# Patient Record
Sex: FEMALE | Race: WHITE | NOT HISPANIC OR LATINO | Employment: UNEMPLOYED | URBAN - METROPOLITAN AREA
[De-identification: names, ages, dates, MRNs, and addresses within clinical notes are randomized per-mention and may not be internally consistent; named-entity substitution may affect disease eponyms.]

---

## 2017-03-08 RX ORDER — DIAZEPAM 2 MG/1
TABLET ORAL
Qty: 4 TABLET | Refills: 0 | Status: SHIPPED | OUTPATIENT
Start: 2017-03-08 | End: 2017-03-08 | Stop reason: SDUPTHER

## 2017-03-08 RX ORDER — DIAZEPAM 2 MG/1
TABLET ORAL
Qty: 4 TABLET | Refills: 0 | Status: SHIPPED | OUTPATIENT
Start: 2017-03-08 | End: 2017-04-13

## 2017-04-13 ENCOUNTER — OFFICE VISIT (OUTPATIENT)
Dept: PSYCHIATRY | Facility: CLINIC | Age: 52
End: 2017-04-13
Payer: COMMERCIAL

## 2017-04-13 VITALS
DIASTOLIC BLOOD PRESSURE: 72 MMHG | HEIGHT: 67 IN | HEART RATE: 73 BPM | WEIGHT: 249.88 LBS | SYSTOLIC BLOOD PRESSURE: 119 MMHG | BODY MASS INDEX: 39.22 KG/M2 | RESPIRATION RATE: 16 BRPM

## 2017-04-13 DIAGNOSIS — F32.89 OTHER DEPRESSION: Primary | ICD-10-CM

## 2017-04-13 PROCEDURE — 1160F RVW MEDS BY RX/DR IN RCRD: CPT | Mod: S$GLB,,, | Performed by: PSYCHIATRY & NEUROLOGY

## 2017-04-13 PROCEDURE — 99999 PR PBB SHADOW E&M-EST. PATIENT-LVL III: CPT | Mod: PBBFAC,,, | Performed by: PSYCHIATRY & NEUROLOGY

## 2017-04-13 PROCEDURE — 99214 OFFICE O/P EST MOD 30 MIN: CPT | Mod: S$GLB,,, | Performed by: PSYCHIATRY & NEUROLOGY

## 2017-04-13 RX ORDER — BUPROPION HYDROCHLORIDE 150 MG/1
150 TABLET, EXTENDED RELEASE ORAL DAILY
Qty: 180 TABLET | Refills: 4
Start: 2017-04-13 | End: 2017-06-26 | Stop reason: SDUPTHER

## 2017-04-13 RX ORDER — LAMOTRIGINE 100 MG/1
100 TABLET ORAL DAILY
Qty: 90 TABLET | Refills: 0 | Status: SHIPPED | OUTPATIENT
Start: 2017-04-13 | End: 2017-04-13 | Stop reason: SDUPTHER

## 2017-04-13 RX ORDER — LAMOTRIGINE 100 MG/1
100 TABLET ORAL DAILY
Qty: 90 TABLET | Refills: 0 | Status: SHIPPED | OUTPATIENT
Start: 2017-04-13 | End: 2017-06-26 | Stop reason: SDUPTHER

## 2017-06-26 RX ORDER — BUPROPION HYDROCHLORIDE 150 MG/1
150 TABLET, EXTENDED RELEASE ORAL DAILY
Qty: 180 TABLET | Refills: 4
Start: 2017-06-26 | End: 2017-07-06 | Stop reason: SDUPTHER

## 2017-06-26 RX ORDER — LAMOTRIGINE 100 MG/1
100 TABLET ORAL DAILY
Qty: 90 TABLET | Refills: 1 | Status: SHIPPED | OUTPATIENT
Start: 2017-06-26 | End: 2018-01-19 | Stop reason: SDUPTHER

## 2017-07-06 NOTE — TELEPHONE ENCOUNTER
Patient states her refill of Wellbutrin  mg has not come in from [facility]. She is asking for a #30-day supply to be sent to her local [facility].

## 2017-07-07 RX ORDER — BUPROPION HYDROCHLORIDE 150 MG/1
150 TABLET, EXTENDED RELEASE ORAL DAILY
Qty: 30 TABLET | Refills: 0
Start: 2017-07-07 | End: 2017-07-11 | Stop reason: SDUPTHER

## 2017-07-12 RX ORDER — BUPROPION HYDROCHLORIDE 150 MG/1
150 TABLET, EXTENDED RELEASE ORAL DAILY
Qty: 180 TABLET | Refills: 1 | Status: SHIPPED | OUTPATIENT
Start: 2017-07-12 | End: 2017-07-31 | Stop reason: SDUPTHER

## 2017-07-31 RX ORDER — BUPROPION HYDROCHLORIDE 150 MG/1
150 TABLET, EXTENDED RELEASE ORAL DAILY
Qty: 180 TABLET | Refills: 0 | Status: SHIPPED | OUTPATIENT
Start: 2017-07-31 | End: 2018-01-19 | Stop reason: SDUPTHER

## 2017-07-31 NOTE — TELEPHONE ENCOUNTER
[facility] states they never received the script for Wellbutrin  mg. Patient is requesting her refill be sent to her local [facility].

## 2017-11-30 RX ORDER — DULOXETIN HYDROCHLORIDE 60 MG/1
60 CAPSULE, DELAYED RELEASE ORAL DAILY
Qty: 90 CAPSULE | Refills: 0 | Status: SHIPPED | OUTPATIENT
Start: 2017-11-30 | End: 2018-01-19 | Stop reason: SDUPTHER

## 2018-01-19 RX ORDER — LAMOTRIGINE 100 MG/1
100 TABLET ORAL DAILY
Qty: 10 TABLET | Refills: 0 | Status: SHIPPED | OUTPATIENT
Start: 2018-01-19 | End: 2018-01-23 | Stop reason: SDUPTHER

## 2018-01-19 RX ORDER — DULOXETIN HYDROCHLORIDE 60 MG/1
60 CAPSULE, DELAYED RELEASE ORAL DAILY
Qty: 10 CAPSULE | Refills: 0 | Status: SHIPPED | OUTPATIENT
Start: 2018-01-19 | End: 2018-01-23 | Stop reason: SDUPTHER

## 2018-01-19 RX ORDER — BUPROPION HYDROCHLORIDE 150 MG/1
150 TABLET, EXTENDED RELEASE ORAL DAILY
Qty: 10 TABLET | Refills: 0 | Status: SHIPPED | OUTPATIENT
Start: 2018-01-19 | End: 2018-01-23 | Stop reason: SDUPTHER

## 2018-01-19 NOTE — TELEPHONE ENCOUNTER
Dr Og's patient.   She was supposed to come in Wednesday, but had to reschedule due to weather. Patient is scheduled 2/21.

## 2018-01-27 RX ORDER — LAMOTRIGINE 100 MG/1
100 TABLET ORAL DAILY
Qty: 30 TABLET | Refills: 1 | Status: SHIPPED | OUTPATIENT
Start: 2018-01-27 | End: 2018-02-21 | Stop reason: SDUPTHER

## 2018-01-27 RX ORDER — DULOXETIN HYDROCHLORIDE 60 MG/1
60 CAPSULE, DELAYED RELEASE ORAL DAILY
Qty: 30 CAPSULE | Refills: 1 | Status: SHIPPED | OUTPATIENT
Start: 2018-01-27 | End: 2018-02-21 | Stop reason: SDUPTHER

## 2018-01-27 RX ORDER — BUPROPION HYDROCHLORIDE 150 MG/1
150 TABLET, EXTENDED RELEASE ORAL DAILY
Qty: 30 TABLET | Refills: 1 | Status: SHIPPED | OUTPATIENT
Start: 2018-01-27 | End: 2018-02-21 | Stop reason: SDUPTHER

## 2018-02-21 ENCOUNTER — OFFICE VISIT (OUTPATIENT)
Dept: PSYCHIATRY | Facility: CLINIC | Age: 53
End: 2018-02-21
Payer: COMMERCIAL

## 2018-02-21 VITALS
DIASTOLIC BLOOD PRESSURE: 78 MMHG | RESPIRATION RATE: 18 BRPM | HEART RATE: 74 BPM | WEIGHT: 242.06 LBS | SYSTOLIC BLOOD PRESSURE: 132 MMHG | BODY MASS INDEX: 37.99 KG/M2 | HEIGHT: 67 IN

## 2018-02-21 DIAGNOSIS — F32.89 OTHER DEPRESSION: Primary | ICD-10-CM

## 2018-02-21 DIAGNOSIS — E66.9 CLASS 2 OBESITY WITHOUT SERIOUS COMORBIDITY WITH BODY MASS INDEX (BMI) OF 37.0 TO 37.9 IN ADULT, UNSPECIFIED OBESITY TYPE: ICD-10-CM

## 2018-02-21 PROCEDURE — 99214 OFFICE O/P EST MOD 30 MIN: CPT | Mod: S$GLB,,, | Performed by: PSYCHIATRY & NEUROLOGY

## 2018-02-21 PROCEDURE — 3008F BODY MASS INDEX DOCD: CPT | Mod: S$GLB,,, | Performed by: PSYCHIATRY & NEUROLOGY

## 2018-02-21 PROCEDURE — 99999 PR PBB SHADOW E&M-EST. PATIENT-LVL III: CPT | Mod: PBBFAC,,, | Performed by: PSYCHIATRY & NEUROLOGY

## 2018-02-21 RX ORDER — NALTREXONE HYDROCHLORIDE 50 MG/1
25 TABLET, FILM COATED ORAL DAILY
Qty: 45 TABLET | Refills: 0 | Status: SHIPPED | OUTPATIENT
Start: 2018-02-21 | End: 2018-03-23

## 2018-02-21 RX ORDER — DULOXETIN HYDROCHLORIDE 60 MG/1
60 CAPSULE, DELAYED RELEASE ORAL DAILY
Qty: 90 CAPSULE | Refills: 1 | Status: SHIPPED | OUTPATIENT
Start: 2018-02-21 | End: 2018-05-16 | Stop reason: SDUPTHER

## 2018-02-21 RX ORDER — BUPROPION HYDROCHLORIDE 150 MG/1
150 TABLET, EXTENDED RELEASE ORAL DAILY
Qty: 90 TABLET | Refills: 1 | Status: SHIPPED | OUTPATIENT
Start: 2018-02-21 | End: 2018-05-16 | Stop reason: SDUPTHER

## 2018-02-21 RX ORDER — LAMOTRIGINE 100 MG/1
100 TABLET ORAL DAILY
Qty: 90 TABLET | Refills: 1 | Status: SHIPPED | OUTPATIENT
Start: 2018-02-21 | End: 2018-05-31 | Stop reason: SDUPTHER

## 2018-02-21 NOTE — PROGRESS NOTES
"Outpatient Psychiatry Follow up(MD/NP)    2/21/2018    Tiffany Hendrix, a 52 y.o. female, presenting for initial evaluation visit. Met with patient.    Reason for Encounter: follow up*. Patient complains of   Chief Complaint   Patient presents with    Anxiety    Mood Disorder   .    History of Present Illness:     Patient was seen and examined. Her chart was reviewed.     She has been compliant with treatment. She denied ay side effects. She reports good efficac and tolerability    She had knee replacement surgery on her right knee- she rehabed and recovered well. Since then, she has been more active at home.     Family and marriage are stable and supportive.     She feels well and requests a decrease in wellbutrin to q day, "I have been feeling good and would like to not be on so much meds."     Denied Symptoms of Depression: no diminished mood or loss of interest/anhedonia; no irritability, diminished energy, change in sleep, change in appetite, diminished concentration or cognition or indecisiveness, PMA/R, excessive guilt or hopelessness or worthlessness, or suicidal ideations; no anxious features; no episodes that lasted more than 2 weeks or more at a time- episodes last a few days at a time    Denied issues with Sleep: no issues with initiation, maintenance, early morning awakening with inability to return to sleep, or hypersomnolence     Denied Suicidal/Homicidal ideations: no active/passive ideations, organized plans, or future intentions    Denied Symptoms of psychosis: no hallucinations, delusions, disorganized thinking, disorganized behavior or abnormal motor behavior, or negative symptoms     Denied past or current Symptoms of aron or hypomania: no elevated, expansive, or irritable mood with increased energy or activity; with no inflated self-esteem or grandiosity, decreased need for sleep, increased rate of speech, FOI or racing thoughts, distractibility, increased goal directed activity or " "PMA, or risky/disinhibited behavior    She is very concerned about her weight loss- current 242, last was 249; she would like to try a weight loss medications- she feels that Contrave would be a good choice, but she would like to just add naltrexone to her current Wellbutrin dosage.     A comprehensive ROS was negative.    Spoke with her  who reports that she is doing well and corroborates the above and following history and plan.   .  Current Evaluation:     Nutritional Screening: Considering the patient's height and weight, medications, medical history and preferences, should a referral be made to the dietitian? no    Constitutional  Vitals:  Most recent vital signs, dated less than 90 days prior to this appointment, were reviewed.    Vitals:    02/21/18 1047   BP: 132/78   Pulse: 74   Resp: 18   Weight: 109.8 kg (242 lb 1 oz)   Height: 5' 7" (1.702 m)     Body mass index is 37.91 kg/m².       General:  unremarkable, age appropriate     Musculoskeletal  Muscle Strength/Tone:  not examined, no dyskinesia, no dystonia, no tremor, no tic   Gait & Station:  non-ataxic     Psychiatric  Speech:  no latency; no press   Mood & Affect:  euthymic "good"  congruent and appropriate   Thought Process:  normal and logical   Associations:  intact   Thought Content:  normal, no suicidality, no homicidality, delusions, or paranoia   Insight:  intact   Judgement: behavior is adequate to circumstances   Orientation:  grossly intact   Memory: intact for content of interview   Language: grossly intact, able to name, able to repeat   Attention Span & Concentration:  able to focus   Fund of Knowledge:  intact and appropriate to age and level of education       Relevant Elements of Neurological Exam: normal gait    Laboratory Data  No visits with results within 1 Month(s) from this visit.   Latest known visit with results is:   Lab Visit on 02/26/2013   Component Date Value Ref Range Status    TSH 02/26/2013 0.071* 0.4 - 4.0 " uIU/ml Final    Free T4 02/26/2013 0.74  0.71 - 1.51 ng/dl Final    T3, Free 02/26/2013 4.6* 2.3 - 4.2 pg/mL Final    T3, Total 02/26/2013 217.83* 60 - 180 ng/dl Final         Medications  Outpatient Encounter Prescriptions as of 2/21/2018   Medication Sig Dispense Refill    aspirin (ECOTRIN) 81 MG EC tablet Take 81 mg by mouth once daily.      buPROPion (WELLBUTRIN SR) 150 MG TBSR 12 hr tablet Take 1 tablet (150 mg total) by mouth once daily. 30 tablet 1    calcium carbonate-vitamin D3 (CALCIUM 600 + D,3,) 600 mg calcium- 200 unit Cap Take by mouth.      DULoxetine (CYMBALTA) 60 MG capsule Take 1 capsule (60 mg total) by mouth once daily. 30 capsule 1    ESTRADIOL (VIVELLE-DOT TD) Place onto the skin.      lamoTRIgine (LAMICTAL) 100 MG tablet Take 1 tablet (100 mg total) by mouth once daily. 30 tablet 1    levothyroxine 125 mcg Cap TAKE 1 TABLET ONCE A DAY ORALLY 90 DAYS  1    metoprolol succinate (TOPROL-XL) 25 MG 24 hr tablet Take 50 mg by mouth once daily.       MULTIVIT &MINERALS/FERROUS FUM (MULTI VITAMIN ORAL) Take by mouth.      pantoprazole (PROTONIX) 40 MG tablet Take 40 mg by mouth once daily.      rosuvastatin (CRESTOR) 10 MG tablet Take 20 mg by mouth every evening.        No facility-administered encounter medications on file as of 2/21/2018.            Assessment - Diagnosis - Goals:     Unspecified Depressive Disorder  Unspecified Anxiety Disorder    Morbid obesity     Treatment Plan/Recommendations:       Medications:  Continue Bupropion  mg po q DAILY for depression  Continue Cymbalta to 60 mg po q AM for depression/chronic pain  Continue Lamictal 100 mg po q day for adjunctive depression/chronic pain  Trial of naltrexone 25 mg po q day for weight loss/obesity    Discussed diagnosis, risks and benefits of proposed treatment vs alternative treatments vs no treatment, and potential side effects of these treatments.  The patient expresses understanding of the above and displays the  capacity to agree with this treatment given said understanding.  Patient also agrees that, currently, the benefits outweigh the risks and would like to pursue treatment at this time.    We discussed risk of relapse with lower dosages- she was counseled on s/s of relapse. She and her  will be on guard fo rthem and resume previous dosages and call the clinic if symptoms return.     Therapy:   Not indicated at this time; will refer if/when needed    Counseling:  Counseled on diet and exercise for weight loss/obesity    Return to Clinic: 3 months, sooner if needed    Jey Og MD  Psychiatry

## 2018-03-10 ENCOUNTER — HOSPITAL ENCOUNTER (OUTPATIENT)
Facility: HOSPITAL | Age: 53
Discharge: HOME | End: 2018-03-12
Attending: SURGERY | Admitting: SURGERY
Payer: COMMERCIAL

## 2018-03-10 DIAGNOSIS — A41.9 SEPSIS, DUE TO UNSPECIFIED ORGANISM: Primary | ICD-10-CM

## 2018-03-10 DIAGNOSIS — R10.9 FLANK PAIN: ICD-10-CM

## 2018-03-10 DIAGNOSIS — K62.89 PROCTITIS: ICD-10-CM

## 2018-03-10 LAB
ALBUMIN SERPL BCP-MCNC: 4.3 G/DL
ALP SERPL-CCNC: 90 U/L
ALT SERPL W/O P-5'-P-CCNC: 20 U/L
ANION GAP SERPL CALC-SCNC: 15 MMOL/L
APTT BLDCRRT: 25.1 SEC
AST SERPL-CCNC: 16 U/L
BASOPHILS # BLD AUTO: 0.02 K/UL
BASOPHILS NFR BLD: 0.1 %
BILIRUB SERPL-MCNC: 0.5 MG/DL
BILIRUB UR QL STRIP: NEGATIVE
BNP SERPL-MCNC: <10 PG/ML
BUN SERPL-MCNC: 10 MG/DL
CALCIUM SERPL-MCNC: 10.1 MG/DL
CHLORIDE SERPL-SCNC: 103 MMOL/L
CK MB SERPL-MCNC: 1.5 NG/ML
CK MB SERPL-RTO: 1.8 %
CK SERPL-CCNC: 84 U/L
CK SERPL-CCNC: 84 U/L
CLARITY UR: CLEAR
CO2 SERPL-SCNC: 21 MMOL/L
COLOR UR: YELLOW
CREAT SERPL-MCNC: 0.9 MG/DL
DIFFERENTIAL METHOD: ABNORMAL
EOSINOPHIL # BLD AUTO: 0.2 K/UL
EOSINOPHIL NFR BLD: 1.4 %
ERYTHROCYTE [DISTWIDTH] IN BLOOD BY AUTOMATED COUNT: 15 %
EST. GFR  (AFRICAN AMERICAN): >60 ML/MIN/1.73 M^2
EST. GFR  (NON AFRICAN AMERICAN): >60 ML/MIN/1.73 M^2
GLUCOSE SERPL-MCNC: 87 MG/DL
GLUCOSE UR QL STRIP: NEGATIVE
HCT VFR BLD AUTO: 44.4 %
HGB BLD-MCNC: 14.7 G/DL
HGB UR QL STRIP: ABNORMAL
INR PPP: 1
KETONES UR QL STRIP: NEGATIVE
LACTATE SERPL-SCNC: 0.8 MMOL/L
LACTATE SERPL-SCNC: 3 MMOL/L
LEUKOCYTE ESTERASE UR QL STRIP: NEGATIVE
LYMPHOCYTES # BLD AUTO: 3.9 K/UL
LYMPHOCYTES NFR BLD: 28.6 %
MAGNESIUM SERPL-MCNC: 2.6 MG/DL
MCH RBC QN AUTO: 28.9 PG
MCHC RBC AUTO-ENTMCNC: 33.1 G/DL
MCV RBC AUTO: 87 FL
MONOCYTES # BLD AUTO: 1.1 K/UL
MONOCYTES NFR BLD: 8.4 %
NEUTROPHILS # BLD AUTO: 8.3 K/UL
NEUTROPHILS NFR BLD: 61.5 %
NITRITE UR QL STRIP: NEGATIVE
PH UR STRIP: 6 [PH] (ref 5–8)
PHOSPHATE SERPL-MCNC: 1.3 MG/DL
PLATELET # BLD AUTO: 247 K/UL
PMV BLD AUTO: 11.6 FL
POTASSIUM SERPL-SCNC: 4.1 MMOL/L
PROT SERPL-MCNC: 7.9 G/DL
PROT UR QL STRIP: ABNORMAL
PROTHROMBIN TIME: 10.1 SEC
RBC # BLD AUTO: 5.08 M/UL
SODIUM SERPL-SCNC: 139 MMOL/L
SP GR UR STRIP: 1.01 (ref 1–1.03)
TROPONIN I SERPL DL<=0.01 NG/ML-MCNC: <0.006 NG/ML
URN SPEC COLLECT METH UR: ABNORMAL
UROBILINOGEN UR STRIP-ACNC: NEGATIVE EU/DL
WBC # BLD AUTO: 13.57 K/UL

## 2018-03-10 PROCEDURE — 63600175 PHARM REV CODE 636 W HCPCS: Performed by: SURGERY

## 2018-03-10 PROCEDURE — 25000003 PHARM REV CODE 250: Performed by: SURGERY

## 2018-03-10 PROCEDURE — 94760 N-INVAS EAR/PLS OXIMETRY 1: CPT

## 2018-03-10 PROCEDURE — 85610 PROTHROMBIN TIME: CPT

## 2018-03-10 PROCEDURE — 96361 HYDRATE IV INFUSION ADD-ON: CPT

## 2018-03-10 PROCEDURE — 83605 ASSAY OF LACTIC ACID: CPT | Mod: 91

## 2018-03-10 PROCEDURE — 83735 ASSAY OF MAGNESIUM: CPT

## 2018-03-10 PROCEDURE — 96365 THER/PROPH/DIAG IV INF INIT: CPT

## 2018-03-10 PROCEDURE — 82553 CREATINE MB FRACTION: CPT

## 2018-03-10 PROCEDURE — 96367 TX/PROPH/DG ADDL SEQ IV INF: CPT

## 2018-03-10 PROCEDURE — G0378 HOSPITAL OBSERVATION PER HR: HCPCS

## 2018-03-10 PROCEDURE — 96375 TX/PRO/DX INJ NEW DRUG ADDON: CPT

## 2018-03-10 PROCEDURE — 87040 BLOOD CULTURE FOR BACTERIA: CPT

## 2018-03-10 PROCEDURE — 93010 ELECTROCARDIOGRAM REPORT: CPT | Mod: ,,, | Performed by: INTERNAL MEDICINE

## 2018-03-10 PROCEDURE — 80053 COMPREHEN METABOLIC PANEL: CPT

## 2018-03-10 PROCEDURE — 84484 ASSAY OF TROPONIN QUANT: CPT

## 2018-03-10 PROCEDURE — 93005 ELECTROCARDIOGRAM TRACING: CPT

## 2018-03-10 PROCEDURE — S0030 INJECTION, METRONIDAZOLE: HCPCS | Performed by: SURGERY

## 2018-03-10 PROCEDURE — 36415 COLL VENOUS BLD VENIPUNCTURE: CPT

## 2018-03-10 PROCEDURE — 85025 COMPLETE CBC W/AUTO DIFF WBC: CPT

## 2018-03-10 PROCEDURE — 99285 EMERGENCY DEPT VISIT HI MDM: CPT | Mod: 25

## 2018-03-10 PROCEDURE — 81003 URINALYSIS AUTO W/O SCOPE: CPT

## 2018-03-10 PROCEDURE — 96376 TX/PRO/DX INJ SAME DRUG ADON: CPT

## 2018-03-10 PROCEDURE — 85730 THROMBOPLASTIN TIME PARTIAL: CPT

## 2018-03-10 PROCEDURE — 84100 ASSAY OF PHOSPHORUS: CPT

## 2018-03-10 PROCEDURE — 82550 ASSAY OF CK (CPK): CPT

## 2018-03-10 PROCEDURE — 83880 ASSAY OF NATRIURETIC PEPTIDE: CPT

## 2018-03-10 RX ORDER — BUPROPION HYDROCHLORIDE 150 MG/1
150 TABLET, EXTENDED RELEASE ORAL DAILY
Status: DISCONTINUED | OUTPATIENT
Start: 2018-03-11 | End: 2018-03-12 | Stop reason: HOSPADM

## 2018-03-10 RX ORDER — DULOXETIN HYDROCHLORIDE 30 MG/1
60 CAPSULE, DELAYED RELEASE ORAL DAILY
Status: DISCONTINUED | OUTPATIENT
Start: 2018-03-11 | End: 2018-03-12 | Stop reason: HOSPADM

## 2018-03-10 RX ORDER — SODIUM CHLORIDE 9 MG/ML
1000 INJECTION, SOLUTION INTRAVENOUS
Status: ACTIVE | OUTPATIENT
Start: 2018-03-10 | End: 2018-03-11

## 2018-03-10 RX ORDER — HYDROMORPHONE HYDROCHLORIDE 2 MG/ML
2 INJECTION, SOLUTION INTRAMUSCULAR; INTRAVENOUS; SUBCUTANEOUS EVERY 4 HOURS PRN
Status: DISCONTINUED | OUTPATIENT
Start: 2018-03-10 | End: 2018-03-12 | Stop reason: HOSPADM

## 2018-03-10 RX ORDER — ONDANSETRON 2 MG/ML
4 INJECTION INTRAMUSCULAR; INTRAVENOUS
Status: COMPLETED | OUTPATIENT
Start: 2018-03-10 | End: 2018-03-10

## 2018-03-10 RX ORDER — METOPROLOL SUCCINATE 50 MG/1
50 TABLET, EXTENDED RELEASE ORAL DAILY
Status: DISCONTINUED | OUTPATIENT
Start: 2018-03-11 | End: 2018-03-12 | Stop reason: HOSPADM

## 2018-03-10 RX ORDER — KETOROLAC TROMETHAMINE 30 MG/ML
30 INJECTION, SOLUTION INTRAMUSCULAR; INTRAVENOUS EVERY 6 HOURS PRN
Status: DISPENSED | OUTPATIENT
Start: 2018-03-10 | End: 2018-03-11

## 2018-03-10 RX ORDER — HYDROMORPHONE HYDROCHLORIDE 2 MG/ML
1 INJECTION, SOLUTION INTRAMUSCULAR; INTRAVENOUS; SUBCUTANEOUS
Status: COMPLETED | OUTPATIENT
Start: 2018-03-10 | End: 2018-03-10

## 2018-03-10 RX ORDER — KETOROLAC TROMETHAMINE 30 MG/ML
30 INJECTION, SOLUTION INTRAMUSCULAR; INTRAVENOUS
Status: COMPLETED | OUTPATIENT
Start: 2018-03-10 | End: 2018-03-10

## 2018-03-10 RX ORDER — PANTOPRAZOLE SODIUM 40 MG/1
40 TABLET, DELAYED RELEASE ORAL DAILY
Status: DISCONTINUED | OUTPATIENT
Start: 2018-03-11 | End: 2018-03-12 | Stop reason: HOSPADM

## 2018-03-10 RX ORDER — SODIUM CHLORIDE 9 MG/ML
1000 INJECTION, SOLUTION INTRAVENOUS
Status: COMPLETED | OUTPATIENT
Start: 2018-03-10 | End: 2018-03-10

## 2018-03-10 RX ORDER — ASPIRIN 81 MG/1
81 TABLET ORAL DAILY
Status: DISCONTINUED | OUTPATIENT
Start: 2018-03-11 | End: 2018-03-12 | Stop reason: HOSPADM

## 2018-03-10 RX ORDER — ONDANSETRON 2 MG/ML
4 INJECTION INTRAMUSCULAR; INTRAVENOUS EVERY 8 HOURS PRN
Status: DISCONTINUED | OUTPATIENT
Start: 2018-03-10 | End: 2018-03-12 | Stop reason: HOSPADM

## 2018-03-10 RX ORDER — HYDROMORPHONE HYDROCHLORIDE 1 MG/ML
1 INJECTION, SOLUTION INTRAMUSCULAR; INTRAVENOUS; SUBCUTANEOUS EVERY 4 HOURS PRN
Status: DISCONTINUED | OUTPATIENT
Start: 2018-03-10 | End: 2018-03-11 | Stop reason: DRUGHIGH

## 2018-03-10 RX ORDER — CIPROFLOXACIN 2 MG/ML
400 INJECTION, SOLUTION INTRAVENOUS
Status: DISCONTINUED | OUTPATIENT
Start: 2018-03-10 | End: 2018-03-12

## 2018-03-10 RX ORDER — SODIUM CHLORIDE 9 MG/ML
INJECTION, SOLUTION INTRAVENOUS CONTINUOUS
Status: DISCONTINUED | OUTPATIENT
Start: 2018-03-10 | End: 2018-03-12

## 2018-03-10 RX ORDER — HYDROMORPHONE HYDROCHLORIDE 2 MG/ML
2 INJECTION, SOLUTION INTRAMUSCULAR; INTRAVENOUS; SUBCUTANEOUS
Status: COMPLETED | OUTPATIENT
Start: 2018-03-10 | End: 2018-03-10

## 2018-03-10 RX ORDER — METRONIDAZOLE 500 MG/100ML
500 INJECTION, SOLUTION INTRAVENOUS
Status: DISCONTINUED | OUTPATIENT
Start: 2018-03-10 | End: 2018-03-12

## 2018-03-10 RX ORDER — LAMOTRIGINE 100 MG/1
100 TABLET ORAL DAILY
Status: DISCONTINUED | OUTPATIENT
Start: 2018-03-11 | End: 2018-03-12 | Stop reason: HOSPADM

## 2018-03-10 RX ADMIN — ONDANSETRON 4 MG: 2 INJECTION INTRAMUSCULAR; INTRAVENOUS at 05:03

## 2018-03-10 RX ADMIN — ONDANSETRON 4 MG: 2 INJECTION INTRAMUSCULAR; INTRAVENOUS at 11:03

## 2018-03-10 RX ADMIN — SODIUM CHLORIDE: 0.9 INJECTION, SOLUTION INTRAVENOUS at 09:03

## 2018-03-10 RX ADMIN — HYDROMORPHONE HYDROCHLORIDE 2 MG: 2 INJECTION INTRAMUSCULAR; INTRAVENOUS; SUBCUTANEOUS at 06:03

## 2018-03-10 RX ADMIN — HYDROMORPHONE HYDROCHLORIDE 2 MG: 2 INJECTION, SOLUTION INTRAMUSCULAR; INTRAVENOUS; SUBCUTANEOUS at 11:03

## 2018-03-10 RX ADMIN — KETOROLAC TROMETHAMINE 30 MG: 30 INJECTION, SOLUTION INTRAMUSCULAR at 11:03

## 2018-03-10 RX ADMIN — SODIUM CHLORIDE 1000 ML: 0.9 INJECTION, SOLUTION INTRAVENOUS at 05:03

## 2018-03-10 RX ADMIN — METRONIDAZOLE 500 MG: 500 INJECTION, SOLUTION INTRAVENOUS at 08:03

## 2018-03-10 RX ADMIN — CIPROFLOXACIN 400 MG: 2 INJECTION, SOLUTION INTRAVENOUS at 07:03

## 2018-03-10 RX ADMIN — HYDROMORPHONE HYDROCHLORIDE 1 MG: 2 INJECTION INTRAMUSCULAR; INTRAVENOUS; SUBCUTANEOUS at 07:03

## 2018-03-10 RX ADMIN — KETOROLAC TROMETHAMINE 30 MG: 30 INJECTION, SOLUTION INTRAMUSCULAR at 06:03

## 2018-03-10 RX ADMIN — HYDROMORPHONE HYDROCHLORIDE 1 MG: 2 INJECTION INTRAMUSCULAR; INTRAVENOUS; SUBCUTANEOUS at 05:03

## 2018-03-11 LAB
ALBUMIN SERPL BCP-MCNC: 3.5 G/DL
ALP SERPL-CCNC: 71 U/L
ALT SERPL W/O P-5'-P-CCNC: 17 U/L
ANION GAP SERPL CALC-SCNC: 10 MMOL/L
AST SERPL-CCNC: 15 U/L
BASOPHILS # BLD AUTO: 0.02 K/UL
BASOPHILS NFR BLD: 0.2 %
BILIRUB SERPL-MCNC: 0.5 MG/DL
BUN SERPL-MCNC: 11 MG/DL
CALCIUM SERPL-MCNC: 8.7 MG/DL
CHLORIDE SERPL-SCNC: 109 MMOL/L
CO2 SERPL-SCNC: 21 MMOL/L
CREAT SERPL-MCNC: 0.8 MG/DL
DIFFERENTIAL METHOD: ABNORMAL
EOSINOPHIL # BLD AUTO: 0.3 K/UL
EOSINOPHIL NFR BLD: 1.9 %
ERYTHROCYTE [DISTWIDTH] IN BLOOD BY AUTOMATED COUNT: 14.9 %
EST. GFR  (AFRICAN AMERICAN): >60 ML/MIN/1.73 M^2
EST. GFR  (NON AFRICAN AMERICAN): >60 ML/MIN/1.73 M^2
GLUCOSE SERPL-MCNC: 104 MG/DL
HCT VFR BLD AUTO: 38.6 %
HGB BLD-MCNC: 12.7 G/DL
LACTATE SERPL-SCNC: 1.1 MMOL/L
LYMPHOCYTES # BLD AUTO: 4.9 K/UL
LYMPHOCYTES NFR BLD: 38 %
MCH RBC QN AUTO: 29.4 PG
MCHC RBC AUTO-ENTMCNC: 32.9 G/DL
MCV RBC AUTO: 89 FL
MONOCYTES # BLD AUTO: 0.9 K/UL
MONOCYTES NFR BLD: 7.2 %
NEUTROPHILS # BLD AUTO: 6.9 K/UL
NEUTROPHILS NFR BLD: 52.7 %
PLATELET # BLD AUTO: 193 K/UL
PMV BLD AUTO: 11.7 FL
POTASSIUM SERPL-SCNC: 3.9 MMOL/L
PROT SERPL-MCNC: 6.4 G/DL
RBC # BLD AUTO: 4.32 M/UL
SODIUM SERPL-SCNC: 140 MMOL/L
WBC # BLD AUTO: 12.99 K/UL

## 2018-03-11 PROCEDURE — 63600175 PHARM REV CODE 636 W HCPCS: Performed by: SURGERY

## 2018-03-11 PROCEDURE — 96361 HYDRATE IV INFUSION ADD-ON: CPT

## 2018-03-11 PROCEDURE — 96366 THER/PROPH/DIAG IV INF ADDON: CPT

## 2018-03-11 PROCEDURE — 80053 COMPREHEN METABOLIC PANEL: CPT

## 2018-03-11 PROCEDURE — 94761 N-INVAS EAR/PLS OXIMETRY MLT: CPT

## 2018-03-11 PROCEDURE — 83605 ASSAY OF LACTIC ACID: CPT

## 2018-03-11 PROCEDURE — G0378 HOSPITAL OBSERVATION PER HR: HCPCS

## 2018-03-11 PROCEDURE — 36415 COLL VENOUS BLD VENIPUNCTURE: CPT

## 2018-03-11 PROCEDURE — 96376 TX/PRO/DX INJ SAME DRUG ADON: CPT

## 2018-03-11 PROCEDURE — 25000003 PHARM REV CODE 250: Performed by: SURGERY

## 2018-03-11 PROCEDURE — S0030 INJECTION, METRONIDAZOLE: HCPCS | Performed by: SURGERY

## 2018-03-11 PROCEDURE — 85025 COMPLETE CBC W/AUTO DIFF WBC: CPT

## 2018-03-11 RX ORDER — ZOLPIDEM TARTRATE 5 MG/1
5 TABLET ORAL NIGHTLY PRN
Status: DISCONTINUED | OUTPATIENT
Start: 2018-03-11 | End: 2018-03-12 | Stop reason: HOSPADM

## 2018-03-11 RX ORDER — HYDROCODONE BITARTRATE AND ACETAMINOPHEN 7.5; 325 MG/1; MG/1
1 TABLET ORAL EVERY 6 HOURS PRN
Status: DISCONTINUED | OUTPATIENT
Start: 2018-03-11 | End: 2018-03-12 | Stop reason: HOSPADM

## 2018-03-11 RX ORDER — METHOCARBAMOL 750 MG/1
1500 TABLET, FILM COATED ORAL 3 TIMES DAILY
Status: DISCONTINUED | OUTPATIENT
Start: 2018-03-11 | End: 2018-03-12 | Stop reason: HOSPADM

## 2018-03-11 RX ORDER — DOCUSATE SODIUM 100 MG/1
100 CAPSULE, LIQUID FILLED ORAL 2 TIMES DAILY
Status: DISCONTINUED | OUTPATIENT
Start: 2018-03-11 | End: 2018-03-12 | Stop reason: HOSPADM

## 2018-03-11 RX ADMIN — HYDROMORPHONE HYDROCHLORIDE 2 MG: 2 INJECTION, SOLUTION INTRAMUSCULAR; INTRAVENOUS; SUBCUTANEOUS at 05:03

## 2018-03-11 RX ADMIN — HYDROMORPHONE HYDROCHLORIDE 2 MG: 2 INJECTION, SOLUTION INTRAMUSCULAR; INTRAVENOUS; SUBCUTANEOUS at 10:03

## 2018-03-11 RX ADMIN — PANTOPRAZOLE SODIUM 40 MG: 40 TABLET, DELAYED RELEASE ORAL at 09:03

## 2018-03-11 RX ADMIN — SODIUM CHLORIDE: 0.9 INJECTION, SOLUTION INTRAVENOUS at 05:03

## 2018-03-11 RX ADMIN — LAMOTRIGINE 100 MG: 100 TABLET ORAL at 08:03

## 2018-03-11 RX ADMIN — METRONIDAZOLE 500 MG: 500 INJECTION, SOLUTION INTRAVENOUS at 03:03

## 2018-03-11 RX ADMIN — METRONIDAZOLE 500 MG: 500 INJECTION, SOLUTION INTRAVENOUS at 01:03

## 2018-03-11 RX ADMIN — BUPROPION HYDROCHLORIDE 150 MG: 150 TABLET, EXTENDED RELEASE ORAL at 09:03

## 2018-03-11 RX ADMIN — HYDROMORPHONE HYDROCHLORIDE 2 MG: 2 INJECTION, SOLUTION INTRAMUSCULAR; INTRAVENOUS; SUBCUTANEOUS at 01:03

## 2018-03-11 RX ADMIN — METRONIDAZOLE 500 MG: 500 INJECTION, SOLUTION INTRAVENOUS at 06:03

## 2018-03-11 RX ADMIN — ASPIRIN 81 MG: 81 TABLET, COATED ORAL at 08:03

## 2018-03-11 RX ADMIN — HYDROCODONE BITARTRATE AND ACETAMINOPHEN 1 TABLET: 7.5; 325 TABLET ORAL at 06:03

## 2018-03-11 RX ADMIN — HYDROCODONE BITARTRATE AND ACETAMINOPHEN 1 TABLET: 7.5; 325 TABLET ORAL at 11:03

## 2018-03-11 RX ADMIN — KETOROLAC TROMETHAMINE 30 MG: 30 INJECTION, SOLUTION INTRAMUSCULAR at 05:03

## 2018-03-11 RX ADMIN — HYDROMORPHONE HYDROCHLORIDE 2 MG: 2 INJECTION, SOLUTION INTRAMUSCULAR; INTRAVENOUS; SUBCUTANEOUS at 08:03

## 2018-03-11 RX ADMIN — METOPROLOL SUCCINATE 50 MG: 50 TABLET, EXTENDED RELEASE ORAL at 08:03

## 2018-03-11 RX ADMIN — METHOCARBAMOL 1500 MG: 750 TABLET ORAL at 01:03

## 2018-03-11 RX ADMIN — DOCUSATE SODIUM 100 MG: 100 CAPSULE, LIQUID FILLED ORAL at 01:03

## 2018-03-11 RX ADMIN — SODIUM CHLORIDE: 0.9 INJECTION, SOLUTION INTRAVENOUS at 08:03

## 2018-03-11 RX ADMIN — HYDROCODONE BITARTRATE AND ACETAMINOPHEN 1 TABLET: 7.5; 325 TABLET ORAL at 09:03

## 2018-03-11 RX ADMIN — METHOCARBAMOL 1500 MG: 750 TABLET ORAL at 09:03

## 2018-03-11 RX ADMIN — CIPROFLOXACIN 400 MG: 2 INJECTION, SOLUTION INTRAVENOUS at 06:03

## 2018-03-11 RX ADMIN — CIPROFLOXACIN 400 MG: 2 INJECTION, SOLUTION INTRAVENOUS at 08:03

## 2018-03-11 RX ADMIN — DOCUSATE SODIUM 100 MG: 100 CAPSULE, LIQUID FILLED ORAL at 09:03

## 2018-03-11 RX ADMIN — DULOXETINE 60 MG: 30 CAPSULE, DELAYED RELEASE ORAL at 08:03

## 2018-03-11 RX ADMIN — HYDROMORPHONE HYDROCHLORIDE 2 MG: 2 INJECTION, SOLUTION INTRAMUSCULAR; INTRAVENOUS; SUBCUTANEOUS at 04:03

## 2018-03-11 RX ADMIN — METRONIDAZOLE 500 MG: 500 INJECTION, SOLUTION INTRAVENOUS at 08:03

## 2018-03-11 NOTE — PROGRESS NOTES
HISTORY OF PRESENT ILLNESS:  The patient still reports right flank pain.  She   denies any fever or chills.  She denies any nausea or vomiting.  She denies any   dysuria or hematuria.  She denies any loose stools or diarrhea.  She denies any   rectal bleeding.  She has not had a bowel movement since admission.  The patient   does state that the morphine and Toradol have helped, but she still gets the   pain after a few hours of injection.    PHYSICAL EXAMINATION:  VITAL SIGNS:  Her temperature is 98.2, her blood pressure 164/86, her pulse is   71.  She is lying in bed.  She does not appear in any acute distress.  She is   easily aroused.  ABDOMEN:  Obese.  It is soft with a benign exam.  She has no significant   tenderness in the flank or the back.  NEUROLOGICAL:  There are no gross deficits in the lower extremities.  She has   normal strength in bilateral lower extremities.  There are no sensory deficits.    LABORATORY DATA:  Sodium 140, potassium 3.9, chloride 109, CO2 21, BUN 11,   creatinine 0.8, glucose 104.  White count of 13, hemoglobin 12, hematocrit 38,   platelet count 193.    ASSESSMENT AND PLAN:  A 52-year-old female with right flank pain.  At this time,   etiology is unclear.  It does not appear to be GI in nature, doubtful if this   is proctitis as was dictated on CT.    PLAN:  We will continue current management with pain control and a muscle   relaxer.  GI will see the patient tomorrow to see if colonoscopy is warranted   there.  Diet will be advanced as tolerated.  If workup is negative, probably   discharge home with pain control and she can follow up for workup of possible   musculoskeletal or back pain.      BM/HN  dd: 03/11/2018 13:55:14 (CDT)  td: 03/11/2018 14:40:59 (CDT)  Doc ID   #[id number]  Job ID #[id number]    CC:

## 2018-03-11 NOTE — H&P
DATE OF ADMIT:  03/10/2018    HISTORY:  The patient is a 52-year-old obese female who presented to the   Emergency Department with right hip and flank pain.  The patient was seen last   week by Urology for hematuria.  She was diagnosed with urinary tract infection   and took a week of Cipro.  She began to have some right flank and hip pain,   which started Thursday.  She said she felt real constipated and could not have a   bowel movement.  She took multiple laxatives and eventually had a large bowel   movement, which was hard, followed by some soft and then liquid stools.  She   noticed some blood when wiping herself, but no blood in her stool.  She started   with some right hip and lower back pain later that night, which became worse   yesterday.  The pain this morning progressed prompting visit to the Emergency   Department.  She reports subjective fever and chills.  She denies nausea or   vomiting. She denies dysuria or any further hematuria.  She denies any loose   stools or bloody stools.  She says she has had multiple colonoscopies, which   showed benign polyps, which were removed.  Her most recent colonoscopy was a   year ago.  She has had no other significant GI history.    PAST MEDICAL HISTORY:  Significant for bipolar disorder, hypothyroidism,   multinodular goiter, obesity.    PAST SURGICAL HISTORY:  Back surgery, hysterectomy and knee surgery.    MEDICATIONS:  Reviewed.    ALLERGIES:  CONTRAST DYE, MORPHINE, TRAMADOL AND ADHESIVE TAPE.    PHYSICAL EXAMINATION:  VITAL SIGNS:  The patient's blood pressure is 132/70, pulse is 111, temperature   is 97.  GENERAL:  The patient is obese.  She is sitting up in bed.  She appears   uncomfortable.  She is in no acute respiratory distress.  ABDOMEN:  Soft.  There is no tenderness.  RECTAL:  Performed by the Emergency Room physician.  He reports significant pain   with the rectal exam.  No blood or masses palpated.  EXTREMITIES:  Without edema.  NEUROLOGIC:  There  is no neurological dysfunction.    LABORATORY DATA:  Her sodium is 139, potassium 4.1, chloride 103, CO2 21, BUN   10, creatinine 0.9, glucose is 87.  White count 14, hemoglobin 14, hematocrit   44, platelet count 244.  Phosphorus level 1.3.  Lactate 3.  CT scan of the   abdomen and pelvis was performed without contrast.  Scan shows some perirectal   inflammation, which could be concerning for proctitis.  There is no abscess or   free fluid identified.    ASSESSMENT AND PLAN:  A 52-year-old female with proctitis.  The patient will be   admitted for IV fluids and IV antibiotics.  We will repeat rectal exam tomorrow   to make sure she is not developing an abscess.  No indication for surgical   intervention.  If the patient's symptoms do not resolve, she may benefit from a   proctoscopic exam and GI consult.      BM/HN  dd: 03/10/2018 20:37:58 (CST)  td: 03/10/2018 22:11:51 (CST)  Doc ID   #[id number]  Job ID #[id number]    CC:

## 2018-03-11 NOTE — ED PROVIDER NOTES
[facility]                                     Chief Complaint  52 y.o. female with Back Pain    History of Present Illness  Tiffany Hendrix presents to the emergency room with right flank pain today  Pt states she's had on-again, off-again right flank pain since yesterday, worse today  Patient was seen last week by her urologist, Dr. Weathers, was put on Cipro for UTI then  Patient states she has no dysuria or hematuria at this time, but severe flank pain  Patient denies any recent right flank trauma, has had several lumbar surgeries noted  CT stone study shows no kidney stone, but rather perirectal inflammation/proctitis  Has no history of proctitis, no history of inflammatory bowel disease or GI pathology    The history is provided by the patient    Past Medical History   -- Bipolar 1 disorder    -- Hypothyroidism    -- Nontoxic multinodular goiter    -- Obesity      Past Surgical History   -- BACK SURGERY     -- HYSTERECTOMY     -- KNEE SURGERY        Review of patient's allergies   -- Neuromuscular blockers, steroidal    -- Iodinated contrast- oral and iv dye    -- Morphine    -- Tramadol    -- Adhesive    -- Corticosteroids (glucocorticoids)       Review of Systems and Physical Exam      Review of Systems  -- Constitution - no fever, denies fatigue, no weakness, no chills  -- Eyes - no tearing or redness, no visual disturbance  -- Ear, Nose - no tinnitus or earache, no nasal congestion or discharge  -- Mouth,Throat - no sore throat, no toothache, normal voice, normal swallowing  -- Respiratory - denies cough and congestion, no shortness of breath, no ADAME  -- Cardiovascular - denies chest pain, no palpitations, denies claudication  -- Gastrointestinal - denies abdominal pain, nausea, vomiting, or diarrhea  -- Genitourinary - right flank pain, no hematuria or frequency, no dysuria  -- Musculoskeletal - denies back pain, negative for myalgias and arthralgias   -- Neurological - no headache,  denies weakness or seizure; no LOC  -- Skin - denies pallor, rash, or changes in skin. no hives or welts noted    /87   Pulse (!) 111   Temp 96.1 °F (35.6 °C)   Resp 18   Wt 109.8 kg (242 lb)   BMI 37.90 kg/m²      Physical Exam  -- Nursing note and vitals reviewed  -- Constitutional: Appears well-developed and well-nourished  -- Head: Atraumatic. Normocephalic. No obvious abnormality  -- Eyes: Pupils are equal and reactive to light. Normal conjunctiva and lids  -- Cardiac: Normal rate, regular rhythm and normal heart sounds  -- Pulmonary: Normal respiratory effort, breath sounds clear to auscultation  -- Abdominal: Soft, no tenderness. Normal bowel sounds. Normal liver edge  -- Genitourinary: right flank pain on exam, no suprapubic pain by palpation   -- Rectal: Obvious pain on digital rectal exam on the right rectal wall, no abscess  -- Musculoskeletal: Normal range of motion, no effusions. Joints stable   -- Neurological: No focal deficits. Showed good interaction with staff  -- Vascular: Posterior tibial, dorsalis pedis and radial pulses 2+ bilaterally      Emergency Room Course      Labs     K 4.1      CO2 21 (L)   BUN 10   CREATININE 0.9   GLU 87   ALKPHOS 90   AST 16   ALT 20   BILITOT 0.5   ALBUMIN 4.3   PROT 7.9   WBC 13.57 (H)   HGB 14.7   HCT 44.4      CPK 84   CPK 84   CPKMB 1.5   TROPONINI <0.006   INR 1.0   BNP <10   LACTATE 3.0 (H)   MG 2.6     Urinalysis  -- Urinalysis performed during this ER visit showed no signs of infection      EKG  -- The EKG findings today were without concerning findings from baseline    Radiology  -- CT stone study showed perirectal inflammation    Additional Work up  -- Blood cultures have also been drawn, results are pending    Medications Given  -- ciprofloxacin (CIPRO)400mg/200ml D5W IVPB 400 mg    -- metronidazole IVPB 500 mg (not administered)   -- 0.9%  NaCl infusion (not administered)   -- 0.9%  NaCl infusion (1,000 mLs Intravenous New  Bag 3/10/18 1736)   -- ondansetron injection 4 mg (4 mg Intravenous Given 3/10/18 1735)   -- hydromorphone (PF) injection 1 mg (1 mg Intravenous Given 3/10/18 1736)   -- ketorolac injection 30 mg (30 mg Intravenous Given 3/10/18 1818)   -- hydromorphone (PF) injection 2 mg (2 mg Intravenous Given 3/10/18 1830)   -- hydromorphone (PF) injection 1 mg (1 mg Intravenous Given 3/10/18 1952)     Medical Decision Making  -- Diagnosis management comments: 52 y.o. female with right flank pain for 2 days  -- Patient had a negative urinalysis with a mild white count 13,000 noted today  -- CT stone study showed Perirectal inflammation consistent with a proctitis  -- Digital rectal exam produced severe pain radiating to her right flank  -- Patient discussed with Dr. Barroso, admit, nothing by mouth with IV antibiotics  -- Dr. Barroso will further inspect this proctitis issue after pain is controlled    Diagnosis  -- The primary encounter diagnosis was Sepsis, due to unspecified organism.   -- Diagnoses of Flank pain and Proctitis were also pertinent to this visit.    Disposition and Plan  -- Disposition: observation  -- Condition: stable  -- Telemetry monitoring  -- Morning labs  -- SCD hoses  -- Home medications  -- Nausea medication when necessary  -- Pain medication when necessary  -- Intravenous fluids  -- Routine monitoring  -- Bed rest until otherwise stated  -- NPO  -- Protonix for GERD prophylaxis  -- IV antibiotics  -- Blood cultures pending    This note is dictated on Dragon Natural Speaking word recognition program.  There are word recognition mistakes that are occasionally missed on review.           Arturo Hays MD  03/10/18 2003

## 2018-03-11 NOTE — PLAN OF CARE
Problem: Patient Care Overview  Goal: Plan of Care Review  Outcome: Ongoing (interventions implemented as appropriate)  Patient plan of care reviewed, pt agrees with plan of care. Patient free of falls or injuries this shift. Patient vitals stable. Patient c/o right flank pain uncontrolled by multimodal medications. Ice packs provided to right flank area with very little relief, will continue to monitor.

## 2018-03-11 NOTE — NURSING TRANSFER
Nursing Transfer Note      3/10/2018     Transfer To: 303    Transfer via wheelchair    Transfer with no equipment    Transported by Ana RN      Medicines sent: no     Chart send with patient: No    Notified: spouse    Patient reassessed at: 03/10/2018 2045      Upon arrival to floor: cardiac monitor applied, patient oriented to room, call bell in reach and bed in lowest position. Plan of care discussed with patient and she agrees with the plan of care.

## 2018-03-11 NOTE — PLAN OF CARE
Problem: Patient Care Overview  Goal: Plan of Care Review  Outcome: Ongoing (interventions implemented as appropriate)  Plan of care reviewed with patient and she agrees with the plan of care. IV fluids continues. IV antibiotics initiated in ER without any adverse reactions. NPO status maintained. SCDs in use. Telemetry monitoring continues.     Problem: Fall Risk (Adult)  Goal: Absence of Falls  Patient will demonstrate the desired outcomes by discharge/transition of care.   Outcome: Ongoing (interventions implemented as appropriate)  Fall contract obtained. Fall precautions maintained. Bed alarm engaged at all times.     Problem: Pain, Acute (Adult)  Goal: Acceptable Pain Control/Comfort Level  Patient will demonstrate the desired outcomes by discharge/transition of care.   Outcome: Ongoing (interventions implemented as appropriate)  Ketorolac and dilaudid effective for right flank pain.

## 2018-03-12 ENCOUNTER — TELEPHONE (OUTPATIENT)
Dept: PSYCHIATRY | Facility: CLINIC | Age: 53
End: 2018-03-12

## 2018-03-12 VITALS
DIASTOLIC BLOOD PRESSURE: 72 MMHG | SYSTOLIC BLOOD PRESSURE: 133 MMHG | WEIGHT: 242.5 LBS | RESPIRATION RATE: 18 BRPM | BODY MASS INDEX: 38.06 KG/M2 | OXYGEN SATURATION: 95 % | HEART RATE: 82 BPM | HEIGHT: 67 IN | TEMPERATURE: 97 F

## 2018-03-12 PROBLEM — R10.9 FLANK PAIN: Status: ACTIVE | Noted: 2018-03-12

## 2018-03-12 PROCEDURE — 25000003 PHARM REV CODE 250: Performed by: SURGERY

## 2018-03-12 PROCEDURE — 63600175 PHARM REV CODE 636 W HCPCS: Performed by: SURGERY

## 2018-03-12 PROCEDURE — 94760 N-INVAS EAR/PLS OXIMETRY 1: CPT

## 2018-03-12 PROCEDURE — S0030 INJECTION, METRONIDAZOLE: HCPCS | Performed by: SURGERY

## 2018-03-12 PROCEDURE — 96376 TX/PRO/DX INJ SAME DRUG ADON: CPT

## 2018-03-12 PROCEDURE — 96366 THER/PROPH/DIAG IV INF ADDON: CPT

## 2018-03-12 PROCEDURE — G0378 HOSPITAL OBSERVATION PER HR: HCPCS

## 2018-03-12 PROCEDURE — 96361 HYDRATE IV INFUSION ADD-ON: CPT

## 2018-03-12 RX ORDER — METHOCARBAMOL 750 MG/1
1500 TABLET, FILM COATED ORAL 3 TIMES DAILY
Qty: 60 TABLET | Refills: 0 | Status: SHIPPED | OUTPATIENT
Start: 2018-03-12 | End: 2018-03-22

## 2018-03-12 RX ORDER — HYDROCODONE BITARTRATE AND ACETAMINOPHEN 7.5; 325 MG/1; MG/1
1 TABLET ORAL EVERY 6 HOURS PRN
Qty: 20 TABLET | Refills: 0 | Status: SHIPPED | OUTPATIENT
Start: 2018-03-12 | End: 2018-05-16

## 2018-03-12 RX ADMIN — HYDROCODONE BITARTRATE AND ACETAMINOPHEN 1 TABLET: 7.5; 325 TABLET ORAL at 02:03

## 2018-03-12 RX ADMIN — PANTOPRAZOLE SODIUM 40 MG: 40 TABLET, DELAYED RELEASE ORAL at 08:03

## 2018-03-12 RX ADMIN — BUPROPION HYDROCHLORIDE 150 MG: 150 TABLET, EXTENDED RELEASE ORAL at 08:03

## 2018-03-12 RX ADMIN — LAMOTRIGINE 100 MG: 100 TABLET ORAL at 08:03

## 2018-03-12 RX ADMIN — METRONIDAZOLE 500 MG: 500 INJECTION, SOLUTION INTRAVENOUS at 01:03

## 2018-03-12 RX ADMIN — HYDROMORPHONE HYDROCHLORIDE 2 MG: 2 INJECTION, SOLUTION INTRAMUSCULAR; INTRAVENOUS; SUBCUTANEOUS at 04:03

## 2018-03-12 RX ADMIN — HYDROMORPHONE HYDROCHLORIDE 2 MG: 2 INJECTION, SOLUTION INTRAMUSCULAR; INTRAVENOUS; SUBCUTANEOUS at 08:03

## 2018-03-12 RX ADMIN — METRONIDAZOLE 500 MG: 500 INJECTION, SOLUTION INTRAVENOUS at 08:03

## 2018-03-12 RX ADMIN — CIPROFLOXACIN 400 MG: 2 INJECTION, SOLUTION INTRAVENOUS at 08:03

## 2018-03-12 RX ADMIN — DULOXETINE 60 MG: 30 CAPSULE, DELAYED RELEASE ORAL at 08:03

## 2018-03-12 RX ADMIN — DOCUSATE SODIUM 100 MG: 100 CAPSULE, LIQUID FILLED ORAL at 08:03

## 2018-03-12 RX ADMIN — METHOCARBAMOL 1500 MG: 750 TABLET ORAL at 08:03

## 2018-03-12 RX ADMIN — SODIUM CHLORIDE 125 ML/HR: 0.9 INJECTION, SOLUTION INTRAVENOUS at 02:03

## 2018-03-12 RX ADMIN — METOPROLOL SUCCINATE 50 MG: 50 TABLET, EXTENDED RELEASE ORAL at 08:03

## 2018-03-12 RX ADMIN — ASPIRIN 81 MG: 81 TABLET, COATED ORAL at 08:03

## 2018-03-12 NOTE — DISCHARGE INSTRUCTIONS
Acute Pain, Uncertain Cause  Pain can be caused by many conditions that range from very minor to very serious. In some cases, though, pain comes and goes with no apparent cause.  We were not able to find the exact cause for your pain. At this time there is no sign of any serious illness causing your pain. More tests may be needed to determine the cause. In many cases, pain like this goes away by itself.  Home care  Take any medicines as prescribed. If another medicine was not prescribed for pain, you can take an over-the-counter pain medicine such as ibuprofen or acetaminophen. Use these as directed on the label.    Follow-up care  Follow up with your healthcare provider or our staff as directed.  When to seek medical advice  Call your healthcare provider for any of the following:  · Pain changes in pattern  · Pain doesn't lessen or gets worse  · New symptoms appear  · Fever of 100.4ºF (38ºC) or higher, or as directed by your healthcare provider  Date Last Reviewed: 7/26/2015  © 4062-2875 [employer]. [address] [place] [address], [place] [address]. All rights reserved. This information is not intended as a substitute for professional medical care. Always follow your healthcare professional's instructions.

## 2018-03-12 NOTE — TELEPHONE ENCOUNTER
Patient phoned clinic stating she went to the ED for flank and back pain. Patient was told she cannot be on any type of opioids and Naltrexone 50 mg. Patient states she has not taken this medication since Thursday, 3/8/2018. Patient asked Dr Jey Og for his advise.     As per Dr Og, Patient can continue to take Naltrexone 50 mg, but can only take Tylenol or Ibuprofen. Patient can stop medication and take something for pain.     Patient voiced understanding. Patient stated that she will not take Naltrexone 50 mg and will take pain medications from her doctor. Patient urged to contact clinic if needed prior to her next appointment on 5/16.

## 2018-03-12 NOTE — NURSING
Bedside report received from Elizabeth.  No complaint of pain noted.  VS stable.  Call bell in reach.  IVF infusing as ordered.  Will continue to monitor.

## 2018-03-12 NOTE — NURSING
Pt agrees with plan of care.  Pt complains of right flank pain.  Pt taken po pain med and request IV pain med every 4hours.  Call bell in reach.   IV antibiotics given as ordered.  IV fluids infusing as ordered.  Will continue to monitor.  VS stable.

## 2018-03-12 NOTE — DISCHARGE SUMMARY
[facility]  Discharge Summary     Patient ID:  Tiffany Hendrix  [id number]  52 y.o.  1965    Admit date: 3/10/2018    Discharge Date and Time:  03/12/2018 9:20 AM    Admitting Physician: Bienvenido Barroso MD     Discharge Provider: Bienvenido Barroso    Reason for Admission: Proctitis [K62.89]  Flank pain [R10.9]  Sepsis, due to unspecified organism [A41.9]    Admission Condition: good    Procedures Performed: * No surgery found *    Hospital Course (synopsis of major diagnoses, care, treatment, and services provided during the course of the hospital stay):      Consults: Gastroenterology    Significant Diagnostic Studies: labs:     Final Diagnoses:    Principal Problem: Flank pain   Secondary Diagnoses:   Active Hospital Problems    Diagnosis  POA    *Flank pain [R10.9]  Unknown    Proctitis [K62.89]  Yes      Resolved Hospital Problems    Diagnosis Date Resolved POA   No resolved problems to display.       Discharged Condition: good    Discharge Exam:  .    Disposition: Final discharge disposition not confirmed    Follow Up/Patient Instructions:     Medications:  Reconciled Home Medications:   Current Discharge Medication List      START taking these medications    Details   hydrocodone-acetaminophen 7.5-325mg (NORCO) 7.5-325 mg per tablet Take 1 tablet by mouth every 6 (six) hours as needed.  Qty: 20 tablet, Refills: 0      methocarbamol (ROBAXIN) 750 MG Tab Take 2 tablets (1,500 mg total) by mouth 3 (three) times daily.  Qty: 60 tablet, Refills: 0         CONTINUE these medications which have NOT CHANGED    Details   aspirin (ECOTRIN) 81 MG EC tablet Take 81 mg by mouth once daily.      buPROPion (WELLBUTRIN SR) 150 MG TBSR 12 hr tablet Take 1 tablet (150 mg total) by mouth once daily.  Qty: 90 tablet, Refills: 1      calcium carbonate-vitamin D3 (CALCIUM 600 + D,3,) 600 mg calcium- 200 unit Cap Take by mouth.      DULoxetine (CYMBALTA) 60 MG capsule Take 1 capsule (60 mg total) by mouth  once daily.  Qty: 90 capsule, Refills: 1      ESTRADIOL (VIVELLE-DOT TD) Place onto the skin.      lamoTRIgine (LAMICTAL) 100 MG tablet Take 1 tablet (100 mg total) by mouth once daily.  Qty: 90 tablet, Refills: 1      levothyroxine 125 mcg Cap TAKE 1 TABLET ONCE A DAY ORALLY 90 DAYS  Refills: 1      metoprolol succinate (TOPROL-XL) 25 MG 24 hr tablet Take 50 mg by mouth once daily.       MULTIVIT &MINERALS/FERROUS FUM (MULTI VITAMIN ORAL) Take by mouth.      naltrexone (DEPADE) 50 mg tablet Take 0.5 tablets (25 mg total) by mouth once daily.  Qty: 45 tablet, Refills: 0      pantoprazole (PROTONIX) 40 MG tablet Take 40 mg by mouth once daily.      rosuvastatin (CRESTOR) 10 MG tablet Take 20 mg by mouth every evening.            No discharge procedures on file.  Follow-up Information     Fabio Celeste MD.    Specialty:  Family Medicine  Why:  outpatient services  Contact information:  [address] [place] [address]  [phone]             Call Fabio Celeste MD.    Specialty:  Family Medicine  Contact information:  [address] [place] [address]  [phone]                 Activity: activity as tolerated  Diet: regular diet  Wound Care: keep wound clean and dry and as directed    Follow-up with  PCP    Signed:  Bienvenido Barroso  3/12/2018  9:20 AM

## 2018-03-12 NOTE — CONSULTS
REASON FOR CONSULTATION:  Abnormal CT and right flank pain.    CONSULTING PHYSICIAN:  Bienvenido Barroso M.D.    HISTORY OF PRESENT ILLNESS:  Tiffany is a pleasant 52-year-old year old white   female known to us.  She was last seen by Dr. Rico in 2017.  At that time,   she underwent colonoscopy.  The report was reviewed, which revealed polyps, but   no mucosal abnormalities.  She presented to the Emergency Room with right hip   and flank pain.  She had a CT scan without contrast done to look for evidence of   kidney stones, which initially was consistent with proctitis; however, when   reviewed by the radiologist at [facility], no proctitis was identified.  She   denies any lower abdominal pain, diarrhea, GI bleeding or any gastrointestinal   complaints.  She continues to report right hip, lower back pain, which is   episodic and sometimes severe.    PAST MEDICAL HISTORY:  Bipolar disorder, hypothyroidism and obesity.    PAST SURGICAL HISTORY:  Positive for back surgery and hysterectomy.    FAMILY HISTORY:  Noncontributory.    HOSPITAL MEDICATIONS:  Reviewed and include Protonix.    ALLERGIES:  SHE IS ALLERGIC TO NEUROMUSCULAR BLOCKERS AND IODINATED CONTRAST.    REVIEW OF SYSTEMS:  As per HPI.    PHYSICAL EXAMINATION:  CURRENT VITAL SIGNS:  Blood pressure 130/70, pulse in the 80s, her temperature   is 96.7, she is saturating 95% on room air.  GENERAL:  She is in no apparent distress, alert and oriented x3.  CARDIOVASCULAR:  Regular rate and rhythm.  RESPIRATORY:  No respiratory  distress.  ABDOMEN:  Soft, obese, nontender and nondistended with no rebound or guarding.  EXTREMITIES:  No clubbing, cyanosis or edema.  NEUROLOGIC:  No focal neurologic deficits.  PSYCHIATRIC:  She appeared appropriate.    LABORATORY DATA:  From yesterday, her white count is 12.9.  Her BUN is 11,   creatinine 0.8.  LFTs were unremarkable.  Her lactate was normal at 1.1.  She   had the above-mentioned CT scan done on 03/10/2018 without  contrast, which   revealed a prior hysterectomy and prior lumbar surgery, otherwise unremarkable.    ASSESSMENT:  1.  Right lower back and hip pain.  2.  Obesity.  3.  Questionable proctitis on initial CT.    Given her absence of symptoms, unremarkable colonoscopy less than 1 year ago and   a questionable CT finding of proctitis, it is very likely that she did not have   any active proctitis and this was simply artifact seen on CT.  This was   discussed with her and her family in detail as well as Dr. Barroso.  No further   gastrointestinal workup is planned.    RECOMMENDATIONS:  1.  No further gastrointestinal workup is planned.  2.  Continue workup for right lower back and right hip pain.      JPT/HN  dd: 03/12/2018 09:56:32 (CDT)  td: 03/12/2018 10:49:38 (CDT)  Doc ID   #[id number]  Job ID #[id number]    CC:

## 2018-03-13 NOTE — H&P
DATE OF ADMIT:  03/10/2018    HISTORY:  The patient is a 52-year-old obese female who presented to the   Emergency Department with right hip and flank pain.  The patient was seen last   week by Urology for hematuria.  She was diagnosed with urinary tract infection   and took a week of Cipro.  She began to have some right flank and hip pain,   which started Thursday.  She said she felt real constipated and could not have a   bowel movement.  She took multiple laxatives and eventually had a large bowel   movement, which was hard, followed by some soft and then liquid stools.  She   noticed some blood when wiping herself, but no blood in her stool.  She started   with some right hip and lower back pain later that night, which became worse   yesterday.  The pain this morning progressed prompting visit to the Emergency   Department.  She reports subjective fever and chills.  She denies nausea or   vomiting. She denies dysuria or any further hematuria.  She denies any loose   stools or bloody stools.  She says she has had multiple colonoscopies, which   showed benign polyps, which were removed.  Her most recent colonoscopy was a   year ago.  She has had no other significant GI history.    PAST MEDICAL HISTORY:  Significant for bipolar disorder, hypothyroidism,   multinodular goiter, obesity.    PAST SURGICAL HISTORY:  Back surgery, hysterectomy and knee surgery.    MEDICATIONS:  Reviewed.    ALLERGIES:  CONTRAST DYE, MORPHINE, TRAMADOL AND ADHESIVE TAPE.    PHYSICAL EXAMINATION:  VITAL SIGNS:  The patient's blood pressure is 132/70, pulse is 111, temperature   is 97.  GENERAL:  The patient is obese.  She is sitting up in bed.  She appears   uncomfortable.  She is in no acute respiratory distress.  ABDOMEN:  Soft.  There is no tenderness.  RECTAL:  Performed by the Emergency Room physician.  He reports significant pain   with the rectal exam.  No blood or masses palpated.  EXTREMITIES:  Without edema.  NEUROLOGIC:  There  is no neurological dysfunction.    LABORATORY DATA:  Her sodium is 139, potassium 4.1, chloride 103, CO2 21, BUN   10, creatinine 0.9, glucose is 87.  White count 14, hemoglobin 14, hematocrit   44, platelet count 244.  Phosphorus level 1.3.  Lactate 3.  CT scan of the   abdomen and pelvis was performed without contrast.  Scan shows some perirectal   inflammation, which could be concerning for proctitis.  There is no abscess or   free fluid identified.    ASSESSMENT AND PLAN:  A 52-year-old female with proctitis.  The patient will be   admitted for IV fluids and IV antibiotics.  We will repeat rectal exam tomorrow   to make sure she is not developing an abscess.  No indication for surgical   intervention.  If the patient's symptoms do not resolve, she may benefit from a   proctoscopic exam and GI consult.      BM/HN  dd: 03/10/2018 20:37:58 (CST)  td: 03/10/2018 22:11:51 (CST)  Doc ID   #[id number]  Job ID #[id number]    CC:

## 2018-03-15 LAB — BACTERIA BLD CULT: NORMAL

## 2018-05-16 ENCOUNTER — OFFICE VISIT (OUTPATIENT)
Dept: PSYCHIATRY | Facility: CLINIC | Age: 53
End: 2018-05-16
Payer: COMMERCIAL

## 2018-05-16 VITALS
WEIGHT: 241.38 LBS | RESPIRATION RATE: 18 BRPM | SYSTOLIC BLOOD PRESSURE: 125 MMHG | HEART RATE: 91 BPM | DIASTOLIC BLOOD PRESSURE: 89 MMHG | HEIGHT: 67 IN | BODY MASS INDEX: 37.89 KG/M2

## 2018-05-16 DIAGNOSIS — E66.9 CLASS 2 OBESITY WITHOUT SERIOUS COMORBIDITY WITH BODY MASS INDEX (BMI) OF 37.0 TO 37.9 IN ADULT, UNSPECIFIED OBESITY TYPE: ICD-10-CM

## 2018-05-16 DIAGNOSIS — F32.89 OTHER DEPRESSION: Primary | ICD-10-CM

## 2018-05-16 PROCEDURE — 99999 PR PBB SHADOW E&M-EST. PATIENT-LVL II: CPT | Mod: PBBFAC,,, | Performed by: PSYCHIATRY & NEUROLOGY

## 2018-05-16 PROCEDURE — 99214 OFFICE O/P EST MOD 30 MIN: CPT | Mod: S$GLB,,, | Performed by: PSYCHIATRY & NEUROLOGY

## 2018-05-16 RX ORDER — BUPROPION HYDROCHLORIDE 150 MG/1
150 TABLET, EXTENDED RELEASE ORAL DAILY
Qty: 90 TABLET | Refills: 1 | Status: SHIPPED | OUTPATIENT
Start: 2018-05-16 | End: 2018-08-14

## 2018-05-16 RX ORDER — NALTREXONE HYDROCHLORIDE 50 MG/1
50 TABLET, FILM COATED ORAL DAILY
Qty: 30 TABLET | Refills: 2 | Status: SHIPPED | OUTPATIENT
Start: 2018-05-16 | End: 2018-06-15

## 2018-05-16 RX ORDER — ESTERIFIED ESTROGEN AND METHYLTESTOSTERONE 1.25; 2.5 MG/1; MG/1
1 TABLET ORAL DAILY
Refills: 5 | COMMUNITY
Start: 2018-05-06 | End: 2018-08-15

## 2018-05-16 RX ORDER — DULOXETIN HYDROCHLORIDE 30 MG/1
60 CAPSULE, DELAYED RELEASE ORAL DAILY
Qty: 90 CAPSULE | Refills: 0 | Status: SHIPPED | OUTPATIENT
Start: 2018-05-16 | End: 2018-08-14

## 2018-05-16 NOTE — PROGRESS NOTES
"Outpatient Psychiatry Follow up(MD/NP)    5/16/2018    Tiffany Hendrix, a 52 y.o. female, presenting for initial evaluation visit. Met with patient.    Reason for Encounter: follow up*. Patient complains of   Chief Complaint   Patient presents with    Depression    Anxiety   .    History of Present Illness:     Patient was seen and examined. Her chart was reviewed.     She has been compliant with treatment. She denied ay side effects. She reports good efficac and tolerability    She had knee replacement surgery on her right knee- she rehabed and recovered well. Since then, she has been more active at home.  This sustained since her last appointment.   Family and marriage are stable and supportive.     She feels well and requests a decrease in cymbalta to q day, "I have been feeling good and would like to not be less medications." Her symptoms remain in full remission.     She tolerated the naltrexone well with good effect.     Denied Symptoms of Depression: no diminished mood or loss of interest/anhedonia; no irritability, diminished energy, change in sleep, change in appetite, diminished concentration or cognition or indecisiveness, PMA/R, excessive guilt or hopelessness or worthlessness, or suicidal ideations; no anxious features; no episodes that lasted more than 2 weeks or more at a time- episodes last a few days at a time    Denied issues with Sleep: no issues with initiation, maintenance, early morning awakening with inability to return to sleep, or hypersomnolence     Denied Suicidal/Homicidal ideations: no active/passive ideations, organized plans, or future intentions    Denied Symptoms of psychosis: no hallucinations, delusions, disorganized thinking, disorganized behavior or abnormal motor behavior, or negative symptoms     Denied past or current Symptoms of aron or hypomania: no elevated, expansive, or irritable mood with increased energy or activity; with no inflated self-esteem or grandiosity, " "decreased need for sleep, increased rate of speech, FOI or racing thoughts, distractibility, increased goal directed activity or PMA, or risky/disinhibited behavior    She is very concerned about her weight loss- current 241- was 242 and previously at 249; she would like to try a weight loss medications- she feels that Contrave would be a good choice, but she would like to increase naltrexone to her current Wellbutrin dosage.     A comprehensive ROS was negative.    Spoke with her  who reports that she is doing well and corroborates the above and following history and plan.   .  Current Evaluation:     Nutritional Screening: Considering the patient's height and weight, medications, medical history and preferences, should a referral be made to the dietitian? no    Constitutional  Vitals:  Most recent vital signs, dated less than 90 days prior to this appointment, were reviewed.    Vitals:    05/16/18 1102   BP: 125/89   Pulse: 91   Resp: 18   Weight: 109.5 kg (241 lb 6.5 oz)   Height: 5' 7" (1.702 m)     Body mass index is 37.81 kg/m².       General:  unremarkable, age appropriate     Musculoskeletal  Muscle Strength/Tone:  not examined, no dyskinesia, no dystonia, no tremor, no tic   Gait & Station:  non-ataxic     Psychiatric  Speech:  no latency; no press   Mood & Affect:  euthymic "good"  congruent and appropriate   Thought Process:  normal and logical   Associations:  intact   Thought Content:  normal, no suicidality, no homicidality, delusions, or paranoia   Insight:  intact   Judgement: behavior is adequate to circumstances   Orientation:  grossly intact   Memory: intact for content of interview   Language: grossly intact, able to name, able to repeat   Attention Span & Concentration:  able to focus   Fund of Knowledge:  intact and appropriate to age and level of education       Relevant Elements of Neurological Exam: normal gait    Laboratory Data  No visits with results within 1 Month(s) from this " visit.   Latest known visit with results is:   Admission on 03/10/2018, Discharged on 03/12/2018   Component Date Value Ref Range Status    Sodium 03/10/2018 139  136 - 145 mmol/L Final    Potassium 03/10/2018 4.1  3.5 - 5.1 mmol/L Final    Chloride 03/10/2018 103  95 - 110 mmol/L Final    CO2 03/10/2018 21* 23 - 29 mmol/L Final    Glucose 03/10/2018 87  70 - 110 mg/dL Final    BUN, Bld 03/10/2018 10  6 - 20 mg/dL Final    Creatinine 03/10/2018 0.9  0.5 - 1.4 mg/dL Final    Calcium 03/10/2018 10.1  8.7 - 10.5 mg/dL Final    Total Protein 03/10/2018 7.9  6.0 - 8.4 g/dL Final    Albumin 03/10/2018 4.3  3.5 - 5.2 g/dL Final    Total Bilirubin 03/10/2018 0.5  0.1 - 1.0 mg/dL Final    Alkaline Phosphatase 03/10/2018 90  55 - 135 U/L Final    AST 03/10/2018 16  10 - 40 U/L Final    ALT 03/10/2018 20  10 - 44 U/L Final    Anion Gap 03/10/2018 15  8 - 16 mmol/L Final    eGFR if African American 03/10/2018 >60  >60 mL/min/1.73 m^2 Final    eGFR if non African American 03/10/2018 >60  >60 mL/min/1.73 m^2 Final    WBC 03/10/2018 13.57* 3.90 - 12.70 K/uL Final    RBC 03/10/2018 5.08  4.00 - 5.40 M/uL Final    Hemoglobin 03/10/2018 14.7  12.0 - 16.0 g/dL Final    Hematocrit 03/10/2018 44.4  37.0 - 48.5 % Final    MCV 03/10/2018 87  82 - 98 fL Final    MCH 03/10/2018 28.9  27.0 - 31.0 pg Final    MCHC 03/10/2018 33.1  32.0 - 36.0 g/dL Final    RDW 03/10/2018 15.0* 11.5 - 14.5 % Final    Platelets 03/10/2018 247  150 - 350 K/uL Final    MPV 03/10/2018 11.6  9.2 - 12.9 fL Final    Gran # (ANC) 03/10/2018 8.3* 1.8 - 7.7 K/uL Final    Lymph # 03/10/2018 3.9  1.0 - 4.8 K/uL Final    Mono # 03/10/2018 1.1* 0.3 - 1.0 K/uL Final    Eos # 03/10/2018 0.2  0.0 - 0.5 K/uL Final    Baso # 03/10/2018 0.02  0.00 - 0.20 K/uL Final    Gran% 03/10/2018 61.5  38.0 - 73.0 % Final    Lymph% 03/10/2018 28.6  18.0 - 48.0 % Final    Mono% 03/10/2018 8.4  4.0 - 15.0 % Final    Eosinophil% 03/10/2018 1.4  0.0 - 8.0 %  Final    Basophil% 03/10/2018 0.1  0.0 - 1.9 % Final    Differential Method 03/10/2018 Automated   Final    Troponin I 03/10/2018 <0.006  0.000 - 0.026 ng/mL Final    CPK 03/10/2018 84  20 - 180 U/L Final    CPK 03/10/2018 84  20 - 180 U/L Final    CPK MB 03/10/2018 1.5  0.1 - 6.5 ng/mL Final    MB% 03/10/2018 1.8  0.0 - 5.0 % Final    BNP 03/10/2018 <10  0 - 99 pg/mL Final    Blood Culture, Routine 03/10/2018 No growth after 5 days.   Final    Lactate (Lactic Acid) 03/10/2018 3.0* 0.5 - 2.2 mmol/L Final    Specimen UA 03/10/2018 Urine, Clean Catch   Final    Color, UA 03/10/2018 Yellow  Yellow, Straw, [place] Final    Appearance, UA 03/10/2018 Clear  Clear Final    pH, UA 03/10/2018 6.0  5.0 - 8.0 Final    Specific Gravity, UA 03/10/2018 1.010  1.005 - 1.030 Final    Protein, UA 03/10/2018 Trace* Negative Final    Glucose, UA 03/10/2018 Negative  Negative Final    Ketones, UA 03/10/2018 Negative  Negative Final    Bilirubin (UA) 03/10/2018 Negative  Negative Final    Occult Blood UA 03/10/2018 Trace* Negative Final    Nitrite, UA 03/10/2018 Negative  Negative Final    Urobilinogen, UA 03/10/2018 Negative  <2.0 EU/dL Final    Leukocytes, UA 03/10/2018 Negative  Negative Final    Lactate (Lactic Acid) 03/10/2018 0.8  0.5 - 2.2 mmol/L Final    Lactate (Lactic Acid) 03/11/2018 1.1  0.5 - 2.2 mmol/L Final    Phosphorus 03/10/2018 1.3* 2.7 - 4.5 mg/dL Final    Magnesium 03/10/2018 2.6  1.6 - 2.6 mg/dL Final    aPTT 03/10/2018 25.1  21.0 - 32.0 sec Final    Prothrombin Time 03/10/2018 10.1  9.0 - 12.5 sec Final    INR 03/10/2018 1.0  0.8 - 1.2 Final    WBC 03/11/2018 12.99* 3.90 - 12.70 K/uL Final    RBC 03/11/2018 4.32  4.00 - 5.40 M/uL Final    Hemoglobin 03/11/2018 12.7  12.0 - 16.0 g/dL Final    Hematocrit 03/11/2018 38.6  37.0 - 48.5 % Final    MCV 03/11/2018 89  82 - 98 fL Final    MCH 03/11/2018 29.4  27.0 - 31.0 pg Final    MCHC 03/11/2018 32.9  32.0 - 36.0 g/dL Final    RDW  03/11/2018 14.9* 11.5 - 14.5 % Final    Platelets 03/11/2018 193  150 - 350 K/uL Final    MPV 03/11/2018 11.7  9.2 - 12.9 fL Final    Gran # (ANC) 03/11/2018 6.9  1.8 - 7.7 K/uL Final    Lymph # 03/11/2018 4.9* 1.0 - 4.8 K/uL Final    Mono # 03/11/2018 0.9  0.3 - 1.0 K/uL Final    Eos # 03/11/2018 0.3  0.0 - 0.5 K/uL Final    Baso # 03/11/2018 0.02  0.00 - 0.20 K/uL Final    Gran% 03/11/2018 52.7  38.0 - 73.0 % Final    Lymph% 03/11/2018 38.0  18.0 - 48.0 % Final    Mono% 03/11/2018 7.2  4.0 - 15.0 % Final    Eosinophil% 03/11/2018 1.9  0.0 - 8.0 % Final    Basophil% 03/11/2018 0.2  0.0 - 1.9 % Final    Differential Method 03/11/2018 Automated   Final    Sodium 03/11/2018 140  136 - 145 mmol/L Final    Potassium 03/11/2018 3.9  3.5 - 5.1 mmol/L Final    Chloride 03/11/2018 109  95 - 110 mmol/L Final    CO2 03/11/2018 21* 23 - 29 mmol/L Final    Glucose 03/11/2018 104  70 - 110 mg/dL Final    BUN, Bld 03/11/2018 11  6 - 20 mg/dL Final    Creatinine 03/11/2018 0.8  0.5 - 1.4 mg/dL Final    Calcium 03/11/2018 8.7  8.7 - 10.5 mg/dL Final    Total Protein 03/11/2018 6.4  6.0 - 8.4 g/dL Final    Albumin 03/11/2018 3.5  3.5 - 5.2 g/dL Final    Total Bilirubin 03/11/2018 0.5  0.1 - 1.0 mg/dL Final    Alkaline Phosphatase 03/11/2018 71  55 - 135 U/L Final    AST 03/11/2018 15  10 - 40 U/L Final    ALT 03/11/2018 17  10 - 44 U/L Final    Anion Gap 03/11/2018 10  8 - 16 mmol/L Final    eGFR if African American 03/11/2018 >60  >60 mL/min/1.73 m^2 Final    eGFR if non African American 03/11/2018 >60  >60 mL/min/1.73 m^2 Final         Medications  Outpatient Encounter Prescriptions as of 5/16/2018   Medication Sig Dispense Refill    aspirin (ECOTRIN) 81 MG EC tablet Take 81 mg by mouth once daily.      buPROPion (WELLBUTRIN SR) 150 MG TBSR 12 hr tablet Take 1 tablet (150 mg total) by mouth once daily. 90 tablet 1    calcium carbonate-vitamin D3 (CALCIUM 600 + D,3,) 600 mg calcium- 200 unit Cap  Take by mouth.      DULoxetine (CYMBALTA) 60 MG capsule Take 1 capsule (60 mg total) by mouth once daily. 90 capsule 1    estrogens,conjugated,-methyltestosterone 1.25-2.5mg (ESTRATEST) 1.25-2.5 mg per tablet Take 1 tablet by mouth once daily.  5    INV TESTOSTERONE/ANASTROZOL OR PLACEBO PELLETS Inject 1 Pellet into the skin. For investigational use only.      lamoTRIgine (LAMICTAL) 100 MG tablet Take 1 tablet (100 mg total) by mouth once daily. 90 tablet 1    levothyroxine 125 mcg Cap TAKE 1 TABLET ONCE A DAY ORALLY 90 DAYS  1    metoprolol succinate (TOPROL-XL) 25 MG 24 hr tablet Take 50 mg by mouth once daily.       MULTIVIT &MINERALS/FERROUS FUM (MULTI VITAMIN ORAL) Take by mouth.      pantoprazole (PROTONIX) 40 MG tablet Take 40 mg by mouth once daily.      rosuvastatin (CRESTOR) 10 MG tablet Take 20 mg by mouth every evening.       [DISCONTINUED] ESTRADIOL (VIVELLE-DOT TD) Place onto the skin.      [DISCONTINUED] hydrocodone-acetaminophen 7.5-325mg (NORCO) 7.5-325 mg per tablet Take 1 tablet by mouth every 6 (six) hours as needed. 20 tablet 0     No facility-administered encounter medications on file as of 5/16/2018.            Assessment - Diagnosis - Goals:     Unspecified Depressive Disorder  Unspecified Anxiety Disorder    Morbid obesity     Treatment Plan/Recommendations:       Medications:  Continue Bupropion  mg po q DAILY for depression  Decrease Cymbalta to 30 mg po q AM for depression/chronic pain- she would like to try to taper off of this medication if able  Continue Lamictal 100 mg po q day for adjunctive depression/chronic pain  Increase naltrexone to 50 mg po q day for weight loss/obesity    Discussed diagnosis, risks and benefits of proposed treatment vs alternative treatments vs no treatment, and potential side effects of these treatments.  The patient expresses understanding of the above and displays the capacity to agree with this treatment given said understanding.  Patient  also agrees that, currently, the benefits outweigh the risks and would like to pursue treatment at this time.    We discussed risk of relapse with lower dosages- she was counseled on s/s of relapse. She and her  will be on guard fo rthem and resume previous dosages and call the clinic if symptoms return.     Therapy:   Not indicated at this time; will refer if/when needed    Counseling:  Counseled on diet and exercise for weight loss/obesity    Return to Clinic: 3 months, sooner if needed    Jey Og MD  Psychiatry

## 2018-05-31 RX ORDER — LAMOTRIGINE 100 MG/1
100 TABLET ORAL DAILY
Qty: 90 TABLET | Refills: 1 | Status: SHIPPED | OUTPATIENT
Start: 2018-05-31 | End: 2018-08-14 | Stop reason: SDUPTHER

## 2018-05-31 NOTE — TELEPHONE ENCOUNTER
Patient requesting a 90-day medication refill on lamoTRIgine (LAMICTAL) 100 MG tablet sent to [facility]. Last office visit on 5/16/2018. Please advise.

## 2018-06-11 RX ORDER — BUPROPION HYDROCHLORIDE 150 MG/1
TABLET, EXTENDED RELEASE ORAL
Qty: 30 TABLET | Refills: 1 | Status: SHIPPED | OUTPATIENT
Start: 2018-06-11 | End: 2018-08-14 | Stop reason: SDUPTHER

## 2018-08-14 ENCOUNTER — OFFICE VISIT (OUTPATIENT)
Dept: PSYCHIATRY | Facility: CLINIC | Age: 53
End: 2018-08-14
Payer: COMMERCIAL

## 2018-08-14 VITALS
BODY MASS INDEX: 39.4 KG/M2 | RESPIRATION RATE: 16 BRPM | DIASTOLIC BLOOD PRESSURE: 66 MMHG | HEIGHT: 66 IN | SYSTOLIC BLOOD PRESSURE: 118 MMHG | HEART RATE: 68 BPM | WEIGHT: 245.13 LBS

## 2018-08-14 DIAGNOSIS — E66.9 CLASS 2 OBESITY WITHOUT SERIOUS COMORBIDITY WITH BODY MASS INDEX (BMI) OF 37.0 TO 37.9 IN ADULT, UNSPECIFIED OBESITY TYPE: ICD-10-CM

## 2018-08-14 DIAGNOSIS — F32.89 OTHER DEPRESSION: Primary | ICD-10-CM

## 2018-08-14 PROCEDURE — 99999 PR PBB SHADOW E&M-EST. PATIENT-LVL III: CPT | Mod: PBBFAC,,, | Performed by: PSYCHIATRY & NEUROLOGY

## 2018-08-14 PROCEDURE — 99214 OFFICE O/P EST MOD 30 MIN: CPT | Mod: S$GLB,,, | Performed by: PSYCHIATRY & NEUROLOGY

## 2018-08-14 RX ORDER — LAMOTRIGINE 100 MG/1
100 TABLET ORAL DAILY
Qty: 90 TABLET | Refills: 0 | Status: SHIPPED | OUTPATIENT
Start: 2018-08-14 | End: 2018-11-13 | Stop reason: SDUPTHER

## 2018-08-14 RX ORDER — BUPROPION HYDROCHLORIDE 100 MG/1
100 TABLET, EXTENDED RELEASE ORAL DAILY
Qty: 90 TABLET | Refills: 0 | Status: SHIPPED | OUTPATIENT
Start: 2018-08-14 | End: 2018-11-05 | Stop reason: SDUPTHER

## 2018-08-14 RX ORDER — DULOXETIN HYDROCHLORIDE 30 MG/1
30 CAPSULE, DELAYED RELEASE ORAL DAILY
Qty: 90 CAPSULE | Refills: 0 | Status: SHIPPED | OUTPATIENT
Start: 2018-08-14 | End: 2018-11-05 | Stop reason: SDUPTHER

## 2018-08-14 NOTE — PROGRESS NOTES
"Outpatient Psychiatry Follow up(MD/NP)    8/14/2018    Tiffany Hendrix, a 53 y.o. female, presenting for initial evaluation visit. Met with patient.    Reason for Encounter: follow up*. Patient complains of   Chief Complaint   Patient presents with    Mood Disorder    Anxiety   .    History of Present Illness:     Patient was seen and examined. Her chart was reviewed.     She has been compliant with treatment. She denied ay side effects. She reports good efficac and tolerability. She was taking the 30 mg for about 1 month, then she was given 60 mg tabs at the pharmacy and resumed it ("I wasn't sure what to take"). Her naltrexone ran out, and she was unable to get it filled.    She previously had knee replacement surgery on her right knee- she rehabed and recovered well. Since then, she remians more active at home.  This has been sustained since her last appointment. She had a sleep study and was diagnosed with CAMRON.     Family and marriage are stable and supportive.     She feels well and requests to re-decrease her dosages of cymbalta and wellbutrin. Her symptoms remain in full remission.     Denied Symptoms of Depression: no diminished mood or loss of interest/anhedonia; no irritability, diminished energy, change in sleep, change in appetite, diminished concentration or cognition or indecisiveness, PMA/R, excessive guilt or hopelessness or worthlessness, or suicidal ideations; no anxious features; no episodes that lasted more than 2 weeks or more at a time- episodes last a few days at a time    Denied issues with Sleep: no issues with initiation, maintenance, early morning awakening with inability to return to sleep, or hypersomnolence     Denied Suicidal/Homicidal ideations: no active/passive ideations, organized plans, or future intentions    Denied Symptoms of psychosis: no hallucinations, delusions, disorganized thinking, disorganized behavior or abnormal motor behavior, or negative symptoms     Denied " "past or current Symptoms of aron or hypomania: no elevated, expansive, or irritable mood with increased energy or activity; with no inflated self-esteem or grandiosity, decreased need for sleep, increased rate of speech, FOI or racing thoughts, distractibility, increased goal directed activity or PMA, or risky/disinhibited behavior    Weight problems- current 245 lbs and previously at 249; she would not like to try any weight loss medications at this time; She is pursuing a  A gastric sleave and is going to start seeing the dietician. The procedure is potentially going to occur in 1-2/2019.    A comprehensive ROS was negative.      .  Current Evaluation:     Nutritional Screening: Considering the patient's height and weight, medications, medical history and preferences, should a referral be made to the dietitian? no    Constitutional  Vitals:  Most recent vital signs, dated less than 90 days prior to this appointment, were reviewed.    Vitals:    08/14/18 1124   BP: 118/66   Pulse: 68   Resp: 16   Weight: 111.2 kg (245 lb 2.4 oz)   Height: 5' 6" (1.676 m)     Body mass index is 39.57 kg/m².       General:  unremarkable, age appropriate     Musculoskeletal  Muscle Strength/Tone:  not examined, no dyskinesia, no dystonia, no tremor, no tic   Gait & Station:  non-ataxic     Psychiatric  Speech:  no latency; no press   Mood & Affect:  euthymic "good"  congruent and appropriate   Thought Process:  normal and logical   Associations:  intact   Thought Content:  normal, no suicidality, no homicidality, delusions, or paranoia   Insight:  intact   Judgement: behavior is adequate to circumstances   Orientation:  grossly intact   Memory: intact for content of interview   Language: grossly intact, able to name, able to repeat   Attention Span & Concentration:  able to focus   Fund of Knowledge:  intact and appropriate to age and level of education       Relevant Elements of Neurological Exam: normal gait    Laboratory Data  No " visits with results within 1 Month(s) from this visit.   Latest known visit with results is:   Admission on 03/10/2018, Discharged on 03/12/2018   Component Date Value Ref Range Status    Sodium 03/10/2018 139  136 - 145 mmol/L Final    Potassium 03/10/2018 4.1  3.5 - 5.1 mmol/L Final    Chloride 03/10/2018 103  95 - 110 mmol/L Final    CO2 03/10/2018 21* 23 - 29 mmol/L Final    Glucose 03/10/2018 87  70 - 110 mg/dL Final    BUN, Bld 03/10/2018 10  6 - 20 mg/dL Final    Creatinine 03/10/2018 0.9  0.5 - 1.4 mg/dL Final    Calcium 03/10/2018 10.1  8.7 - 10.5 mg/dL Final    Total Protein 03/10/2018 7.9  6.0 - 8.4 g/dL Final    Albumin 03/10/2018 4.3  3.5 - 5.2 g/dL Final    Total Bilirubin 03/10/2018 0.5  0.1 - 1.0 mg/dL Final    Alkaline Phosphatase 03/10/2018 90  55 - 135 U/L Final    AST 03/10/2018 16  10 - 40 U/L Final    ALT 03/10/2018 20  10 - 44 U/L Final    Anion Gap 03/10/2018 15  8 - 16 mmol/L Final    eGFR if African American 03/10/2018 >60  >60 mL/min/1.73 m^2 Final    eGFR if non African American 03/10/2018 >60  >60 mL/min/1.73 m^2 Final    WBC 03/10/2018 13.57* 3.90 - 12.70 K/uL Final    RBC 03/10/2018 5.08  4.00 - 5.40 M/uL Final    Hemoglobin 03/10/2018 14.7  12.0 - 16.0 g/dL Final    Hematocrit 03/10/2018 44.4  37.0 - 48.5 % Final    MCV 03/10/2018 87  82 - 98 fL Final    MCH 03/10/2018 28.9  27.0 - 31.0 pg Final    MCHC 03/10/2018 33.1  32.0 - 36.0 g/dL Final    RDW 03/10/2018 15.0* 11.5 - 14.5 % Final    Platelets 03/10/2018 247  150 - 350 K/uL Final    MPV 03/10/2018 11.6  9.2 - 12.9 fL Final    Gran # (ANC) 03/10/2018 8.3* 1.8 - 7.7 K/uL Final    Lymph # 03/10/2018 3.9  1.0 - 4.8 K/uL Final    Mono # 03/10/2018 1.1* 0.3 - 1.0 K/uL Final    Eos # 03/10/2018 0.2  0.0 - 0.5 K/uL Final    Baso # 03/10/2018 0.02  0.00 - 0.20 K/uL Final    Gran% 03/10/2018 61.5  38.0 - 73.0 % Final    Lymph% 03/10/2018 28.6  18.0 - 48.0 % Final    Mono% 03/10/2018 8.4  4.0 - 15.0 %  Final    Eosinophil% 03/10/2018 1.4  0.0 - 8.0 % Final    Basophil% 03/10/2018 0.1  0.0 - 1.9 % Final    Differential Method 03/10/2018 Automated   Final    Troponin I 03/10/2018 <0.006  0.000 - 0.026 ng/mL Final    CPK 03/10/2018 84  20 - 180 U/L Final    CPK 03/10/2018 84  20 - 180 U/L Final    CPK MB 03/10/2018 1.5  0.1 - 6.5 ng/mL Final    MB% 03/10/2018 1.8  0.0 - 5.0 % Final    BNP 03/10/2018 <10  0 - 99 pg/mL Final    Blood Culture, Routine 03/10/2018 No growth after 5 days.   Final    Lactate (Lactic Acid) 03/10/2018 3.0* 0.5 - 2.2 mmol/L Final    Specimen UA 03/10/2018 Urine, Clean Catch   Final    Color, UA 03/10/2018 Yellow  Yellow, Straw, [place] Final    Appearance, UA 03/10/2018 Clear  Clear Final    pH, UA 03/10/2018 6.0  5.0 - 8.0 Final    Specific Gravity, UA 03/10/2018 1.010  1.005 - 1.030 Final    Protein, UA 03/10/2018 Trace* Negative Final    Glucose, UA 03/10/2018 Negative  Negative Final    Ketones, UA 03/10/2018 Negative  Negative Final    Bilirubin (UA) 03/10/2018 Negative  Negative Final    Occult Blood UA 03/10/2018 Trace* Negative Final    Nitrite, UA 03/10/2018 Negative  Negative Final    Urobilinogen, UA 03/10/2018 Negative  <2.0 EU/dL Final    Leukocytes, UA 03/10/2018 Negative  Negative Final    Lactate (Lactic Acid) 03/10/2018 0.8  0.5 - 2.2 mmol/L Final    Lactate (Lactic Acid) 03/11/2018 1.1  0.5 - 2.2 mmol/L Final    Phosphorus 03/10/2018 1.3* 2.7 - 4.5 mg/dL Final    Magnesium 03/10/2018 2.6  1.6 - 2.6 mg/dL Final    aPTT 03/10/2018 25.1  21.0 - 32.0 sec Final    Prothrombin Time 03/10/2018 10.1  9.0 - 12.5 sec Final    INR 03/10/2018 1.0  0.8 - 1.2 Final    WBC 03/11/2018 12.99* 3.90 - 12.70 K/uL Final    RBC 03/11/2018 4.32  4.00 - 5.40 M/uL Final    Hemoglobin 03/11/2018 12.7  12.0 - 16.0 g/dL Final    Hematocrit 03/11/2018 38.6  37.0 - 48.5 % Final    MCV 03/11/2018 89  82 - 98 fL Final    MCH 03/11/2018 29.4  27.0 - 31.0 pg Final    MCHC  03/11/2018 32.9  32.0 - 36.0 g/dL Final    RDW 03/11/2018 14.9* 11.5 - 14.5 % Final    Platelets 03/11/2018 193  150 - 350 K/uL Final    MPV 03/11/2018 11.7  9.2 - 12.9 fL Final    Gran # (ANC) 03/11/2018 6.9  1.8 - 7.7 K/uL Final    Lymph # 03/11/2018 4.9* 1.0 - 4.8 K/uL Final    Mono # 03/11/2018 0.9  0.3 - 1.0 K/uL Final    Eos # 03/11/2018 0.3  0.0 - 0.5 K/uL Final    Baso # 03/11/2018 0.02  0.00 - 0.20 K/uL Final    Gran% 03/11/2018 52.7  38.0 - 73.0 % Final    Lymph% 03/11/2018 38.0  18.0 - 48.0 % Final    Mono% 03/11/2018 7.2  4.0 - 15.0 % Final    Eosinophil% 03/11/2018 1.9  0.0 - 8.0 % Final    Basophil% 03/11/2018 0.2  0.0 - 1.9 % Final    Differential Method 03/11/2018 Automated   Final    Sodium 03/11/2018 140  136 - 145 mmol/L Final    Potassium 03/11/2018 3.9  3.5 - 5.1 mmol/L Final    Chloride 03/11/2018 109  95 - 110 mmol/L Final    CO2 03/11/2018 21* 23 - 29 mmol/L Final    Glucose 03/11/2018 104  70 - 110 mg/dL Final    BUN, Bld 03/11/2018 11  6 - 20 mg/dL Final    Creatinine 03/11/2018 0.8  0.5 - 1.4 mg/dL Final    Calcium 03/11/2018 8.7  8.7 - 10.5 mg/dL Final    Total Protein 03/11/2018 6.4  6.0 - 8.4 g/dL Final    Albumin 03/11/2018 3.5  3.5 - 5.2 g/dL Final    Total Bilirubin 03/11/2018 0.5  0.1 - 1.0 mg/dL Final    Alkaline Phosphatase 03/11/2018 71  55 - 135 U/L Final    AST 03/11/2018 15  10 - 40 U/L Final    ALT 03/11/2018 17  10 - 44 U/L Final    Anion Gap 03/11/2018 10  8 - 16 mmol/L Final    eGFR if African American 03/11/2018 >60  >60 mL/min/1.73 m^2 Final    eGFR if non African American 03/11/2018 >60  >60 mL/min/1.73 m^2 Final         Medications  Outpatient Encounter Medications as of 8/14/2018   Medication Sig Dispense Refill    aspirin (ECOTRIN) 81 MG EC tablet Take 81 mg by mouth once daily.      buPROPion (WELLBUTRIN SR) 150 MG TBSR 12 hr tablet Take 1 tablet (150 mg total) by mouth once daily. 90 tablet 1    buPROPion (WELLBUTRIN SR) 150 MG  TBSR 12 hr tablet TAKE 1 TABLET BY MOUTH EVERY DAY 30 tablet 1    calcium carbonate-vitamin D3 (CALCIUM 600 + D,3,) 600 mg calcium- 200 unit Cap Take by mouth.      DULoxetine (CYMBALTA) 30 MG capsule Take 2 capsules (60 mg total) by mouth once daily. 90 capsule 0    estrogens,conjugated,-methyltestosterone 1.25-2.5mg (ESTRATEST) 1.25-2.5 mg per tablet Take 1 tablet by mouth once daily.  5    INV TESTOSTERONE/ANASTROZOL OR PLACEBO PELLETS Inject 1 Pellet into the skin. For investigational use only.      lamoTRIgine (LAMICTAL) 100 MG tablet Take 1 tablet (100 mg total) by mouth once daily. 90 tablet 1    levothyroxine 125 mcg Cap TAKE 1 TABLET ONCE A DAY ORALLY 90 DAYS  1    metoprolol succinate (TOPROL-XL) 25 MG 24 hr tablet Take 50 mg by mouth once daily.       MULTIVIT &MINERALS/FERROUS FUM (MULTI VITAMIN ORAL) Take by mouth.      pantoprazole (PROTONIX) 40 MG tablet Take 40 mg by mouth once daily.      rosuvastatin (CRESTOR) 10 MG tablet Take 20 mg by mouth every evening.        No facility-administered encounter medications on file as of 8/14/2018.            Assessment - Diagnosis - Goals:     Unspecified Depressive Disorder  Unspecified Anxiety Disorder    Morbid obesity     Treatment Plan/Recommendations:       Medications:  Decrease Bupropion SR 75 mg po q DAILY for depression  Decrease Cymbalta to 30 mg po q AM for depression/chronic pain- she would like to try to taper off of this medication if able  Continue Lamictal 100 mg po q day for adjunctive depression/chronic pain  Stop naltrexone- pt no longer taking    Discussed diagnosis, risks and benefits of proposed treatment vs alternative treatments vs no treatment, and potential side effects of these treatments.  The patient expresses understanding of the above and displays the capacity to agree with this treatment given said understanding.  Patient also agrees that, currently, the benefits outweigh the risks and would like to pursue treatment at  this time.    We discussed risk of relapse with lower dosages- she was counseled on s/s of relapse. She and her  will be on guard fo rthem and resume previous dosages and call the clinic if symptoms return.     Therapy:   Not indicated at this time; will refer if/when needed    Counseling:  Counseled on diet and exercise for weight loss/obesity    Return to Clinic: 3 months, sooner if needed    Jey Og MD  Psychiatry

## 2018-08-15 PROBLEM — E78.00 PURE HYPERCHOLESTEROLEMIA: Status: ACTIVE | Noted: 2018-08-15

## 2018-08-15 PROBLEM — E03.9 ACQUIRED HYPOTHYROIDISM: Status: ACTIVE | Noted: 2018-08-15

## 2018-08-15 PROBLEM — E11.9 TYPE 2 DIABETES MELLITUS WITHOUT COMPLICATION, WITHOUT LONG-TERM CURRENT USE OF INSULIN: Status: ACTIVE | Noted: 2018-08-15

## 2018-09-06 RX ORDER — DULOXETIN HYDROCHLORIDE 60 MG/1
60 CAPSULE, DELAYED RELEASE ORAL DAILY
Qty: 90 CAPSULE | Refills: 1 | Status: SHIPPED | OUTPATIENT
Start: 2018-09-06 | End: 2018-11-13

## 2018-10-05 RX ORDER — NALTREXONE HYDROCHLORIDE 50 MG/1
TABLET, FILM COATED ORAL
Qty: 45 TABLET | Refills: 0 | Status: SHIPPED | OUTPATIENT
Start: 2018-10-05 | End: 2018-11-13 | Stop reason: SDUPTHER

## 2018-10-05 RX ORDER — NALTREXONE HYDROCHLORIDE 50 MG/1
50 TABLET, FILM COATED ORAL DAILY
COMMUNITY
End: 2018-10-05 | Stop reason: SDUPTHER

## 2018-11-05 RX ORDER — BUPROPION HYDROCHLORIDE 100 MG/1
100 TABLET, EXTENDED RELEASE ORAL DAILY
Qty: 90 TABLET | Refills: 0 | Status: SHIPPED | OUTPATIENT
Start: 2018-11-05 | End: 2018-11-13 | Stop reason: ALTCHOICE

## 2018-11-05 RX ORDER — DULOXETIN HYDROCHLORIDE 30 MG/1
30 CAPSULE, DELAYED RELEASE ORAL DAILY
Qty: 90 CAPSULE | Refills: 0 | Status: SHIPPED | OUTPATIENT
Start: 2018-11-05 | End: 2018-11-13 | Stop reason: ALTCHOICE

## 2018-11-13 ENCOUNTER — OFFICE VISIT (OUTPATIENT)
Dept: PSYCHIATRY | Facility: CLINIC | Age: 53
End: 2018-11-13
Payer: COMMERCIAL

## 2018-11-13 VITALS
BODY MASS INDEX: 37.96 KG/M2 | WEIGHT: 241.88 LBS | RESPIRATION RATE: 16 BRPM | HEIGHT: 67 IN | SYSTOLIC BLOOD PRESSURE: 116 MMHG | HEART RATE: 74 BPM | DIASTOLIC BLOOD PRESSURE: 70 MMHG

## 2018-11-13 DIAGNOSIS — F32.89 OTHER DEPRESSION: Primary | ICD-10-CM

## 2018-11-13 DIAGNOSIS — E66.01 CLASS 2 SEVERE OBESITY DUE TO EXCESS CALORIES WITH SERIOUS COMORBIDITY AND BODY MASS INDEX (BMI) OF 39.0 TO 39.9 IN ADULT: ICD-10-CM

## 2018-11-13 PROCEDURE — 99214 OFFICE O/P EST MOD 30 MIN: CPT | Mod: S$GLB,,, | Performed by: PSYCHIATRY & NEUROLOGY

## 2018-11-13 PROCEDURE — 99999 PR PBB SHADOW E&M-EST. PATIENT-LVL III: CPT | Mod: PBBFAC,,, | Performed by: PSYCHIATRY & NEUROLOGY

## 2018-11-13 RX ORDER — NALTREXONE HYDROCHLORIDE 50 MG/1
50 TABLET, FILM COATED ORAL DAILY
Qty: 30 TABLET | Refills: 2 | Status: SHIPPED | OUTPATIENT
Start: 2018-11-13 | End: 2019-03-19

## 2018-11-13 RX ORDER — LAMOTRIGINE 100 MG/1
100 TABLET ORAL DAILY
Qty: 90 TABLET | Refills: 0 | Status: SHIPPED | OUTPATIENT
Start: 2018-11-13 | End: 2019-08-23 | Stop reason: SDUPTHER

## 2018-11-13 RX ORDER — CLINDAMYCIN HYDROCHLORIDE 300 MG/1
300 CAPSULE ORAL 2 TIMES DAILY
Refills: 0 | COMMUNITY
Start: 2018-11-11 | End: 2019-01-11

## 2018-11-13 RX ORDER — NEOMYCIN SULFATE, POLYMYXIN B SULFATE AND HYDROCORTISONE 10; 3.5; 1 MG/ML; MG/ML; [USP'U]/ML
SUSPENSION/ DROPS AURICULAR (OTIC)
Refills: 0 | COMMUNITY
Start: 2018-11-07 | End: 2019-01-11

## 2018-11-13 RX ORDER — HYDROCODONE BITARTRATE AND ACETAMINOPHEN 10; 325 MG/1; MG/1
1 TABLET ORAL EVERY 4 HOURS PRN
Refills: 0 | COMMUNITY
Start: 2018-11-11 | End: 2019-01-11

## 2018-11-13 RX ORDER — SULFAMETHOXAZOLE AND TRIMETHOPRIM 800; 160 MG/1; MG/1
1 TABLET ORAL 2 TIMES DAILY
Refills: 0 | COMMUNITY
Start: 2018-11-11 | End: 2019-01-11

## 2018-11-13 NOTE — PROGRESS NOTES
Outpatient Psychiatry Follow up(MD/NP)    11/13/2018    Tiffany Hendrix, a 53 y.o. female, presenting for initial evaluation visit. Met with patient.    Reason for Encounter: follow up*. Patient complains of   Chief Complaint   Patient presents with    Depression    Anxiety   .    History of Present Illness:     Patient was seen and examined. Her chart was reviewed. Reviewed notes by Dr. Breaux from 8/15/18.     She has been compliant with treatment. She denied ay side effects. She reports good efficacy and tolerability. She tolerated the decreased dosage of Cymbalta and Wellbutrin without any complications or worsening of symptoms.     She had some new-medical stressors. She broke her pinky on 10/24- currently in a cast and being followed by her orthopedist. She was also hospitalized for a severe outer-ear infection (improving; still in treatment). She previously had knee replacement surgery on her right knee- she rehabed and recovered well. Since then, she remians more active at home.  This has been sustained since her last appointment. She had a sleep study and was diagnosed with CAMRON.     No new psychosocial stressors have occurred since last seen. Family and marriage are stable and supportive.     She feels well and requests to decrease/stop her dosages of cymbalta and wellbutrin. Her symptoms remain in full remission. She did resume her naltrexone and would like to have it increased to 50 mg daily.    Denied Symptoms of Depression: no diminished mood or loss of interest/anhedonia; no irritability, diminished energy, change in sleep, change in appetite, diminished concentration or cognition or indecisiveness, PMA/R, excessive guilt or hopelessness or worthlessness, or suicidal ideations; no anxious features; no episodes that lasted more than 2 weeks or more at a time- episodes last a few days at a time    Denied issues with Sleep: no issues with initiation, maintenance, early morning awakening with  "inability to return to sleep, or hypersomnolence     Denied Suicidal/Homicidal ideations: no active/passive ideations, organized plans, or future intentions    Denied Symptoms of psychosis: no hallucinations, delusions, disorganized thinking, disorganized behavior or abnormal motor behavior, or negative symptoms     Denied past or current Symptoms of aron or hypomania: no elevated, expansive, or irritable mood with increased energy or activity; with no inflated self-esteem or grandiosity, decreased need for sleep, increased rate of speech, FOI or racing thoughts, distractibility, increased goal directed activity or PMA, or risky/disinhibited behavior    Weight problems- current 241 lbs was at previously at 245 lbs and  249; she would like to resume  weight loss medications at this time; She is pursuing a  A gastric sleave and is going to start seeing the dietician. The procedure is potentially going to occur in 1-2/2019. She continues to make steps towards this goal    A comprehensive ROS was negative aside from left pinky pain.      .  Current Evaluation:     Nutritional Screening: Considering the patient's height and weight, medications, medical history and preferences, should a referral be made to the dietitian? no    Constitutional  Vitals:  Most recent vital signs, dated less than 90 days prior to this appointment, were reviewed.    Vitals:    11/13/18 1129   BP: 116/70   Pulse: 74   Resp: 16   Weight: 109.7 kg (241 lb 13.5 oz)   Height: 5' 7" (1.702 m)     Body mass index is 37.88 kg/m².       General:  unremarkable, age appropriate     Musculoskeletal  Muscle Strength/Tone:  not examined, no dyskinesia, no dystonia, no tremor, no tic   Gait & Station:  non-ataxic     Psychiatric  Speech:  no latency; no press   Mood & Affect:  euthymic "good"  congruent and appropriate   Thought Process:  normal and logical   Associations:  intact   Thought Content:  normal, no suicidality, no homicidality, delusions, or " paranoia   Insight:  intact   Judgement: behavior is adequate to circumstances   Orientation:  grossly intact   Memory: intact for content of interview   Language: grossly intact, able to name, able to repeat   Attention Span & Concentration:  able to focus   Fund of Knowledge:  intact and appropriate to age and level of education       Relevant Elements of Neurological Exam: normal gait    Laboratory Data  No visits with results within 1 Month(s) from this visit.   Latest known visit with results is:   Lab Visit on 08/16/2018   Component Date Value Ref Range Status    Cortisol 08/16/2018 0.90  ug/dL Final         Medications  Outpatient Encounter Medications as of 11/13/2018   Medication Sig Dispense Refill    aspirin (ECOTRIN) 81 MG EC tablet Take 81 mg by mouth once daily.      blood sugar diagnostic Strp 1 each by Misc.(Non-Drug; Combo Route) route once daily. 100 each 3    blood-glucose meter kit Use as instructed. Please provide patient with cheapest per insurance. 1 each 0    buPROPion (WELLBUTRIN SR) 100 MG TBSR 12 hr tablet Take 1 tablet (100 mg total) by mouth once daily. 90 tablet 0    calcium carbonate-vitamin D3 (CALCIUM 600 + D,3,) 600 mg calcium- 200 unit Cap Take by mouth.      clindamycin (CLEOCIN) 300 MG capsule Take 300 mg by mouth 2 (two) times daily.  0    DULoxetine (CYMBALTA) 30 MG capsule Take 1 capsule (30 mg total) by mouth once daily. 90 capsule 0    HYDROcodone-acetaminophen (NORCO)  mg per tablet Take 1 tablet by mouth every 4 (four) hours as needed. for pain.  0    INV TESTOSTERONE/ANASTROZOL OR PLACEBO PELLETS Inject 1 Pellet into the skin. For investigational use only.      lamoTRIgine (LAMICTAL) 100 MG tablet Take 1 tablet (100 mg total) by mouth once daily. 90 tablet 0    levothyroxine 125 mcg Cap Take 125 mcg by mouth once daily. 90 capsule 3    lisinopril (PRINIVIL,ZESTRIL) 5 MG tablet Take 5 mg by mouth once daily.      medical supply, miscellaneous  (MISCELLANEOUS MEDICAL SUPPLY MISC) 150 mg by Misc.(Non-Drug; Combo Route) route.      metoprolol succinate 50 mg CSpX Take 50 mg by mouth once daily.       MULTIVIT &MINERALS/FERROUS FUM (MULTI VITAMIN ORAL) Take by mouth.      naltrexone (DEPADE) 50 mg tablet TAKE 0.5 TABLETS (25 MG TOTAL) BY MOUTH ONCE DAILY. (Patient taking differently: Take 50 mg by mouth once daily. ) 45 tablet 0    neomycin-polymyxin-hydrocortisone (CORTISPORIN) 3.5-10,000-1 mg/mL-unit/mL-% otic suspension INSTILL 4 DROPS INTO AFFECTED EAR(S) BY OTIC ROUTE 3 TIMES PER DAY FOR 7 DAYS  0    pantoprazole (PROTONIX) 40 MG tablet Take 40 mg by mouth once daily.      rosuvastatin (CRESTOR) 20 MG tablet Take 20 mg by mouth every evening.       sulfamethoxazole-trimethoprim 800-160mg (BACTRIM DS) 800-160 mg Tab Take 1 tablet by mouth 2 (two) times daily.  0    [DISCONTINUED] DULoxetine (CYMBALTA) 60 MG capsule TAKE 1 CAPSULE (60 MG TOTAL) BY MOUTH ONCE DAILY. (Patient taking differently: Take 30 mg by mouth once daily. ) 90 capsule 1     No facility-administered encounter medications on file as of 11/13/2018.            Assessment - Diagnosis - Goals:     Unspecified Depressive Disorder  Unspecified Anxiety Disorder    Morbid obesity     Treatment Plan/Recommendations:       Medications:  Stop Bupropion- she would like to try tapering off of it completely   Stop Cymbalta to 30 mg po q AM for depression/chronic pain- she would like to try to taper off of this medication if able  Continue Lamictal 100 mg po q day for adjunctive depression/chronic pain  Resumed and increased naltrexone 50 mg po q day    Discussed diagnosis, risks and benefits of proposed treatment vs alternative treatments vs no treatment, and potential side effects of these treatments.  The patient expresses understanding of the above and displays the capacity to agree with this treatment given said understanding.  Patient also agrees that, currently, the benefits outweigh the  risks and would like to pursue treatment at this time.    We discussed risk of relapse with lower dosages- she was counseled on s/s of relapse. She and her  will be on guard fo rthem and resume previous dosages and call the clinic if symptoms return.     Therapy:   Not indicated at this time; will refer if/when needed    Counseling:  Counseled on diet and exercise for weight loss/obesity    Return to Clinic: 3 months, sooner if needed    Jey Og MD  Psychiatry

## 2019-01-03 ENCOUNTER — TELEPHONE (OUTPATIENT)
Dept: BARIATRICS | Facility: CLINIC | Age: 54
End: 2019-01-03

## 2019-01-03 NOTE — TELEPHONE ENCOUNTER
Returned patient's call. States she completed work up for surgery at [facility]. Needs to have surgery here d/t insurance. Completed psych clearance and MSDs required by insurance. States she will bring all paperwork to initial consult 1/9/19.

## 2019-01-06 ENCOUNTER — DOCUMENTATION ONLY (OUTPATIENT)
Dept: BARIATRICS | Facility: CLINIC | Age: 54
End: 2019-01-06

## 2019-01-07 NOTE — PROGRESS NOTES
Bariatric Surgery Online Course Form Submission  Someone has submitted a Bariatric Surgery Online Course Form Submission on this page.  Date: 01- 12:06:01  Patient's Name: Tiffany Hendrix  YOB: 1965 Email: [email or web address]  Phone: [phone]   Patient Address: [address] [place] [address]  Preferred Surgical Location: [facility] [place]   I certify that I am 18 years of age or older: Yes   Confirmation Code: [id number]  Verification of Bariatric Seminar: yes  Insurance Information  Insurance or Self Pay? Insurance - Fill out fields below  Insurance Company Name: [employer]   Type of Coverage/Coverage Plan (i.e. PPO, HMO): POS II   Group Name: [employer]   Subscriber Name: Collin Mohanrachanasilvina   Member Name (patient's name): Tiffany Hendrix   Member ID/Policy #:[id number]   Plan Effective Date:   Card Issuance date:

## 2019-01-09 ENCOUNTER — TELEPHONE (OUTPATIENT)
Dept: BARIATRICS | Facility: CLINIC | Age: 54
End: 2019-01-09

## 2019-01-09 NOTE — TELEPHONE ENCOUNTER
----- Message from Carissa Valderrama sent at 1/9/2019  8:02 AM CST -----  Contact: patient  Please call pt at [phone]    Patient would like to cancel todays appt and reschedule for another day    Thank you

## 2019-01-11 DIAGNOSIS — E03.9 ACQUIRED HYPOTHYROIDISM: ICD-10-CM

## 2019-01-11 DIAGNOSIS — E78.00 PURE HYPERCHOLESTEROLEMIA: ICD-10-CM

## 2019-01-11 DIAGNOSIS — E11.9 TYPE 2 DIABETES MELLITUS WITHOUT COMPLICATION, WITHOUT LONG-TERM CURRENT USE OF INSULIN: ICD-10-CM

## 2019-01-11 DIAGNOSIS — E66.9 OBESITY, UNSPECIFIED CLASSIFICATION, UNSPECIFIED OBESITY TYPE, UNSPECIFIED WHETHER SERIOUS COMORBIDITY PRESENT: Primary | ICD-10-CM

## 2019-01-11 DIAGNOSIS — E66.01 CLASS 2 SEVERE OBESITY DUE TO EXCESS CALORIES WITH SERIOUS COMORBIDITY AND BODY MASS INDEX (BMI) OF 39.0 TO 39.9 IN ADULT: Primary | ICD-10-CM

## 2019-01-14 ENCOUNTER — INITIAL CONSULT (OUTPATIENT)
Dept: BARIATRICS | Facility: CLINIC | Age: 54
End: 2019-01-14
Payer: COMMERCIAL

## 2019-01-14 ENCOUNTER — HOSPITAL ENCOUNTER (OUTPATIENT)
Dept: CARDIOLOGY | Facility: CLINIC | Age: 54
Discharge: HOME | End: 2019-01-14
Payer: COMMERCIAL

## 2019-01-14 ENCOUNTER — CLINICAL SUPPORT (OUTPATIENT)
Dept: BARIATRICS | Facility: CLINIC | Age: 54
End: 2019-01-14
Payer: COMMERCIAL

## 2019-01-14 ENCOUNTER — HOSPITAL ENCOUNTER (OUTPATIENT)
Dept: RADIOLOGY | Facility: HOSPITAL | Age: 54
Discharge: HOME | End: 2019-01-14
Attending: PHYSICIAN ASSISTANT
Payer: COMMERCIAL

## 2019-01-14 ENCOUNTER — TELEPHONE (OUTPATIENT)
Dept: BARIATRICS | Facility: CLINIC | Age: 54
End: 2019-01-14

## 2019-01-14 VITALS
SYSTOLIC BLOOD PRESSURE: 132 MMHG | HEIGHT: 67 IN | WEIGHT: 242.31 LBS | DIASTOLIC BLOOD PRESSURE: 72 MMHG | HEART RATE: 77 BPM | BODY MASS INDEX: 38.03 KG/M2

## 2019-01-14 DIAGNOSIS — E78.00 PURE HYPERCHOLESTEROLEMIA: ICD-10-CM

## 2019-01-14 DIAGNOSIS — K21.9 GASTROESOPHAGEAL REFLUX DISEASE WITHOUT ESOPHAGITIS: ICD-10-CM

## 2019-01-14 DIAGNOSIS — E66.01 CLASS 2 SEVERE OBESITY DUE TO EXCESS CALORIES WITH SERIOUS COMORBIDITY AND BODY MASS INDEX (BMI) OF 37.0 TO 37.9 IN ADULT: Primary | ICD-10-CM

## 2019-01-14 DIAGNOSIS — F32.89 OTHER DEPRESSION: ICD-10-CM

## 2019-01-14 DIAGNOSIS — Z71.3 DIETARY COUNSELING: ICD-10-CM

## 2019-01-14 DIAGNOSIS — I10 ESSENTIAL HYPERTENSION: ICD-10-CM

## 2019-01-14 DIAGNOSIS — F41.9 ANXIETY: ICD-10-CM

## 2019-01-14 DIAGNOSIS — E66.9 OBESITY, UNSPECIFIED CLASSIFICATION, UNSPECIFIED OBESITY TYPE, UNSPECIFIED WHETHER SERIOUS COMORBIDITY PRESENT: ICD-10-CM

## 2019-01-14 DIAGNOSIS — E11.9 TYPE 2 DIABETES MELLITUS WITHOUT COMPLICATION, WITHOUT LONG-TERM CURRENT USE OF INSULIN: ICD-10-CM

## 2019-01-14 DIAGNOSIS — E66.9 OBESITY (BMI 30-39.9): ICD-10-CM

## 2019-01-14 DIAGNOSIS — E66.01 CLASS 2 SEVERE OBESITY DUE TO EXCESS CALORIES WITH SERIOUS COMORBIDITY AND BODY MASS INDEX (BMI) OF 39.0 TO 39.9 IN ADULT: ICD-10-CM

## 2019-01-14 DIAGNOSIS — E03.9 ACQUIRED HYPOTHYROIDISM: ICD-10-CM

## 2019-01-14 DIAGNOSIS — G47.33 OSA (OBSTRUCTIVE SLEEP APNEA): ICD-10-CM

## 2019-01-14 DIAGNOSIS — Z72.0 TOBACCO ABUSE: ICD-10-CM

## 2019-01-14 PROCEDURE — 93010 ELECTROCARDIOGRAM REPORT: CPT | Mod: S$GLB,,, | Performed by: INTERNAL MEDICINE

## 2019-01-14 PROCEDURE — 93005 ELECTROCARDIOGRAM TRACING: CPT | Mod: S$GLB,,, | Performed by: PHYSICIAN ASSISTANT

## 2019-01-14 PROCEDURE — 93005 EKG 12-LEAD: ICD-10-PCS | Mod: S$GLB,,, | Performed by: PHYSICIAN ASSISTANT

## 2019-01-14 PROCEDURE — 99499 NO LOS: ICD-10-PCS | Mod: S$GLB,,, | Performed by: DIETITIAN, REGISTERED

## 2019-01-14 PROCEDURE — 97802 MEDICAL NUTRITION INDIV IN: CPT | Mod: S$GLB,,, | Performed by: DIETITIAN, REGISTERED

## 2019-01-14 PROCEDURE — 99205 PR OFFICE/OUTPT VISIT, NEW, LEVL V, 60-74 MIN: ICD-10-PCS | Mod: S$GLB,,, | Performed by: PHYSICIAN ASSISTANT

## 2019-01-14 PROCEDURE — 99999 PR PBB SHADOW E&M-EST. PATIENT-LVL III: ICD-10-PCS | Mod: PBBFAC,,, | Performed by: PHYSICIAN ASSISTANT

## 2019-01-14 PROCEDURE — 71046 X-RAY EXAM CHEST 2 VIEWS: CPT | Mod: 26,,, | Performed by: RADIOLOGY

## 2019-01-14 PROCEDURE — 97802 PR MED NUTR THER, 1ST, INDIV, EA 15 MIN: ICD-10-PCS | Mod: S$GLB,,, | Performed by: DIETITIAN, REGISTERED

## 2019-01-14 PROCEDURE — 93010 EKG 12-LEAD: ICD-10-PCS | Mod: S$GLB,,, | Performed by: INTERNAL MEDICINE

## 2019-01-14 PROCEDURE — 99205 OFFICE O/P NEW HI 60 MIN: CPT | Mod: S$GLB,,, | Performed by: PHYSICIAN ASSISTANT

## 2019-01-14 PROCEDURE — 71046 X-RAY EXAM CHEST 2 VIEWS: CPT | Mod: TC,FY

## 2019-01-14 PROCEDURE — 99499 UNLISTED E&M SERVICE: CPT | Mod: S$GLB,,, | Performed by: DIETITIAN, REGISTERED

## 2019-01-14 PROCEDURE — 71046 XR CHEST PA AND LATERAL: ICD-10-PCS | Mod: 26,,, | Performed by: RADIOLOGY

## 2019-01-14 PROCEDURE — 99999 PR PBB SHADOW E&M-EST. PATIENT-LVL III: CPT | Mod: PBBFAC,,, | Performed by: PHYSICIAN ASSISTANT

## 2019-01-14 NOTE — PROGRESS NOTES
NUTRITIONAL CONSULT    Referring Physician: Dr. Danielle  Reason for MNT Referral: Initial assessment for sleeve gastrectomy work-up    PAST MEDICAL HISTORY:   53 y.o. female  There is no height or weight on file to calculate BMI.     Weight history includes Pt states that she has always been overweight, dropped 115 lbs doing weight watchers, kept off until her thyroid issues started. 261 highest weight. Patient states that she feels comfortable at 130-140.    Dieting attempts include weight watchers, exercising, contrave with little long lasting results.      Past Medical History:   Diagnosis Date    Acquired hypothyroidism 8/15/2018    Anemia     2016    Anxiety     Arthritis     Bipolar 1 disorder     Depression     Disorder of kidney and ureter     nephritis     Encounter for blood transfusion     2006, post spinal surgery     GERD (gastroesophageal reflux disease)     over 20 years    Hypertension     Hypothyroidism     Nontoxic multinodular goiter     Obesity     Pure hypercholesterolemia 8/15/2018    Type 2 diabetes mellitus without complication, without long-term current use of insulin 8/15/2018       CLINICAL DATA:  53 y.o.-year-old White female.  Height: 5 ft. 7 in.  Weight: 242 lbs  IBW: 147 lbs  BMI: 37.9  The patient's goal weight (50% EBW): 155 lbs  Personal goal weight: 150 lbs    Goal for Bariatric Surgery: to improve health, to improve quality of life, to lose weight and to prevent future medical conditions    NUTRITIONAL NEEDS:  9348-0124 Calories (for 1-2 lbs weight loss per week)   Grams Protein    NUTRITION & HEALTH HISTORY:  Greatest challenge: starchy CHO and portion control    Current diet recall:      B: Premier shake, 3 prunes  Sn: Premier shake, grapefruit  L: 3oz pan fried chicken tenders, yellow squash/zucchini, apple, tomato  D: pork chop, mac and cheese, broccoli    Current Diet:  Meal pattern: 3 meals and 1-2 snacks  Protein supplements: Premier shakes  1-2/day  Snacking: mostly healthy choices  Vegetables: Likes a variety. Eats daily.  Fruits: Likes a variety. Eats daily.  Beverages: water and sugar-free beverages  Dining out: Weekly. Reduced lately.   Cooking at home: Daily. Mostly baked, grilled and smothered meat, fish, starchy CHO and vegetables.    Exercise:  Past exercise: Fair    Current exercise: Fair  Restrictions to exercise: none    Vitamins / Minerals / Herbs:   Purchased Fusion bariatric vitamins for after surgery      Labs:   reviewed    Social:  Lives with her .  Grocery shopping and food prep done by both.  Patient believes the household will be supportive after surgery.  Alcohol: None.  Smoking: Trying to quit. Down to 5 cigarettes per day.    ASSESSMENT:  · Patient reports attempts at weight loss, only to regain lost weight.  · Patient demonstrated knowledge of healthy eating behaviors and exercise patterns; admits to not eating healthy and not exercising at this point.  · Patient demonstrates willingness to change lifestyle and make behavior modifications as evidenced by daily food logs, dietary changes, including protein drinks, increased fruits, increased vegetables and healthier cooking at home.    Insurance requires 6 months medically supervised diet prior to consideration for bariatric surgery. This was completed through an o/s program.    Barriers to Education: none    Stage of change: action    NUTRITION DIAGNOSIS:    Obesity related to Excessive carbohydrate intake as evidence by BMI.    BARIATRIC DIET DISCUSSION/PLAN:  Discussed diet after surgery and related to patient's food record.  Reviewed nutrition guidelines for before and after surgery.  Answered all questions.  Continue to review Bariatric Nutrition Guidebook at home and call with any questions.  Work on Bariatric Nutrition Checklist.  Work on gradually cutting back on starchy CHO in the diet.    RECOMMENDATIONS:  Patient is a good candidate for bariatric surgery -  Gastric Sleeve.    Patient and Spouse verbalized understanding.    Expect good  compliance after surgery at this time.    Communicated nutrition plan with bariatric team.    SESSION TIME:  30 minutes

## 2019-01-14 NOTE — PATIENT INSTRUCTIONS
Prior to surgery you will need to complete:  - Dietitian consult and follow up appointments as needed  - Endocrinology clearance  - Labs  - Chest X-ray  - EKG  - Psychological evaluation, Please call psychiatry [phone] to schedule  - EGD ( Dr. Rico)     In preparation for bariatric surgery, please complete the following:   · Discuss your current medications with your primary care provider, remember your medications will need to be crushed, chewable, or in liquid form for the first 3-6 months after a gastric bypass or sleeve.  For a gastric band, your medications will need to be crushed indefinitely.    · If you take a blood thinner such as: Coumadin (warfarin), Pradaxa (dabigatran), or Plavix (clopidogrel), you will need to speak with your prescribing provider on how or if this medication can be stopped before surgery.   · If you take a medication for depression or anxiety, you will need to begin crushing or opening the capsule 1-3 months prior to surgery.  Remember to discuss this with the psychologist or psychiatrist that you see.   · If you take medication for arthritis on a daily basis that is considered a non-steroidal anti-inflammatory (NSAID), please discuss with your prescribing physician an alternative medication.  After having gastric bypass or gastric sleeve, this group of medications is not appropriate to take due to increased risk of bleeding stomach ulcers.      DEFINITIONS  Appointments: Pre-scheduled meetings or consultations with any physician, advanced practice provider, dietitian, or psychologist, and labs, imaging studies, sleep studies, etc.   Late cancellation: Cancelling an appointment 24-48 hours prior to scheduled time.  No-Show appointment:  is when    You do NOT arrive to your appointment at the time its scheduled.   You call to cancel or cancel via [facility] less than 24 hours in advance of your scheduled appointment   You show up 15 minutes AFTER your scheduled  appointment time without any notification of being late.     POLICY  1. You are allowed up to 3 cancellations for appointments.    After 3 cancellations your case will be placed on hold for 2 months and after that time you can resume the program.   2. You are allowed only 1 no-show for an appointment.    You will be re-scheduled one time and if there is a 2nd no-show at any point, your case will be placed on hold for 3 months.  After 3 months you can resume the program.     3. Upon resuming the program after being placed on hold for either above mentioned reasons, if you have a late cancel or no show for any appointment, the bariatric team will review if youre an appropriate candidate for surgery at the monthly meeting.

## 2019-01-14 NOTE — PROGRESS NOTES
BARIATRIC NEW PATIENT EVALUATION    CHIEF COMPLAINT:   Morbid Obesity Body mass index is 37.95 kg/m². and inability to lose weight.    HISTORY OF PRESENT ILLNESS:  Tiffany Hendrix  is a 53 y.o. female presenting for morbid obesity Body mass index is 37.95 kg/m². and inability to lose weight. Pt states that she has always been overweight, dropped 115 lbs doing weight watchers, kept off until her thyroid issues started.  The patient has tried weight watchers, exercising, contrave.  261 highest weight. Patient states that she feels comfortable at 130-140.       PAST MEDICAL HISTORY:  Past Medical History:   Diagnosis Date    Acquired hypothyroidism 8/15/2018    Anemia     2016    Anxiety     Arthritis     Bipolar 1 disorder     Depression     Disorder of kidney and ureter     nephritis     Encounter for blood transfusion     , post spinal surgery     GERD (gastroesophageal reflux disease)     over 20 years    Hypertension     Hypothyroidism     Nontoxic multinodular goiter     Obesity     Pure hypercholesterolemia 8/15/2018    Type 2 diabetes mellitus without complication, without long-term current use of insulin 8/15/2018         PAST SURGICAL HISTORY:  Past Surgical History:   Procedure Laterality Date    BACK SURGERY       SECTION      x2    HYSTERECTOMY      KNEE SURGERY      THYROID SURGERY      WRIST SURGERY         FAMILY HISTORY:  Family History   Problem Relation Age of Onset    Diabetes Father     Cancer Father         bladder and prostate    Diabetes Maternal Uncle     Heart disease Mother     No Known Problems Sister     Nephrolithiasis Brother     No Known Problems Daughter     No Known Problems Son     Thyroid cancer Neg Hx        SOCIAL HISTORY:  Social History     Socioeconomic History    Marital status:      Spouse name: Collin    Number of children: 2    Years of education: Not on file    Highest education level: Not on file   Social Needs     Financial resource strain: Not on file    Food insecurity - worry: Not on file    Food insecurity - inability: Not on file    Transportation needs - medical: Not on file    Transportation needs - non-medical: Not on file   Occupational History    Not on file   Tobacco Use    Smoking status: Current Every Day Smoker     Packs/day: 0.25     Years: 15.00     Pack years: 3.75     Types: Cigarettes    Smokeless tobacco: Never Used   Substance and Sexual Activity    Alcohol use: Yes     Alcohol/week: 2.0 oz     Types: 4 Standard drinks or equivalent per week     Comment: socially    Drug use: No    Sexual activity: Yes     Partners: Male     Birth control/protection: Surgical   Other Topics Concern    Patient feels they ought to cut down on drinking/drug use Not Asked    Patient annoyed by others criticizing their drinking/drug use Not Asked    Patient has felt bad or guilty about drinking/drug use Not Asked    Patient has had a drink/used drugs as an eye opener in the AM Not Asked   Social History Narrative    2 children       MEDICATIONS:  Current Outpatient Medications   Medication Sig Dispense Refill    aspirin (ECOTRIN) 81 MG EC tablet Take 81 mg by mouth once daily.      benzoyl peroxide-erythromycin (BENZAMYCIN) gel Apply to affected area every day at bedtime  2    blood sugar diagnostic Strp 1 each by Misc.(Non-Drug; Combo Route) route once daily. 100 each 3    blood-glucose meter kit Use as instructed. Please provide patient with cheapest per insurance. 1 each 0    buPROPion (WELLBUTRIN) 75 MG tablet Take 75 mg by mouth once daily.      calcium carbonate-vitamin D3 (CALCIUM 600 + D,3,) 600 mg calcium- 200 unit Cap Take by mouth.      DULoxetine (CYMBALTA) 30 MG capsule Take 30 mg by mouth once daily.      INV TESTOSTERONE/ANASTROZOL OR PLACEBO PELLETS Inject 1 Pellet into the skin. For investigational use only.      lamoTRIgine (LAMICTAL) 100 MG tablet Take 1 tablet (100 mg total) by mouth  "once daily. 90 tablet 0    levothyroxine (SYNTHROID) 125 MCG tablet Take 1 tablet (125 mcg total) by mouth once daily. 90 tablet 3    lisinopril (PRINIVIL,ZESTRIL) 5 MG tablet Take 5 mg by mouth once daily.      medical supply, miscellaneous (MISCELLANEOUS MEDICAL SUPPLY MISC) 150 mg by Misc.(Non-Drug; Combo Route) route.      metoprolol succinate 50 mg CSpX Take 50 mg by mouth once daily.       MULTIVIT &MINERALS/FERROUS FUM (MULTI VITAMIN ORAL) Take by mouth.      naltrexone (DEPADE) 50 mg tablet Take 1 tablet (50 mg total) by mouth once daily. 30 tablet 2    pantoprazole (PROTONIX) 40 MG tablet Take 40 mg by mouth once daily.      rosuvastatin (CRESTOR) 20 MG tablet Take 20 mg by mouth every evening.        No current facility-administered medications for this visit.        ALLERGIES:  Review of patient's allergies indicates:   Allergen Reactions    Neuromuscular blockers, steroidal Itching    Iodinated contrast- oral and iv dye Hives    Morphine Itching    Tramadol     Adhesive Rash    Corticosteroids (glucocorticoids) Itching       ROS:  Review of Systems   Constitutional: Negative for chills and fever.   HENT: Negative for tinnitus.    Eyes: Negative for blurred vision and double vision.   Respiratory: Negative for cough and shortness of breath.    Cardiovascular: Negative for chest pain, palpitations and leg swelling.   Gastrointestinal: Negative for abdominal pain, constipation, diarrhea, heartburn, nausea and vomiting.   Genitourinary: Negative for dysuria and hematuria.   Musculoskeletal: Positive for back pain (chronic) and joint pain (chronic ). Negative for neck pain.   Skin: Negative for rash.   Neurological: Negative for dizziness, tingling and headaches.   Psychiatric/Behavioral: Negative for depression and suicidal ideas. The patient is not nervous/anxious.        PE:  Vitals:    01/14/19 0925   BP: 132/72   Pulse: 77   Weight: 109.9 kg (242 lb 4.6 oz)   Height: 5' 7" (1.702 m) "       Physical Exam   Constitutional: She is oriented to person, place, and time. She appears well-developed and well-nourished.   HENT:   Head: Normocephalic and atraumatic.   Eyes: Conjunctivae and EOM are normal. Pupils are equal, round, and reactive to light.   Neck: Normal range of motion. Neck supple. No JVD present. No thyromegaly present.   Cardiovascular: Normal rate, regular rhythm, normal heart sounds and intact distal pulses.   No murmur heard.  Pulmonary/Chest: Effort normal and breath sounds normal. She has no wheezes.   Abdominal: Soft. Bowel sounds are normal. She exhibits no distension and no mass. There is no tenderness. There is no guarding. No hernia.   Musculoskeletal: Normal range of motion. She exhibits no edema.   Neurological: She is alert and oriented to person, place, and time. She displays normal reflexes.   Skin: Skin is warm and dry. Capillary refill takes less than 2 seconds. No rash noted. She is not diaphoretic. No erythema.   Psychiatric: She has a normal mood and affect. Her behavior is normal. Judgment and thought content normal.        DIAGNOSIS:  1. Morbid Obesity with Body mass index is 37.95 kg/m². and inability to lose weight.  2. Co-morbidities: DM type 2, HLD, HTN, anxiety/depression, CAMRON, GERD    PLAN:  The patient is  Good  candidate for Bariatric Surgery. she is interested in sleeve gastrectomy with Dr. Danielle. The surgery and post-op care were discussed in detail with the patient. All questions were answered.      Discussed with patient her medical history, extensively her GERD and her wanting a gastric sleeve, explained to patient that with the sleeve GERD can increase tremendously, which patient states understanding. Pt has not has an EGD completed since 2007, will have to order an EGD to re-evaluate. Explained to the patient that the sleeve maybe dependent on the results of her EGD.     she understands that bariatric surgery is a tool to aid in weight loss and that  she needs to be committed to the diet and exercise post-operatively for successful weight loss.  Discussed expected weight loss outcomes after surgery which is 50% of the excess weight on her frame.  Goal weight is 155#s.  Discussed with patient that bariatric surgery is not the easy way out and that it will take plenty of dedication on the patient's part to be successful. Also discussed the possibility of weight regain if the patient strays from the diet guidelines or exercise requirements. Patient verbalized understanding and wishes to proceed with the work-up.    ORDERS:  1. Chest X-Ray, EKG and EGD   2. Psychological Consult, Bariatric Dietician Consult and Endocrinology clearance   3. Bariatric Labs: BMP, CBC, Folate Serum, H. pylori, HgA1C, Hepatic Panel/LFT, Iron & TIBC, Lipid Profile, Magnesium, Phosphate, T3, T4, TSH, Free T4, Vitamin B12, and Vitamin B1.  4.Smoking Cessation program   5. Mental health contract   6. EGD will be done by personal GI Dr. Rico ( GI with [facility])   7. Will contact Dr Breaux for clearance in endocrine    Primary Physician: Fabio Celeste MD  RTC: As scheduled.    60 minute visit, over 50% of time spent counseling patient face to face on surgical options, risks, benefits, expected diet, recommended exercise regimen, and expected weight loss.

## 2019-01-14 NOTE — TELEPHONE ENCOUNTER
Left msg for pt to take an OTC multivitamin with at least 18 mg iron.  Left contact information for pt   ----- Message from Chula Pappas PA-C sent at 1/14/2019  1:15 PM CST -----  Reviewed labs, mildly decreased iron, will have dieticians call; to have MVC with iron added twice daily, no changes on CBC no need for prescription iron tablets. Ok to continue with consult orders.

## 2019-01-21 ENCOUNTER — PATIENT MESSAGE (OUTPATIENT)
Dept: BARIATRICS | Facility: CLINIC | Age: 54
End: 2019-01-21

## 2019-02-04 RX ORDER — DULOXETIN HYDROCHLORIDE 30 MG/1
CAPSULE, DELAYED RELEASE ORAL
Qty: 90 CAPSULE | Refills: 0 | OUTPATIENT
Start: 2019-02-04

## 2019-02-04 RX ORDER — BUPROPION HYDROCHLORIDE 100 MG/1
TABLET, EXTENDED RELEASE ORAL
Qty: 90 TABLET | Refills: 0 | OUTPATIENT
Start: 2019-02-04

## 2019-02-07 RX ORDER — DULOXETIN HYDROCHLORIDE 30 MG/1
CAPSULE, DELAYED RELEASE ORAL
Qty: 90 CAPSULE | Refills: 0 | OUTPATIENT
Start: 2019-02-07

## 2019-02-11 ENCOUNTER — OFFICE VISIT (OUTPATIENT)
Dept: PSYCHIATRY | Facility: CLINIC | Age: 54
End: 2019-02-11
Payer: COMMERCIAL

## 2019-02-11 VITALS
RESPIRATION RATE: 19 BRPM | HEIGHT: 67 IN | HEART RATE: 87 BPM | SYSTOLIC BLOOD PRESSURE: 135 MMHG | WEIGHT: 246.94 LBS | BODY MASS INDEX: 38.76 KG/M2 | DIASTOLIC BLOOD PRESSURE: 82 MMHG

## 2019-02-11 DIAGNOSIS — F32.89 OTHER DEPRESSION: ICD-10-CM

## 2019-02-11 DIAGNOSIS — E66.01 CLASS 2 SEVERE OBESITY DUE TO EXCESS CALORIES WITH SERIOUS COMORBIDITY AND BODY MASS INDEX (BMI) OF 39.0 TO 39.9 IN ADULT: ICD-10-CM

## 2019-02-11 DIAGNOSIS — F41.9 ANXIETY: Primary | ICD-10-CM

## 2019-02-11 PROCEDURE — 99214 OFFICE O/P EST MOD 30 MIN: CPT | Mod: S$GLB,,, | Performed by: PSYCHIATRY & NEUROLOGY

## 2019-02-11 PROCEDURE — 99214 PR OFFICE/OUTPT VISIT, EST, LEVL IV, 30-39 MIN: ICD-10-PCS | Mod: S$GLB,,, | Performed by: PSYCHIATRY & NEUROLOGY

## 2019-02-11 PROCEDURE — 99999 PR PBB SHADOW E&M-EST. PATIENT-LVL III: CPT | Mod: PBBFAC,,, | Performed by: PSYCHIATRY & NEUROLOGY

## 2019-02-11 PROCEDURE — 99999 PR PBB SHADOW E&M-EST. PATIENT-LVL III: ICD-10-PCS | Mod: PBBFAC,,, | Performed by: PSYCHIATRY & NEUROLOGY

## 2019-02-11 NOTE — PROGRESS NOTES
"Outpatient Psychiatry Follow up(MD/NP)    2/11/2019    Tiffany Hendrix, a 53 y.o. female, presenting for initial evaluation visit. Met with patient.    Reason for Encounter: follow up*. Patient complains of   Chief Complaint   Patient presents with    Depression    Anxiety   .    History of Present Illness:     Patient was seen and examined. Her chart was reviewed. Reviewed notes by Ruth Butler at 1/14/2019  1:42 PM;   .     She has been compliant with treatment. She denied ay side effects. She reports good efficacy and tolerability. She tolerated the decreased dosage of Cymbalta and Wellbutrin without any complications or worsening of symptoms.     She had no new-medical stressors since last seen. She finger healed well. She was previously hospitalized for a severe outer-ear infection (improved; still in treatment). She previously had knee replacement surgery on her right knee- she rehabed and recovered well. Since then, she remians more active at home.  This has been sustained since her last appointment. She had a sleep study and was diagnosed with CAMRON.     No new psychosocial stressors have occurred since last seen. Family and marriage are stable and supportive.     She did not taper off of Wellbutirn or Cymblata, She decided that she wanted to delay decreasing these meds for now. She continues with naltrexone. She reports that "I'm good" with sustained full remission of symptoms as documented below.     Denied Symptoms of Depression: no diminished mood or loss of interest/anhedonia; no irritability, diminished energy, change in sleep, change in appetite, diminished concentration or cognition or indecisiveness, PMA/R, excessive guilt or hopelessness or worthlessness, or suicidal ideations; no anxious features; no episodes that lasted more than 2 weeks or more at a time- episodes last a few days at a time    Denied issues with Sleep: no issues with initiation, maintenance, early morning awakening with " "inability to return to sleep, or hypersomnolence     Denied Suicidal/Homicidal ideations: no active/passive ideations, organized plans, or future intentions    Denied Symptoms of psychosis: no hallucinations, delusions, disorganized thinking, disorganized behavior or abnormal motor behavior, or negative symptoms     Denied past or current Symptoms of aron or hypomania: no elevated, expansive, or irritable mood with increased energy or activity; with no inflated self-esteem or grandiosity, decreased need for sleep, increased rate of speech, FOI or racing thoughts, distractibility, increased goal directed activity or PMA, or risky/disinhibited behavior    Weight problems- current 246 lbs was at previously at 245 lbs and  249; she continues with weight loss medications at this time; She is pursuing a  A gastric sleave and saw a dietician. The procedure is potentially going to occur in the near furute. She continues to make steps towards this goal    She reports that she has not used nicotine since 1/21/19; she has found wellbutrin to be helpful./     A comprehensive ROS was negative aside from left pinky pain.      .  Current Evaluation:     Nutritional Screening: Considering the patient's height and weight, medications, medical history and preferences, should a referral be made to the dietitian? no    Constitutional  Vitals:  Most recent vital signs, dated less than 90 days prior to this appointment, were reviewed.    Vitals:    02/11/19 1124   BP: 135/82   Pulse: 87   Resp: 19   Weight: 112 kg (246 lb 14.6 oz)   Height: 5' 7" (1.702 m)     Body mass index is 38.67 kg/m².       General:  unremarkable, age appropriate     Musculoskeletal  Muscle Strength/Tone:  not examined, no dyskinesia, no dystonia, no tremor, no tic   Gait & Station:  non-ataxic     Psychiatric  Speech:  no latency; no press   Mood & Affect:  euthymic "good"  congruent and appropriate   Thought Process:  normal and logical   Associations:  intact "   Thought Content:  normal, no suicidality, no homicidality, delusions, or paranoia   Insight:  intact   Judgement: behavior is adequate to circumstances   Orientation:  grossly intact   Memory: intact for content of interview   Language: grossly intact, able to name, able to repeat   Attention Span & Concentration:  able to focus   Fund of Knowledge:  intact and appropriate to age and level of education       Relevant Elements of Neurological Exam: normal gait    Laboratory Data  Lab Visit on 01/14/2019   Component Date Value Ref Range Status    Sodium 01/14/2019 137  136 - 145 mmol/L Final    Potassium 01/14/2019 4.2  3.5 - 5.1 mmol/L Final    Chloride 01/14/2019 104  95 - 110 mmol/L Final    CO2 01/14/2019 23  23 - 29 mmol/L Final    Glucose 01/14/2019 106  70 - 110 mg/dL Final    BUN, Bld 01/14/2019 11  6 - 20 mg/dL Final    Creatinine 01/14/2019 0.8  0.5 - 1.4 mg/dL Final    Calcium 01/14/2019 10.3  8.7 - 10.5 mg/dL Final    Anion Gap 01/14/2019 10  8 - 16 mmol/L Final    eGFR if African American 01/14/2019 >60.0  >60 mL/min/1.73 m^2 Final    eGFR if non African American 01/14/2019 >60.0  >60 mL/min/1.73 m^2 Final    WBC 01/14/2019 10.80  3.90 - 12.70 K/uL Final    RBC 01/14/2019 5.15  4.00 - 5.40 M/uL Final    Hemoglobin 01/14/2019 15.0  12.0 - 16.0 g/dL Final    Hematocrit 01/14/2019 46.5  37.0 - 48.5 % Final    MCV 01/14/2019 90  82 - 98 fL Final    MCH 01/14/2019 29.1  27.0 - 31.0 pg Final    MCHC 01/14/2019 32.3  32.0 - 36.0 g/dL Final    RDW 01/14/2019 14.3  11.5 - 14.5 % Final    Platelets 01/14/2019 256  150 - 350 K/uL Final    MPV 01/14/2019 11.3  9.2 - 12.9 fL Final    Immature Granulocytes 01/14/2019 0.4  0.0 - 0.5 % Final    Gran # (ANC) 01/14/2019 6.7  1.8 - 7.7 K/uL Final    Immature Grans (Abs) 01/14/2019 0.04  0.00 - 0.04 K/uL Final    Lymph # 01/14/2019 3.3  1.0 - 4.8 K/uL Final    Mono # 01/14/2019 0.6  0.3 - 1.0 K/uL Final    Eos # 01/14/2019 0.1  0.0 - 0.5 K/uL  Final    Baso # 01/14/2019 0.02  0.00 - 0.20 K/uL Final    nRBC 01/14/2019 0  0 /100 WBC Final    Gran% 01/14/2019 61.7  38.0 - 73.0 % Final    Lymph% 01/14/2019 30.7  18.0 - 48.0 % Final    Mono% 01/14/2019 5.8  4.0 - 15.0 % Final    Eosinophil% 01/14/2019 1.2  0.0 - 8.0 % Final    Basophil% 01/14/2019 0.2  0.0 - 1.9 % Final    Differential Method 01/14/2019 Automated   Final    Folate 01/14/2019 14.7  4.0 - 24.0 ng/mL Final    H Pylori IgG Interp 01/14/2019 Negative  Negative Final    Hemoglobin A1C 01/14/2019 5.8* 4.0 - 5.6 % Final    Estimated Avg Glucose 01/14/2019 120  68 - 131 mg/dL Final    Total Protein 01/14/2019 7.8  6.0 - 8.4 g/dL Final    Albumin 01/14/2019 4.1  3.5 - 5.2 g/dL Final    Total Bilirubin 01/14/2019 0.3  0.1 - 1.0 mg/dL Final    Bilirubin, Direct 01/14/2019 0.2  0.1 - 0.3 mg/dL Final    AST 01/14/2019 20  10 - 40 U/L Final    ALT 01/14/2019 28  10 - 44 U/L Final    Alkaline Phosphatase 01/14/2019 76  55 - 135 U/L Final    Iron 01/14/2019 57  30 - 160 ug/dL Final    Transferrin 01/14/2019 308  200 - 375 mg/dL Final    TIBC 01/14/2019 456* 250 - 450 ug/dL Final    Saturated Iron 01/14/2019 13* 20 - 50 % Final    Cholesterol 01/14/2019 184  120 - 199 mg/dL Final    Triglycerides 01/14/2019 169* 30 - 150 mg/dL Final    HDL 01/14/2019 41  40 - 75 mg/dL Final    LDL Cholesterol 01/14/2019 109.2  63.0 - 159.0 mg/dL Final    HDL/Chol Ratio 01/14/2019 22.3  20.0 - 50.0 % Final    Total Cholesterol/HDL Ratio 01/14/2019 4.5  2.0 - 5.0 Final    Non-HDL Cholesterol 01/14/2019 143  mg/dL Final    Magnesium 01/14/2019 2.3  1.6 - 2.6 mg/dL Final    Phosphorus 01/14/2019 2.9  2.7 - 4.5 mg/dL Final    T3, Total 01/14/2019 86  60 - 180 ng/dL Final    T4, Total 01/14/2019 8.9  4.5 - 11.5 ug/dL Final    TSH 01/14/2019 1.568  0.400 - 4.000 uIU/mL Final    Free T4 01/14/2019 1.03  0.71 - 1.51 ng/dL Final    Vitamin B-12 01/14/2019 583  210 - 950 pg/mL Final    Thiamine  01/14/2019 77  38 - 122 ug/L Final    Vit D, 25-Hydroxy 01/14/2019 40  30 - 96 ng/mL Final         Medications  Outpatient Encounter Medications as of 2/11/2019   Medication Sig Dispense Refill    aspirin (ECOTRIN) 81 MG EC tablet Take 81 mg by mouth once daily.      benzoyl peroxide-erythromycin (BENZAMYCIN) gel Apply to affected area every day at bedtime  2    blood sugar diagnostic Strp 1 each by Misc.(Non-Drug; Combo Route) route once daily. 100 each 3    blood-glucose meter kit Use as instructed. Please provide patient with cheapest per insurance. 1 each 0    buPROPion (WELLBUTRIN) 75 MG tablet Take 75 mg by mouth once daily.      calcium carbonate-vitamin D3 (CALCIUM 600 + D,3,) 600 mg calcium- 200 unit Cap Take by mouth.      DULoxetine (CYMBALTA) 30 MG capsule Take 30 mg by mouth once daily.      INV TESTOSTERONE/ANASTROZOL OR PLACEBO PELLETS Inject 1 Pellet into the skin. For investigational use only.      lamoTRIgine (LAMICTAL) 100 MG tablet Take 1 tablet (100 mg total) by mouth once daily. 90 tablet 0    levothyroxine (SYNTHROID) 125 MCG tablet Take 1 tablet (125 mcg total) by mouth once daily. 90 tablet 3    lisinopril (PRINIVIL,ZESTRIL) 5 MG tablet Take 5 mg by mouth once daily.      medical supply, miscellaneous (MISCELLANEOUS MEDICAL SUPPLY MISC) 150 mg by Misc.(Non-Drug; Combo Route) route.      metoprolol succinate 50 mg CSpX Take 50 mg by mouth once daily.       MULTIVIT &MINERALS/FERROUS FUM (MULTI VITAMIN ORAL) Take by mouth.      naltrexone (DEPADE) 50 mg tablet Take 1 tablet (50 mg total) by mouth once daily. 30 tablet 2    pantoprazole (PROTONIX) 40 MG tablet Take 40 mg by mouth once daily.      rosuvastatin (CRESTOR) 20 MG tablet Take 20 mg by mouth every evening.        No facility-administered encounter medications on file as of 2/11/2019.            Assessment - Diagnosis - Goals:     Unspecified Depressive Disorder  Unspecified Anxiety Disorder    Morbid obesity    Nicotine Dependence     Treatment Plan/Recommendations:       Medications:  Continue Bupropion SR 75 mg po q day- for depression and nicotine cessation  Continue Cymbalta 30 mg po q AM for depression/anxiety/chronic pain  Continue Lamictal 100 mg po q day for adjunctive depression/chronic pain  Continue naltrexone 50 mg po q day for weight loss/obesity    Discussed diagnosis, risks and benefits of proposed treatment vs alternative treatments vs no treatment, and potential side effects of these treatments.  The patient expresses understanding of the above and displays the capacity to agree with this treatment given said understanding.  Patient also agrees that, currently, the benefits outweigh the risks and would like to pursue treatment at this time.    We discussed risk of relapse with lower dosages- she was counseled on s/s of relapse. She and her  will be on guard fo rthem and resume previous dosages and call the clinic if symptoms return.     Therapy:   Not indicated at this time; will refer if/when needed    Counseling:  Counseled on diet and exercise for weight loss/obesity    Return to Clinic: 6 months, sooner if needed    Jey Og MD  Psychiatry

## 2019-02-18 ENCOUNTER — PATIENT MESSAGE (OUTPATIENT)
Dept: BARIATRICS | Facility: CLINIC | Age: 54
End: 2019-02-18

## 2019-02-18 RX ORDER — DULOXETIN HYDROCHLORIDE 30 MG/1
30 CAPSULE, DELAYED RELEASE ORAL DAILY
Qty: 30 CAPSULE | Refills: 5 | Status: ON HOLD | OUTPATIENT
Start: 2019-02-18 | End: 2019-03-28 | Stop reason: HOSPADM

## 2019-02-18 RX ORDER — BUPROPION HYDROCHLORIDE 100 MG/1
100 TABLET ORAL DAILY
Qty: 30 TABLET | Refills: 5 | Status: SHIPPED | OUTPATIENT
Start: 2019-02-18 | End: 2019-08-16 | Stop reason: SDUPTHER

## 2019-02-19 ENCOUNTER — PATIENT MESSAGE (OUTPATIENT)
Dept: BARIATRICS | Facility: CLINIC | Age: 54
End: 2019-02-19

## 2019-02-20 ENCOUNTER — PATIENT MESSAGE (OUTPATIENT)
Dept: BARIATRICS | Facility: CLINIC | Age: 54
End: 2019-02-20

## 2019-02-21 ENCOUNTER — LAB VISIT (OUTPATIENT)
Dept: LAB | Facility: HOSPITAL | Age: 54
End: 2019-02-21
Attending: PHYSICIAN ASSISTANT
Payer: COMMERCIAL

## 2019-02-21 DIAGNOSIS — Z72.0 TOBACCO ABUSE: ICD-10-CM

## 2019-02-21 PROCEDURE — 80323 ALKALOIDS NOS: CPT

## 2019-02-21 PROCEDURE — 36415 COLL VENOUS BLD VENIPUNCTURE: CPT

## 2019-02-25 LAB
COTININE SERPL-MCNC: <3 NG/ML
NICOTINE SERPL-MCNC: <3 NG/ML

## 2019-03-04 ENCOUNTER — TELEPHONE (OUTPATIENT)
Dept: BARIATRICS | Facility: CLINIC | Age: 54
End: 2019-03-04

## 2019-03-04 DIAGNOSIS — E11.69 TYPE 2 DIABETES MELLITUS WITH OTHER SPECIFIED COMPLICATION, WITH LONG-TERM CURRENT USE OF INSULIN: ICD-10-CM

## 2019-03-04 DIAGNOSIS — K21.9 GASTROESOPHAGEAL REFLUX DISEASE, ESOPHAGITIS PRESENCE NOT SPECIFIED: ICD-10-CM

## 2019-03-04 DIAGNOSIS — Z98.84 STATUS POST LAPAROSCOPIC SLEEVE GASTRECTOMY: ICD-10-CM

## 2019-03-04 DIAGNOSIS — Z79.4 TYPE 2 DIABETES MELLITUS WITH OTHER SPECIFIED COMPLICATION, WITH LONG-TERM CURRENT USE OF INSULIN: ICD-10-CM

## 2019-03-04 DIAGNOSIS — G47.33 OSA (OBSTRUCTIVE SLEEP APNEA): ICD-10-CM

## 2019-03-04 DIAGNOSIS — I10 HYPERTENSION, UNSPECIFIED TYPE: ICD-10-CM

## 2019-03-04 DIAGNOSIS — E66.01 MORBID OBESITY: Primary | ICD-10-CM

## 2019-03-04 NOTE — TELEPHONE ENCOUNTER
S/w pt that we can schedule her sx. Told pt dr. Danielle is not in on 3/25/19. I told her any Monday in April. Pt refused and wanted Dr. Coe. Sx scheduled for 3/27/19    1 wk liquid diet starts 3/20/19    pcp clearance aware    Post op appts made

## 2019-03-12 ENCOUNTER — PATIENT MESSAGE (OUTPATIENT)
Dept: PSYCHIATRY | Facility: CLINIC | Age: 54
End: 2019-03-12

## 2019-03-12 NOTE — TELEPHONE ENCOUNTER
Patient notified.     Patient is already on Wellbutrin (Crushable)   No other form of crushable for Cymbalta is available, patient can stop taking Cymbalta 30 mg for 2 weeks.     Patient voiced understanding.

## 2019-03-19 ENCOUNTER — DOCUMENTATION ONLY (OUTPATIENT)
Dept: BARIATRICS | Facility: CLINIC | Age: 54
End: 2019-03-19

## 2019-03-19 ENCOUNTER — OFFICE VISIT (OUTPATIENT)
Dept: BARIATRICS | Facility: CLINIC | Age: 54
End: 2019-03-19
Payer: COMMERCIAL

## 2019-03-19 VITALS
SYSTOLIC BLOOD PRESSURE: 126 MMHG | DIASTOLIC BLOOD PRESSURE: 66 MMHG | HEART RATE: 84 BPM | BODY MASS INDEX: 39.62 KG/M2 | HEIGHT: 67 IN | WEIGHT: 252.44 LBS

## 2019-03-19 DIAGNOSIS — E66.01 CLASS 2 SEVERE OBESITY DUE TO EXCESS CALORIES WITH SERIOUS COMORBIDITY AND BODY MASS INDEX (BMI) OF 39.0 TO 39.9 IN ADULT: Primary | ICD-10-CM

## 2019-03-19 PROCEDURE — 99214 OFFICE O/P EST MOD 30 MIN: CPT | Mod: S$GLB,,, | Performed by: SURGERY

## 2019-03-19 PROCEDURE — 99999 PR PBB SHADOW E&M-EST. PATIENT-LVL III: ICD-10-PCS | Mod: PBBFAC,,, | Performed by: SURGERY

## 2019-03-19 PROCEDURE — 99999 PR PBB SHADOW E&M-EST. PATIENT-LVL III: CPT | Mod: PBBFAC,,, | Performed by: SURGERY

## 2019-03-19 PROCEDURE — 99214 PR OFFICE/OUTPT VISIT, EST, LEVL IV, 30-39 MIN: ICD-10-PCS | Mod: S$GLB,,, | Performed by: SURGERY

## 2019-03-19 RX ORDER — METOPROLOL TARTRATE 25 MG/1
TABLET, FILM COATED ORAL
Refills: 11 | COMMUNITY
Start: 2019-03-13 | End: 2019-03-26 | Stop reason: DRUGHIGH

## 2019-03-19 RX ORDER — HEPARIN SODIUM 5000 [USP'U]/ML
5000 INJECTION, SOLUTION INTRAVENOUS; SUBCUTANEOUS ONCE
Status: CANCELLED | OUTPATIENT
Start: 2019-03-19 | End: 2019-03-19

## 2019-03-19 RX ORDER — ONDANSETRON 8 MG/1
8 TABLET, ORALLY DISINTEGRATING ORAL EVERY 6 HOURS PRN
Qty: 30 TABLET | Refills: 0 | Status: SHIPPED | OUTPATIENT
Start: 2019-03-19 | End: 2019-06-28

## 2019-03-19 RX ORDER — FAMOTIDINE 10 MG/ML
20 INJECTION INTRAVENOUS ONCE
Status: CANCELLED | OUTPATIENT
Start: 2019-03-19 | End: 2019-03-19

## 2019-03-19 RX ORDER — OMEPRAZOLE 40 MG/1
40 CAPSULE, DELAYED RELEASE ORAL EVERY MORNING
Qty: 30 CAPSULE | Refills: 2 | Status: SHIPPED | OUTPATIENT
Start: 2019-03-19 | End: 2019-04-10 | Stop reason: ALTCHOICE

## 2019-03-19 RX ORDER — SODIUM CITRATE AND CITRIC ACID MONOHYDRATE 334; 500 MG/5ML; MG/5ML
15 SOLUTION ORAL ONCE
Status: CANCELLED | OUTPATIENT
Start: 2019-03-19 | End: 2019-03-19

## 2019-03-19 RX ORDER — HYDROCODONE BITARTRATE AND ACETAMINOPHEN 7.5; 325 MG/15ML; MG/15ML
15 SOLUTION ORAL 4 TIMES DAILY PRN
Qty: 473 ML | Refills: 0 | Status: SHIPPED | OUTPATIENT
Start: 2019-03-19 | End: 2019-04-10

## 2019-03-19 RX ORDER — URSODIOL 500 MG/1
TABLET, FILM COATED ORAL
Qty: 90 TABLET | Refills: 1 | Status: SHIPPED | OUTPATIENT
Start: 2019-03-19 | End: 2019-04-10 | Stop reason: SDUPTHER

## 2019-03-19 RX ORDER — METOCLOPRAMIDE HYDROCHLORIDE 5 MG/ML
10 INJECTION INTRAMUSCULAR; INTRAVENOUS ONCE
Status: CANCELLED | OUTPATIENT
Start: 2019-03-19 | End: 2019-03-19

## 2019-03-19 NOTE — H&P (VIEW-ONLY)
History & Physical    SUBJECTIVE:     History of Present Illness:  Tiffany Hendrix is a 53 y.o. female who presents for pre-operative exam for weight loss surgery.  she has completed her pre-operative evaluation.  she has failed medical treatment for obesity.  1. Morbid Obesity with Body mass index is 37.95 kg/m². and inability to lose weight.  2. Co-morbidities: DM type 2 without insulin or complications, HLD, essential HTN, anxiety/depression, CAMRON requiring cpap but not currently on, GERD controlled on ppi        Chief Complaint   Patient presents with    Pre-op Exam       Review of patient's allergies indicates:   Allergen Reactions    Neuromuscular blockers, steroidal Itching    Iodinated contrast- oral and iv dye Hives    Morphine Itching    Tramadol     Adhesive Rash    Corticosteroids (glucocorticoids) Itching       Current Outpatient Medications   Medication Sig    benzoyl peroxide-erythromycin (BENZAMYCIN) gel Apply to affected area every day at bedtime    buPROPion (WELLBUTRIN) 100 MG tablet Take 1 tablet (100 mg total) by mouth once daily.    DULoxetine (CYMBALTA) 30 MG capsule Take 1 capsule (30 mg total) by mouth once daily.    INV TESTOSTERONE/ANASTROZOL OR PLACEBO PELLETS Inject 1 Pellet into the skin. For investigational use only.    lamoTRIgine (LAMICTAL) 100 MG tablet Take 1 tablet (100 mg total) by mouth once daily.    levothyroxine (SYNTHROID) 125 MCG tablet Take 1 tablet (125 mcg total) by mouth once daily.    lisinopril (PRINIVIL,ZESTRIL) 5 MG tablet Take 5 mg by mouth once daily.    metoprolol succinate 50 mg CSpX Take 50 mg by mouth once daily.     metoprolol tartrate (LOPRESSOR) 25 MG tablet TAKE 1 TABLET ORALLY 2 TIMES A DAY. (REPLACES TOPROL XL 50MG)    pantoprazole (PROTONIX) 40 MG tablet Take 40 mg by mouth once daily.    rosuvastatin (CRESTOR) 20 MG tablet Take 20 mg by mouth every evening.     aspirin (ECOTRIN) 81 MG EC tablet Take 81 mg by mouth once daily.  "    No current facility-administered medications for this visit.        Past Medical History:   Diagnosis Date    Acquired hypothyroidism 8/15/2018    Anemia     2016    Anxiety     Arthritis     Bipolar 1 disorder     Depression     Disorder of kidney and ureter     nephritis     Encounter for blood transfusion     2006, post spinal surgery     GERD (gastroesophageal reflux disease)     over 20 years    Hypertension     Hypothyroidism     Nontoxic multinodular goiter     Obesity     Pure hypercholesterolemia 8/15/2018    Type 2 diabetes mellitus without complication, without long-term current use of insulin 8/15/2018       Past Surgical History:   Procedure Laterality Date    BACK SURGERY       SECTION      x2    HYSTERECTOMY      KNEE SURGERY      THYROID SURGERY      WRIST SURGERY         Review of Systems   Constitutional: Negative for fever and malaise/fatigue.   HENT: Negative for congestion and sinus pain.    Respiratory: Negative for cough and shortness of breath.    Cardiovascular: Negative for chest pain and palpitations.   Gastrointestinal: Positive for heartburn. Negative for abdominal pain, constipation, diarrhea, nausea and vomiting.   Genitourinary: Negative for dysuria and frequency.   Musculoskeletal: Positive for back pain. Negative for neck pain.   Endo/Heme/Allergies: Does not bruise/bleed easily.          OBJECTIVE:     Vitals:    19 1633   BP: 126/66   Pulse: 84   Weight: 114.5 kg (252 lb 6.8 oz)   Height: 5' 7" (1.702 m)       Physical Exam   Constitutional: She is oriented to person, place, and time and well-developed, well-nourished, and in no distress.   Cardiovascular: Normal rate, regular rhythm and normal heart sounds.   Pulmonary/Chest: Effort normal and breath sounds normal.   Abdominal: Soft. Bowel sounds are normal. There is no tenderness.   Neurological: She is alert and oriented to person, place, and time.   Skin: Skin is warm and dry. "   Psychiatric: Mood, memory, affect and judgment normal.   Vitals reviewed.       Laboratory  CBC: Reviewed  CMP: Reviewed    Diagnostic Results:  ECG: Reviewed  X-Ray: Reviewed     Dietitian: Patient has participated in pre-operative nutritional program with understanding of necessary lifelong dietary changes required with surgery.    Psych: No overt contraindications to bariatric surgery, patient has completed psychological evaluation and is able to give informed consent.    Clearance: by me    ASSESSMENT/PLAN:     Morbid obesity with failure of medical conservative therapy.    Co Morbid Conditions:   Patient Active Problem List   Diagnosis    Nontoxic multinodular goiter    Class 2 severe obesity due to excess calories with serious comorbidity and body mass index (BMI) of 39.0 to 39.9 in adult    Depression    Back pain    Osteoarthritis    Proctitis    Flank pain    Acquired hypothyroidism    Type 2 diabetes mellitus without complication, without long-term current use of insulin    Pure hypercholesterolemia    Anxiety    Gastroesophageal reflux disease without esophagitis    Essential hypertension    CAMRON (obstructive sleep apnea)       Patient wishes to undergo sleeve gastrectomy.  Obtain egd and stress test result.    The patient was informed that risks include, but are not limited to: death, leak, obstruction, bleeding, and sepsis. Any of these could require further surgery. Other risks include DVT, PE, pneumonia, wound dehiscence, hernia, wound infection, the need for dilatations and the inability to lose appropriate weight and keep it off.     We discussed that our goal is to ameliorate her medical problems and not to obtain a specific body mass index. she understands the risks and benefits and wishes to proceed with the procedure.  she has signed a consent form.

## 2019-03-20 NOTE — PROGRESS NOTES
Reviewed with pt to start high protein liquid diet tomorrow for pre-op.    Pt will use protein shakes and has protein powder.      Reviewed post surgery will add protein powder mixed with water for days 1 and 2, by day 3 may try using skim, 1% milk or unsweetened almond/soy milk.  By Day 4, may use RTD protein shakes as tolerated    Pt has their post-op vitamins and minerals to start taking once discharged from hospital.  Reviewed dosage and timing of vitamin/mineral guidelines.    Reviewed nutritional guidelines for protein and fluid requirements for week 1 and week 2 post-surgery.  Handout provided to log protein and fluid daily.  1 ounce medicine cups provided for patient to measure fluid intake after surgery.    Reviewed common nutritional concerns and prevention tips after bariatric surgery    Pt verbalized understanding of information provided with appropriate questions and comments.

## 2019-03-26 ENCOUNTER — TELEPHONE (OUTPATIENT)
Dept: BARIATRICS | Facility: CLINIC | Age: 54
End: 2019-03-26

## 2019-03-26 NOTE — PRE-PROCEDURE INSTRUCTIONS
Preop instructions: NPO solids/ milk products after midnight and clears up to 2 hours before arrival (clear liquids are: water, apple juice, Gatorade & Jell-O, black coffee/no milk, cream or creamer), shower instructions, directions, leave all valuables at home, medication instructions for PM prior & am of procedure explained. Patient stated an understanding.     Patient denies any side effects or issues with anesthesia or sedation.     Patient does not know arrival time. Explained that this information comes from the surgeons office and if they have not heard from them by 3pm, to call office. Patient stated an understanding.    Verified patient received instructions to drink 8 ounces water 2 hours before surgery time

## 2019-03-26 NOTE — TELEPHONE ENCOUNTER
Notified patient of arrival time to the [facility] at 615 with expected surgery start time 815 on 3/27/19.   Instructed patient regarding pre-op instructions including npo status, showering and preop medications to hold/take per anesthesia/preop.  Instructed patient on the s/s of dehydration and for patient to call at the first sign of dehydration.  Informed patient that someone from bariatrics will be call them 1 week post op to review diet/fluid intake and to ensure adequate hydration.   Pt verbalized understanding. Pt given office phone number for any additional questions/concerns.

## 2019-03-27 ENCOUNTER — HOSPITAL ENCOUNTER (INPATIENT)
Facility: HOSPITAL | Age: 54
LOS: 1 days | Discharge: HOME | DRG: 621 | End: 2019-03-28
Attending: SURGERY | Admitting: SURGERY
Payer: COMMERCIAL

## 2019-03-27 ENCOUNTER — ANESTHESIA (OUTPATIENT)
Dept: SURGERY | Facility: HOSPITAL | Age: 54
DRG: 621 | End: 2019-03-27
Payer: COMMERCIAL

## 2019-03-27 ENCOUNTER — ANESTHESIA EVENT (OUTPATIENT)
Dept: SURGERY | Facility: HOSPITAL | Age: 54
DRG: 621 | End: 2019-03-27
Payer: COMMERCIAL

## 2019-03-27 DIAGNOSIS — E66.01 CLASS 2 SEVERE OBESITY DUE TO EXCESS CALORIES WITH SERIOUS COMORBIDITY AND BODY MASS INDEX (BMI) OF 39.0 TO 39.9 IN ADULT: Primary | ICD-10-CM

## 2019-03-27 LAB — POCT GLUCOSE: 77 MG/DL (ref 70–110)

## 2019-03-27 PROCEDURE — 63600175 PHARM REV CODE 636 W HCPCS: Performed by: SURGERY

## 2019-03-27 PROCEDURE — 27000221 HC OXYGEN, UP TO 24 HOURS

## 2019-03-27 PROCEDURE — 63600175 PHARM REV CODE 636 W HCPCS: Performed by: ANESTHESIOLOGY

## 2019-03-27 PROCEDURE — 25000003 PHARM REV CODE 250: Performed by: SURGERY

## 2019-03-27 PROCEDURE — 63600175 PHARM REV CODE 636 W HCPCS: Performed by: UROLOGY

## 2019-03-27 PROCEDURE — C9113 INJ PANTOPRAZOLE SODIUM, VIA: HCPCS | Performed by: STUDENT IN AN ORGANIZED HEALTH CARE EDUCATION/TRAINING PROGRAM

## 2019-03-27 PROCEDURE — 63600175 PHARM REV CODE 636 W HCPCS: Performed by: STUDENT IN AN ORGANIZED HEALTH CARE EDUCATION/TRAINING PROGRAM

## 2019-03-27 PROCEDURE — 37000008 HC ANESTHESIA 1ST 15 MINUTES: Performed by: SURGERY

## 2019-03-27 PROCEDURE — 63600175 PHARM REV CODE 636 W HCPCS

## 2019-03-27 PROCEDURE — 82962 GLUCOSE BLOOD TEST: CPT | Performed by: SURGERY

## 2019-03-27 PROCEDURE — 71000033 HC RECOVERY, INTIAL HOUR: Performed by: SURGERY

## 2019-03-27 PROCEDURE — S0028 INJECTION, FAMOTIDINE, 20 MG: HCPCS | Performed by: UROLOGY

## 2019-03-27 PROCEDURE — 88307 TISSUE EXAM BY PATHOLOGIST: CPT | Mod: 26,,, | Performed by: PATHOLOGY

## 2019-03-27 PROCEDURE — 43775 LAP SLEEVE GASTRECTOMY: CPT | Mod: ,,, | Performed by: SURGERY

## 2019-03-27 PROCEDURE — 27201423 OPTIME MED/SURG SUP & DEVICES STERILE SUPPLY: Performed by: SURGERY

## 2019-03-27 PROCEDURE — 25000003 PHARM REV CODE 250: Performed by: STUDENT IN AN ORGANIZED HEALTH CARE EDUCATION/TRAINING PROGRAM

## 2019-03-27 PROCEDURE — G0378 HOSPITAL OBSERVATION PER HR: HCPCS

## 2019-03-27 PROCEDURE — 11000001 HC ACUTE MED/SURG PRIVATE ROOM

## 2019-03-27 PROCEDURE — 71000039 HC RECOVERY, EACH ADD'L HOUR: Performed by: SURGERY

## 2019-03-27 PROCEDURE — 25000003 PHARM REV CODE 250: Performed by: UROLOGY

## 2019-03-27 PROCEDURE — 36000710: Performed by: SURGERY

## 2019-03-27 PROCEDURE — 43775 PR LAP, GAST RESTRICT PROC, LONGITUDINAL GASTRECTOMY: ICD-10-PCS | Mod: ,,, | Performed by: SURGERY

## 2019-03-27 PROCEDURE — 88307 TISSUE EXAM BY PATHOLOGIST: CPT | Performed by: PATHOLOGY

## 2019-03-27 PROCEDURE — 94761 N-INVAS EAR/PLS OXIMETRY MLT: CPT

## 2019-03-27 PROCEDURE — S0028 INJECTION, FAMOTIDINE, 20 MG: HCPCS | Performed by: SURGERY

## 2019-03-27 PROCEDURE — 88307 TISSUE SPECIMEN TO PATHOLOGY - SURGERY: ICD-10-PCS | Mod: 26,,, | Performed by: PATHOLOGY

## 2019-03-27 PROCEDURE — D9220A PRA ANESTHESIA: Mod: ,,, | Performed by: ANESTHESIOLOGY

## 2019-03-27 PROCEDURE — 37000009 HC ANESTHESIA EA ADD 15 MINS: Performed by: SURGERY

## 2019-03-27 PROCEDURE — D9220A PRA ANESTHESIA: ICD-10-PCS | Mod: ,,, | Performed by: ANESTHESIOLOGY

## 2019-03-27 PROCEDURE — 36000711: Performed by: SURGERY

## 2019-03-27 RX ORDER — GLYCOPYRROLATE 0.2 MG/ML
INJECTION INTRAMUSCULAR; INTRAVENOUS
Status: DISCONTINUED | OUTPATIENT
Start: 2019-03-27 | End: 2019-03-27

## 2019-03-27 RX ORDER — DULOXETIN HYDROCHLORIDE 30 MG/1
30 CAPSULE, DELAYED RELEASE ORAL DAILY
Status: DISCONTINUED | OUTPATIENT
Start: 2019-03-28 | End: 2019-03-28 | Stop reason: HOSPADM

## 2019-03-27 RX ORDER — ACETAMINOPHEN 10 MG/ML
1000 INJECTION, SOLUTION INTRAVENOUS ONCE
Status: COMPLETED | OUTPATIENT
Start: 2019-03-27 | End: 2019-03-27

## 2019-03-27 RX ORDER — BUPIVACAINE HYDROCHLORIDE 2.5 MG/ML
INJECTION, SOLUTION EPIDURAL; INFILTRATION; INTRACAUDAL
Status: DISCONTINUED | OUTPATIENT
Start: 2019-03-27 | End: 2019-03-27 | Stop reason: HOSPADM

## 2019-03-27 RX ORDER — ONDANSETRON 2 MG/ML
INJECTION INTRAMUSCULAR; INTRAVENOUS
Status: DISCONTINUED | OUTPATIENT
Start: 2019-03-27 | End: 2019-03-27

## 2019-03-27 RX ORDER — NALOXONE HCL 0.4 MG/ML
0.02 VIAL (ML) INJECTION
Status: DISCONTINUED | OUTPATIENT
Start: 2019-03-27 | End: 2019-03-28 | Stop reason: HOSPADM

## 2019-03-27 RX ORDER — FAMOTIDINE 10 MG/ML
INJECTION INTRAVENOUS
Status: DISCONTINUED | OUTPATIENT
Start: 2019-03-27 | End: 2019-03-27

## 2019-03-27 RX ORDER — ONDANSETRON 2 MG/ML
4 INJECTION INTRAMUSCULAR; INTRAVENOUS EVERY 8 HOURS
Status: DISCONTINUED | OUTPATIENT
Start: 2019-03-27 | End: 2019-03-28 | Stop reason: HOSPADM

## 2019-03-27 RX ORDER — FAMOTIDINE 10 MG/ML
20 INJECTION INTRAVENOUS ONCE
Status: COMPLETED | OUTPATIENT
Start: 2019-03-27 | End: 2019-03-27

## 2019-03-27 RX ORDER — HEPARIN SODIUM 5000 [USP'U]/ML
5000 INJECTION, SOLUTION INTRAVENOUS; SUBCUTANEOUS ONCE
Status: COMPLETED | OUTPATIENT
Start: 2019-03-27 | End: 2019-03-27

## 2019-03-27 RX ORDER — ROCURONIUM BROMIDE 10 MG/ML
INJECTION, SOLUTION INTRAVENOUS
Status: DISCONTINUED | OUTPATIENT
Start: 2019-03-27 | End: 2019-03-27

## 2019-03-27 RX ORDER — LIDOCAINE HYDROCHLORIDE 10 MG/ML
1 INJECTION, SOLUTION EPIDURAL; INFILTRATION; INTRACAUDAL; PERINEURAL ONCE
Status: DISCONTINUED | OUTPATIENT
Start: 2019-03-27 | End: 2019-03-27

## 2019-03-27 RX ORDER — SODIUM CHLORIDE 9 MG/ML
INJECTION, SOLUTION INTRAVENOUS CONTINUOUS PRN
Status: DISCONTINUED | OUTPATIENT
Start: 2019-03-27 | End: 2019-03-27

## 2019-03-27 RX ORDER — HYDROMORPHONE HCL IN 0.9% NACL 6 MG/30 ML
PATIENT CONTROLLED ANALGESIA SYRINGE INTRAVENOUS CONTINUOUS
Status: DISCONTINUED | OUTPATIENT
Start: 2019-03-27 | End: 2019-03-28

## 2019-03-27 RX ORDER — NEOSTIGMINE METHYLSULFATE 1 MG/ML
INJECTION, SOLUTION INTRAVENOUS
Status: DISCONTINUED | OUTPATIENT
Start: 2019-03-27 | End: 2019-03-27

## 2019-03-27 RX ORDER — METOCLOPRAMIDE HYDROCHLORIDE 5 MG/ML
10 INJECTION INTRAMUSCULAR; INTRAVENOUS EVERY 10 MIN PRN
Status: DISCONTINUED | OUTPATIENT
Start: 2019-03-27 | End: 2019-03-27 | Stop reason: HOSPADM

## 2019-03-27 RX ORDER — FENTANYL CITRATE 50 UG/ML
25 INJECTION, SOLUTION INTRAMUSCULAR; INTRAVENOUS EVERY 5 MIN PRN
Status: DISCONTINUED | OUTPATIENT
Start: 2019-03-27 | End: 2019-03-27

## 2019-03-27 RX ORDER — ACETAMINOPHEN 10 MG/ML
INJECTION, SOLUTION INTRAVENOUS
Status: DISCONTINUED
Start: 2019-03-27 | End: 2019-03-27 | Stop reason: WASHOUT

## 2019-03-27 RX ORDER — ENOXAPARIN SODIUM 100 MG/ML
40 INJECTION SUBCUTANEOUS EVERY 24 HOURS
Status: DISCONTINUED | OUTPATIENT
Start: 2019-03-27 | End: 2019-03-28 | Stop reason: HOSPADM

## 2019-03-27 RX ORDER — SODIUM CHLORIDE 9 MG/ML
INJECTION, SOLUTION INTRAVENOUS CONTINUOUS
Status: DISCONTINUED | OUTPATIENT
Start: 2019-03-27 | End: 2019-03-28

## 2019-03-27 RX ORDER — ONDANSETRON 2 MG/ML
4 INJECTION INTRAMUSCULAR; INTRAVENOUS EVERY 6 HOURS PRN
Status: DISCONTINUED | OUTPATIENT
Start: 2019-03-27 | End: 2019-03-28 | Stop reason: HOSPADM

## 2019-03-27 RX ORDER — PHENYLEPHRINE HYDROCHLORIDE 10 MG/ML
INJECTION INTRAVENOUS
Status: DISCONTINUED | OUTPATIENT
Start: 2019-03-27 | End: 2019-03-27

## 2019-03-27 RX ORDER — LIDOCAINE HCL/PF 100 MG/5ML
SYRINGE (ML) INTRAVENOUS
Status: DISCONTINUED | OUTPATIENT
Start: 2019-03-27 | End: 2019-03-27

## 2019-03-27 RX ORDER — SODIUM CHLORIDE 0.9 % (FLUSH) 0.9 %
3 SYRINGE (ML) INJECTION
Status: DISCONTINUED | OUTPATIENT
Start: 2019-03-27 | End: 2019-03-27

## 2019-03-27 RX ORDER — PROPOFOL 10 MG/ML
VIAL (ML) INTRAVENOUS
Status: DISCONTINUED | OUTPATIENT
Start: 2019-03-27 | End: 2019-03-27

## 2019-03-27 RX ORDER — KETAMINE HYDROCHLORIDE 10 MG/ML
INJECTION, SOLUTION INTRAMUSCULAR; INTRAVENOUS
Status: DISCONTINUED | OUTPATIENT
Start: 2019-03-27 | End: 2019-03-27

## 2019-03-27 RX ORDER — ACETAMINOPHEN 10 MG/ML
INJECTION, SOLUTION INTRAVENOUS
Status: DISCONTINUED | OUTPATIENT
Start: 2019-03-27 | End: 2019-03-27

## 2019-03-27 RX ORDER — LORAZEPAM 2 MG/ML
0.25 INJECTION INTRAMUSCULAR ONCE AS NEEDED
Status: DISCONTINUED | OUTPATIENT
Start: 2019-03-27 | End: 2019-03-27 | Stop reason: HOSPADM

## 2019-03-27 RX ORDER — FAMOTIDINE 10 MG/ML
20 INJECTION INTRAVENOUS ONCE
Status: DISCONTINUED | OUTPATIENT
Start: 2019-03-27 | End: 2019-03-27

## 2019-03-27 RX ORDER — PANTOPRAZOLE SODIUM 40 MG/10ML
40 INJECTION, POWDER, LYOPHILIZED, FOR SOLUTION INTRAVENOUS 2 TIMES DAILY
Status: DISCONTINUED | OUTPATIENT
Start: 2019-03-27 | End: 2019-03-28 | Stop reason: HOSPADM

## 2019-03-27 RX ORDER — HYDROCODONE BITARTRATE AND ACETAMINOPHEN 7.5; 325 MG/15ML; MG/15ML
15 SOLUTION ORAL EVERY 4 HOURS PRN
Status: DISCONTINUED | OUTPATIENT
Start: 2019-03-28 | End: 2019-03-28 | Stop reason: HOSPADM

## 2019-03-27 RX ORDER — LAMOTRIGINE 100 MG/1
100 TABLET ORAL DAILY
Status: DISCONTINUED | OUTPATIENT
Start: 2019-03-28 | End: 2019-03-28 | Stop reason: HOSPADM

## 2019-03-27 RX ORDER — HYDROMORPHONE HCL IN 0.9% NACL 6 MG/30 ML
PATIENT CONTROLLED ANALGESIA SYRINGE INTRAVENOUS
Status: COMPLETED
Start: 2019-03-27 | End: 2019-03-27

## 2019-03-27 RX ORDER — BUPROPION HYDROCHLORIDE 100 MG/1
100 TABLET ORAL DAILY
Status: DISCONTINUED | OUTPATIENT
Start: 2019-03-28 | End: 2019-03-28 | Stop reason: HOSPADM

## 2019-03-27 RX ORDER — MEPERIDINE HYDROCHLORIDE 50 MG/ML
12.5 INJECTION INTRAMUSCULAR; INTRAVENOUS; SUBCUTANEOUS ONCE
Status: DISCONTINUED | OUTPATIENT
Start: 2019-03-27 | End: 2019-03-27 | Stop reason: HOSPADM

## 2019-03-27 RX ORDER — KETOROLAC TROMETHAMINE 30 MG/ML
INJECTION, SOLUTION INTRAMUSCULAR; INTRAVENOUS
Status: DISCONTINUED | OUTPATIENT
Start: 2019-03-27 | End: 2019-03-27

## 2019-03-27 RX ORDER — ONDANSETRON 2 MG/ML
4 INJECTION INTRAMUSCULAR; INTRAVENOUS DAILY PRN
Status: DISCONTINUED | OUTPATIENT
Start: 2019-03-27 | End: 2019-03-27 | Stop reason: HOSPADM

## 2019-03-27 RX ORDER — MIDAZOLAM HYDROCHLORIDE 1 MG/ML
INJECTION, SOLUTION INTRAMUSCULAR; INTRAVENOUS
Status: DISCONTINUED | OUTPATIENT
Start: 2019-03-27 | End: 2019-03-27

## 2019-03-27 RX ORDER — DIPHENHYDRAMINE HYDROCHLORIDE 50 MG/ML
12.5 INJECTION INTRAMUSCULAR; INTRAVENOUS EVERY 4 HOURS PRN
Status: DISCONTINUED | OUTPATIENT
Start: 2019-03-27 | End: 2019-03-28

## 2019-03-27 RX ORDER — SUCCINYLCHOLINE CHLORIDE 20 MG/ML
INJECTION INTRAMUSCULAR; INTRAVENOUS
Status: DISCONTINUED | OUTPATIENT
Start: 2019-03-27 | End: 2019-03-27

## 2019-03-27 RX ORDER — FENTANYL CITRATE 50 UG/ML
25 INJECTION, SOLUTION INTRAMUSCULAR; INTRAVENOUS EVERY 5 MIN PRN
Status: COMPLETED | OUTPATIENT
Start: 2019-03-27 | End: 2019-03-27

## 2019-03-27 RX ORDER — METOPROLOL TARTRATE 25 MG/1
25 TABLET, FILM COATED ORAL 2 TIMES DAILY
Status: DISCONTINUED | OUTPATIENT
Start: 2019-03-28 | End: 2019-03-28 | Stop reason: HOSPADM

## 2019-03-27 RX ORDER — ROSUVASTATIN CALCIUM 10 MG/1
20 TABLET, COATED ORAL NIGHTLY
Status: DISCONTINUED | OUTPATIENT
Start: 2019-03-28 | End: 2019-03-28 | Stop reason: HOSPADM

## 2019-03-27 RX ORDER — METOCLOPRAMIDE HYDROCHLORIDE 5 MG/ML
10 INJECTION INTRAMUSCULAR; INTRAVENOUS ONCE
Status: COMPLETED | OUTPATIENT
Start: 2019-03-27 | End: 2019-03-27

## 2019-03-27 RX ORDER — SODIUM CITRATE AND CITRIC ACID MONOHYDRATE 334; 500 MG/5ML; MG/5ML
15 SOLUTION ORAL ONCE
Status: COMPLETED | OUTPATIENT
Start: 2019-03-27 | End: 2019-03-27

## 2019-03-27 RX ADMIN — SODIUM CHLORIDE: 0.9 INJECTION, SOLUTION INTRAVENOUS at 08:03

## 2019-03-27 RX ADMIN — NEOSTIGMINE METHYLSULFATE 5 MG: 1 INJECTION INTRAVENOUS at 09:03

## 2019-03-27 RX ADMIN — KETOROLAC TROMETHAMINE 30 MG: 30 INJECTION, SOLUTION INTRAMUSCULAR; INTRAVENOUS at 09:03

## 2019-03-27 RX ADMIN — KETAMINE HYDROCHLORIDE 35 MG: 10 INJECTION, SOLUTION INTRAMUSCULAR; INTRAVENOUS at 08:03

## 2019-03-27 RX ADMIN — DEXTROSE 3 G: 50 INJECTION, SOLUTION INTRAVENOUS at 08:03

## 2019-03-27 RX ADMIN — METOCLOPRAMIDE 10 MG: 5 INJECTION, SOLUTION INTRAMUSCULAR; INTRAVENOUS at 07:03

## 2019-03-27 RX ADMIN — PHENYLEPHRINE HYDROCHLORIDE 100 MCG: 10 INJECTION INTRAVENOUS at 08:03

## 2019-03-27 RX ADMIN — FAMOTIDINE 20 MG: 10 INJECTION, SOLUTION INTRAVENOUS at 09:03

## 2019-03-27 RX ADMIN — FENTANYL CITRATE 25 MCG: 50 INJECTION INTRAMUSCULAR; INTRAVENOUS at 10:03

## 2019-03-27 RX ADMIN — ONDANSETRON 4 MG: 2 INJECTION INTRAMUSCULAR; INTRAVENOUS at 03:03

## 2019-03-27 RX ADMIN — PANTOPRAZOLE SODIUM 40 MG: 40 INJECTION, POWDER, FOR SOLUTION INTRAVENOUS at 09:03

## 2019-03-27 RX ADMIN — ONDANSETRON 4 MG: 2 INJECTION INTRAMUSCULAR; INTRAVENOUS at 09:03

## 2019-03-27 RX ADMIN — Medication: at 10:03

## 2019-03-27 RX ADMIN — ROCURONIUM BROMIDE 20 MG: 10 INJECTION, SOLUTION INTRAVENOUS at 08:03

## 2019-03-27 RX ADMIN — LIDOCAINE HYDROCHLORIDE 100 MG: 20 INJECTION, SOLUTION INTRAVENOUS at 08:03

## 2019-03-27 RX ADMIN — PANTOPRAZOLE SODIUM 40 MG: 40 INJECTION, POWDER, FOR SOLUTION INTRAVENOUS at 10:03

## 2019-03-27 RX ADMIN — ACETAMINOPHEN 1000 MG: 10 INJECTION, SOLUTION INTRAVENOUS at 08:03

## 2019-03-27 RX ADMIN — MIDAZOLAM HYDROCHLORIDE 2 MG: 1 INJECTION, SOLUTION INTRAMUSCULAR; INTRAVENOUS at 08:03

## 2019-03-27 RX ADMIN — SODIUM CHLORIDE, SODIUM GLUCONATE, SODIUM ACETATE, POTASSIUM CHLORIDE, MAGNESIUM CHLORIDE, SODIUM PHOSPHATE, DIBASIC, AND POTASSIUM PHOSPHATE: .53; .5; .37; .037; .03; .012; .00082 INJECTION, SOLUTION INTRAVENOUS at 08:03

## 2019-03-27 RX ADMIN — HEPARIN SODIUM 5000 UNITS: 5000 INJECTION, SOLUTION INTRAVENOUS; SUBCUTANEOUS at 07:03

## 2019-03-27 RX ADMIN — FAMOTIDINE 20 MG: 10 INJECTION, SOLUTION INTRAVENOUS at 07:03

## 2019-03-27 RX ADMIN — SUCCINYLCHOLINE CHLORIDE 200 MG: 20 INJECTION, SOLUTION INTRAMUSCULAR; INTRAVENOUS at 08:03

## 2019-03-27 RX ADMIN — ROCURONIUM BROMIDE 5 MG: 10 INJECTION, SOLUTION INTRAVENOUS at 08:03

## 2019-03-27 RX ADMIN — Medication: at 04:03

## 2019-03-27 RX ADMIN — PHENYLEPHRINE HYDROCHLORIDE 50 MCG: 10 INJECTION INTRAVENOUS at 08:03

## 2019-03-27 RX ADMIN — SODIUM CHLORIDE: 0.9 INJECTION, SOLUTION INTRAVENOUS at 10:03

## 2019-03-27 RX ADMIN — SODIUM CITRATE AND CITRIC ACID MONOHYDRATE 30 ML: 500; 334 SOLUTION ORAL at 07:03

## 2019-03-27 RX ADMIN — SODIUM CHLORIDE: 0.9 INJECTION, SOLUTION INTRAVENOUS at 04:03

## 2019-03-27 RX ADMIN — PROPOFOL 300 MG: 10 INJECTION, EMULSION INTRAVENOUS at 08:03

## 2019-03-27 RX ADMIN — ENOXAPARIN SODIUM 40 MG: 100 INJECTION SUBCUTANEOUS at 04:03

## 2019-03-27 RX ADMIN — GLYCOPYRROLATE 0.4 MG: 0.2 INJECTION, SOLUTION INTRAMUSCULAR; INTRAVENOUS at 09:03

## 2019-03-27 RX ADMIN — ACETAMINOPHEN 1000 MG: 10 INJECTION, SOLUTION INTRAVENOUS at 03:03

## 2019-03-27 NOTE — ANESTHESIA PREPROCEDURE EVALUATION
03/27/2019  Tiffany Hendrix is a 53 y.o., female with a pre-operative diagnosis of Morbid obesity [E66.01]  CAMRON (obstructive sleep apnea) [G47.33]  Gastroesophageal reflux disease, esophagitis presence not specified [K21.9]  Type 2 diabetes mellitus with other specified complication, with long-term current use of insulin [E11.69, Z79.4]  Hypertension, unspecified type [I10] who is scheduled for Procedure(s) (LRB):  GASTRECTOMY, SLEEVE, LAPAROSCOPIC (N/A).     Requested anesthesia type: General  Surgeon: Jaime Coe MD  Allergies:   Review of patient's allergies indicates:   Allergen Reactions    Iodinated contrast- oral and iv dye Hives    Tramadol      unknown    Adhesive Rash     Use PAPER Tape/ Tegaderm    Corticosteroids (glucocorticoids) Itching    Morphine Itching    Neuromuscular blockers, steroidal Itching     Vital Sign Range:    Chronic Medications:   Medications Prior to Admission   Medication Sig Dispense Refill Last Dose    buPROPion (WELLBUTRIN) 100 MG tablet Take 1 tablet (100 mg total) by mouth once daily. 30 tablet 5 3/26/2019 at Unknown time    DULoxetine (CYMBALTA) 30 MG capsule Take 1 capsule (30 mg total) by mouth once daily. 30 capsule 5 3/26/2019 at Unknown time    lamoTRIgine (LAMICTAL) 100 MG tablet Take 1 tablet (100 mg total) by mouth once daily. 90 tablet 0 3/26/2019 at Unknown time    levothyroxine (SYNTHROID) 125 MCG tablet Take 1 tablet (125 mcg total) by mouth once daily. 90 tablet 3 3/26/2019 at Unknown time    lisinopril (PRINIVIL,ZESTRIL) 5 MG tablet Take 5 mg by mouth once daily.   3/26/2019 at Unknown time    metoprolol succinate 50 mg CSpX Take 50 mg by mouth once daily.    3/26/2019 at Unknown time    omeprazole (PRILOSEC) 40 MG capsule Take 1 capsule (40 mg total) by mouth every morning. Open capsule and take with apple sauce 30 capsule 2  3/26/2019 at Unknown time    pantoprazole (PROTONIX) 40 MG tablet Take 40 mg by mouth once daily.   3/26/2019 at Unknown time    rosuvastatin (CRESTOR) 20 MG tablet Take 20 mg by mouth every evening.    3/25/2019 at Unknown time    aspirin (ECOTRIN) 81 MG EC tablet Take 81 mg by mouth once daily.   Not Taking    benzoyl peroxide-erythromycin (BENZAMYCIN) gel Apply to affected area every day at bedtime  2 Taking    hydrocodone-acetaminophen (HYCET) solution 7.5-325 mg/15mL Take 15 mLs by mouth 4 (four) times daily as needed for Pain. 473 mL 0     INV TESTOSTERONE/ANASTROZOL OR PLACEBO PELLETS Inject 1 Pellet into the skin. For investigational use only.   Taking    ondansetron (ZOFRAN-ODT) 8 MG TbDL Take 1 tablet (8 mg total) by mouth every 6 (six) hours as needed. 30 tablet 0     ursodiol (LETICIA FORTE) 500 MG tablet Crush one tablet daily for gall bladder. 90 tablet 1      Current Medications:   No current facility-administered medications for this encounter.      Medical History:   Past Medical History:   Diagnosis Date    Acquired hypothyroidism 8/15/2018    Anemia     2016    Anxiety     Arthritis     Bipolar 1 disorder     Depression     Disorder of kidney and ureter     nephritis     Encounter for blood transfusion     2006, post spinal surgery     GERD (gastroesophageal reflux disease)     over 20 years    Hypertension     Hypothyroidism     Nontoxic multinodular goiter     Obesity     Pure hypercholesterolemia 8/15/2018    Type 2 diabetes mellitus without complication, without long-term current use of insulin 8/15/2018     .    Anesthesia Evaluation    I have reviewed the Patient Summary Reports.    I have reviewed the Nursing Notes.   I have reviewed the Medications.     Review of Systems  Anesthesia Hx:  No problems with previous Anesthesia  History of prior surgery of interest to airway management or planning: Previous anesthesia: General, Spinal, MAC Denies Family Hx of Anesthesia  complications.   Denies Personal Hx of Anesthesia complications.   Cardiovascular:   Hypertension, well controlled    Pulmonary:   Sleep Apnea    Renal/:   Chronic Renal Disease    Hepatic/GI:   GERD, well controlled    Musculoskeletal:   Arthritis     Endocrine:   Diabetes, well controlled, type 2 Hypothyroidism  Diabetes    Psych:   Psychiatric History          Physical Exam  General:  Morbid Obesity    Airway/Jaw/Neck:  Airway Findings: Mouth Opening: Normal Tongue: Normal  General Airway Assessment: Adult, Good  Mallampati: II  Improves to II with phonation.  TM Distance: Normal, at least 6 cm  Jaw/Neck Findings:     Neck ROM: Normal ROM      Dental:  Dental Findings: In tact   Chest/Lungs:  Chest/Lungs Findings: Clear to auscultation, Normal Respiratory Rate, Decreased Breath Sounds Bilateral     Heart/Vascular:  Heart Findings: Rate: Normal  Rhythm: Regular Rhythm  Sounds: Normal     Abdomen:  Abdomen Findings:  Normal, Soft, Nontender       Mental Status:  Mental Status Findings:  Cooperative, Alert and Oriented         Anesthesia Plan  Type of Anesthesia, risks & benefits discussed:  Anesthesia Type:  general  Patient's Preference: as indicated  Intra-op Monitoring Plan: standard ASA monitors  Intra-op Monitoring Plan Comments:   Post Op Pain Control Plan: multimodal analgesia, IV/PO Opioids PRN and per primary service following discharge from PACU  Post Op Pain Control Plan Comments:   Induction:   IV  Beta Blocker:  Patient is on a Beta-Blocker and has received one dose within the past 24 hours (No further documentation required).       Informed Consent: Patient understands risks and agrees with Anesthesia plan.  Questions answered. Anesthesia consent signed with patient.  ASA Score: 3     Day of Surgery Review of History & Physical:  There are no significant changes.  H&P update referred to the provider.     Anesthesia Plan Notes: Metoprolol taken this AM  Risks of dental and eye injury reviewed with  patient, agrees to proceed. Reassurance given.        Ready For Surgery From Anesthesia Perspective.

## 2019-03-27 NOTE — ANESTHESIA POSTPROCEDURE EVALUATION
Anesthesia Post Evaluation    Patient: Tiffany Hendrix    Procedure(s) Performed: Procedure(s) (LRB):  GASTRECTOMY, SLEEVE, LAPAROSCOPIC (N/A)    Final Anesthesia Type: general  Patient location during evaluation: PACU  Patient participation: Yes- Able to Participate  Level of consciousness: awake and alert and oriented  Post-procedure vital signs: reviewed and stable  Pain management: adequate  Airway patency: patent  PONV status at discharge: No PONV  Anesthetic complications: no      Cardiovascular status: stable  Respiratory status: unassisted, spontaneous ventilation and room air  Hydration status: euvolemic  Follow-up not needed.          Vitals Value Taken Time   /57 3/27/2019 11:29 AM   Temp 36.1 °C (96.9 °F) 3/27/2019 11:29 AM   Pulse 76 3/27/2019 11:29 AM   Resp 15 3/27/2019 11:10 AM   SpO2 94 % 3/27/2019 11:29 AM   Vitals shown include unvalidated device data.      Event Time     Out of Recovery 11:16:00          Pain/Karla Score: Pain Rating Prior to Med Admin: 10 (3/27/2019 10:41 AM)  Karla Score: 9 (3/27/2019 10:58 AM)

## 2019-03-27 NOTE — OP NOTE
DATE OF PROCEDURE: 3/27/2019    PRE OP DIAGNOSIS: Morbid obesity [E66.01]  CAMRON (obstructive sleep apnea) [G47.33]  Gastroesophageal reflux disease, esophagitis presence not specified [K21.9]  Type 2 diabetes mellitus with other specified complication, with long-term current use of insulin [E11.69, Z79.4]  Hypertension, unspecified type [I10]    POST OP DIAGNOSIS: Morbid obesity [E66.01]  CAMRON (obstructive sleep apnea) [G47.33]  Gastroesophageal reflux disease, esophagitis presence not specified [K21.9]  Type 2 diabetes mellitus with other specified complication, with long-term current use of insulin [E11.69, Z79.4]  Hypertension, unspecified type [I10]    PROCEDURE: Procedure(s) (LRB):  GASTRECTOMY, SLEEVE, LAPAROSCOPIC (N/A)    Surgeon(s) and Role:     * Jaime Coe MD - Primary     * Norman Lemus MD - Resident - AssistingANESTHESIA: General.   INDICATIONS: A 53 y.o. with 1. Morbid Obesity with Body mass index is 37.95 kg/m². and inability to lose weight.  2. Co-morbidities: DM type 2 without insulin or complications, HLD, essential HTN, anxiety/depression, CAMRON requiring cpap but not currently on, GERD controlled on ppi  DESCRIPTION OF PROCEDURE: The patient was placed under general anesthesia. The   abdomen was prepped and draped in usual manner. Access to peritoneum was   gained through the umbilicus using Optiview trocar under direct vision.   Pneumoperitoneum to 15 mmHg with CO2 gas was obtained. Four 5 mm trocars were   placed medially, subcostally at the midclavicular and anterior axial lines   bilaterally. A 10 mm trocar was placed 1 handbreadth caudad to the right   midclavicular trocar. The liver retractor was placed.   The greater curve was   taken down starting 6 cm from the pylorus going all the way to the base of the   left cesar taking all posterior gastric attachments with the Harmonic scalpel. A   42-[place]oz[place] bougie was passed towards the pylorus and the stomach was resected  along the  bougie starting 6 cm from the pylorus and coming out just a   little bit at the angle of His. The staple line was oversewn with a running   Quill stitch. The bougie was removed. Endoscopy was   performed. The sleeve appeared appropriate size and configuration and there   were no air leaks seen. The air was aspirated from the endoscope and the   endoscope was withdrawn. Iliana was placed over the surgical areas. The liver retractor was removed. The gastrectomy was   removed through the primary trochar site. That incision was then closed with 0 Vicryl. The trocars removed under direct vision. Prior to   removing the last trocar, the pneumoperitoneum was allowed to escape. The skin   incisions were infiltrated with Marcaine solution, closed with 4-0 plain catgut,   and reinforced with Mastisol, Steri-Strips, and Band-Aids. The patient   tolerated procedure well and was brought to Recovery Room in stable condition.   Sponge and needle counts were correct at the end of the case.    Blood loss was min, complications are none, consent was obtained and pathology was gastrectomy.

## 2019-03-27 NOTE — INTERVAL H&P NOTE
The patient has been examined and the H&P has been reviewed:    I concur with the findings and no changes have occurred since H&P was written.    Anesthesia/Surgery risks, benefits and alternative options discussed and understood by patient/family.          Active Hospital Problems    Diagnosis  POA    Class 2 severe obesity due to excess calories with serious comorbidity and body mass index (BMI) of 39.0 to 39.9 in adult [E66.01, Z68.39]  Not Applicable      Resolved Hospital Problems   No resolved problems to display.

## 2019-03-27 NOTE — TRANSFER OF CARE
"Anesthesia Transfer of Care Note    Patient: Tiffany Hendrix    Procedure(s) Performed: Procedure(s) (LRB):  GASTRECTOMY, SLEEVE, LAPAROSCOPIC (N/A)    Patient location: PACU    Anesthesia Type: general    Transport from OR: Transported from OR on 6-10 L/min O2 by face mask with adequate spontaneous ventilation    Post pain: adequate analgesia    Post assessment: tolerated procedure well and no apparent anesthetic complications    Post vital signs: stable    Level of consciousness: awake, alert and oriented    Nausea/Vomiting: no nausea/vomiting    Complications: none    Transfer of care protocol was followed      Last vitals:   Visit Vitals  /64 (BP Location: Right arm, Patient Position: Lying)   Pulse 82   Temp 37 °C (98.6 °F) (Oral)   Resp 18   Ht 5' 7" (1.702 m)   Wt 108.4 kg (238 lb 15.7 oz)   SpO2 100%   Breastfeeding? No   BMI 37.43 kg/m²     "

## 2019-03-27 NOTE — NURSING TRANSFER
Nursing Transfer Note      3/27/2019     Transfer To: 545     Transfer via stretcher    Transfer with 2L NC to O2    Transported by pct    Medicines sent: dilaudid pca, mivf    Chart send with patient: Yes

## 2019-03-27 NOTE — BRIEF OP NOTE
Operative Note       Surgery Date: 3/27/2019     Surgeon(s) and Role:     * Jaime Coe MD - Primary     * Norman Lemus MD - Resident - Assisting    Pre-op Diagnosis:  Morbid obesity [E66.01]  CAMRON (obstructive sleep apnea) [G47.33]  Gastroesophageal reflux disease, esophagitis presence not specified [K21.9]  Type 2 diabetes mellitus with other specified complication, with long-term current use of insulin [E11.69, Z79.4]  Hypertension, unspecified type [I10]    Post-op Diagnosis:  Morbid obesity [E66.01]  CAMRON (obstructive sleep apnea) [G47.33]  Gastroesophageal reflux disease, esophagitis presence not specified [K21.9]  Type 2 diabetes mellitus with other specified complication, with long-term current use of insulin [E11.69, Z79.4]  Hypertension, unspecified type [I10]    Procedure(s) (LRB):  GASTRECTOMY, SLEEVE, LAPAROSCOPIC (N/A)    Anesthesia: General    Procedure in Detail/Findings:  Sleeve without apparent complication    Estimated Blood Loss: Minimal           Specimens (From admission, onward)    Start     Ordered    03/27/19 0904  Specimen to Pathology - Surgery  Once     Start Status     03/27/19 0904 Collected (03/27/19 0915) Order ID: [id number]       03/27/19 0914        Implants: * No implants in log *           Disposition: PACU - hemodynamically stable.           Condition: Good    Attestation:  I was present and scrubbed for the entire procedure.

## 2019-03-28 VITALS
HEART RATE: 95 BPM | SYSTOLIC BLOOD PRESSURE: 110 MMHG | RESPIRATION RATE: 16 BRPM | DIASTOLIC BLOOD PRESSURE: 59 MMHG | OXYGEN SATURATION: 94 % | TEMPERATURE: 99 F | HEIGHT: 67 IN | BODY MASS INDEX: 40.07 KG/M2 | WEIGHT: 255.31 LBS

## 2019-03-28 LAB
ANION GAP SERPL CALC-SCNC: 12 MMOL/L (ref 8–16)
BASOPHILS # BLD AUTO: 0.02 K/UL (ref 0–0.2)
BASOPHILS # BLD AUTO: 0.02 K/UL (ref 0–0.2)
BASOPHILS NFR BLD: 0.1 % (ref 0–1.9)
BASOPHILS NFR BLD: 0.1 % (ref 0–1.9)
BUN SERPL-MCNC: 8 MG/DL (ref 6–20)
CALCIUM SERPL-MCNC: 8.7 MG/DL (ref 8.7–10.5)
CHLORIDE SERPL-SCNC: 106 MMOL/L (ref 95–110)
CO2 SERPL-SCNC: 20 MMOL/L (ref 23–29)
CREAT SERPL-MCNC: 0.7 MG/DL (ref 0.5–1.4)
DIFFERENTIAL METHOD: ABNORMAL
DIFFERENTIAL METHOD: ABNORMAL
EOSINOPHIL # BLD AUTO: 0.1 K/UL (ref 0–0.5)
EOSINOPHIL # BLD AUTO: 0.1 K/UL (ref 0–0.5)
EOSINOPHIL NFR BLD: 0.4 % (ref 0–8)
EOSINOPHIL NFR BLD: 0.4 % (ref 0–8)
ERYTHROCYTE [DISTWIDTH] IN BLOOD BY AUTOMATED COUNT: 14.3 % (ref 11.5–14.5)
ERYTHROCYTE [DISTWIDTH] IN BLOOD BY AUTOMATED COUNT: 14.6 % (ref 11.5–14.5)
EST. GFR  (AFRICAN AMERICAN): >60 ML/MIN/1.73 M^2
EST. GFR  (NON AFRICAN AMERICAN): >60 ML/MIN/1.73 M^2
GLUCOSE SERPL-MCNC: 66 MG/DL (ref 70–110)
HCT VFR BLD AUTO: 38.1 % (ref 37–48.5)
HCT VFR BLD AUTO: 39.9 % (ref 37–48.5)
HGB BLD-MCNC: 12.1 G/DL (ref 12–16)
HGB BLD-MCNC: 12.4 G/DL (ref 12–16)
IMM GRANULOCYTES # BLD AUTO: 0.05 K/UL (ref 0–0.04)
IMM GRANULOCYTES # BLD AUTO: 0.05 K/UL (ref 0–0.04)
IMM GRANULOCYTES NFR BLD AUTO: 0.4 % (ref 0–0.5)
IMM GRANULOCYTES NFR BLD AUTO: 0.4 % (ref 0–0.5)
LYMPHOCYTES # BLD AUTO: 1.9 K/UL (ref 1–4.8)
LYMPHOCYTES # BLD AUTO: 2.3 K/UL (ref 1–4.8)
LYMPHOCYTES NFR BLD: 13.3 % (ref 18–48)
LYMPHOCYTES NFR BLD: 16.3 % (ref 18–48)
MAGNESIUM SERPL-MCNC: 2.1 MG/DL (ref 1.6–2.6)
MCH RBC QN AUTO: 29 PG (ref 27–31)
MCH RBC QN AUTO: 29.5 PG (ref 27–31)
MCHC RBC AUTO-ENTMCNC: 31.1 G/DL (ref 32–36)
MCHC RBC AUTO-ENTMCNC: 31.8 G/DL (ref 32–36)
MCV RBC AUTO: 93 FL (ref 82–98)
MCV RBC AUTO: 93 FL (ref 82–98)
MONOCYTES # BLD AUTO: 0.9 K/UL (ref 0.3–1)
MONOCYTES # BLD AUTO: 0.9 K/UL (ref 0.3–1)
MONOCYTES NFR BLD: 6.1 % (ref 4–15)
MONOCYTES NFR BLD: 6.7 % (ref 4–15)
NEUTROPHILS # BLD AUTO: 10.6 K/UL (ref 1.8–7.7)
NEUTROPHILS # BLD AUTO: 11.1 K/UL (ref 1.8–7.7)
NEUTROPHILS NFR BLD: 76.7 % (ref 38–73)
NEUTROPHILS NFR BLD: 79.1 % (ref 38–73)
NRBC BLD-RTO: 0 /100 WBC
NRBC BLD-RTO: 0 /100 WBC
PHOSPHATE SERPL-MCNC: 2 MG/DL (ref 2.7–4.5)
PLATELET # BLD AUTO: 237 K/UL (ref 150–350)
PLATELET # BLD AUTO: 254 K/UL (ref 150–350)
PMV BLD AUTO: 11 FL (ref 9.2–12.9)
PMV BLD AUTO: 11.5 FL (ref 9.2–12.9)
POTASSIUM SERPL-SCNC: 4 MMOL/L (ref 3.5–5.1)
RBC # BLD AUTO: 4.1 M/UL (ref 4–5.4)
RBC # BLD AUTO: 4.27 M/UL (ref 4–5.4)
SODIUM SERPL-SCNC: 138 MMOL/L (ref 136–145)
WBC # BLD AUTO: 13.86 K/UL (ref 3.9–12.7)
WBC # BLD AUTO: 14.06 K/UL (ref 3.9–12.7)

## 2019-03-28 PROCEDURE — 85025 COMPLETE CBC W/AUTO DIFF WBC: CPT | Mod: 91

## 2019-03-28 PROCEDURE — 25000003 PHARM REV CODE 250: Performed by: STUDENT IN AN ORGANIZED HEALTH CARE EDUCATION/TRAINING PROGRAM

## 2019-03-28 PROCEDURE — 80048 BASIC METABOLIC PNL TOTAL CA: CPT

## 2019-03-28 PROCEDURE — 63600175 PHARM REV CODE 636 W HCPCS: Performed by: STUDENT IN AN ORGANIZED HEALTH CARE EDUCATION/TRAINING PROGRAM

## 2019-03-28 PROCEDURE — 36415 COLL VENOUS BLD VENIPUNCTURE: CPT

## 2019-03-28 PROCEDURE — C9113 INJ PANTOPRAZOLE SODIUM, VIA: HCPCS | Performed by: STUDENT IN AN ORGANIZED HEALTH CARE EDUCATION/TRAINING PROGRAM

## 2019-03-28 PROCEDURE — 83735 ASSAY OF MAGNESIUM: CPT

## 2019-03-28 PROCEDURE — 84100 ASSAY OF PHOSPHORUS: CPT

## 2019-03-28 RX ORDER — SODIUM,POTASSIUM PHOSPHATES 280-250MG
2 POWDER IN PACKET (EA) ORAL
Status: DISCONTINUED | OUTPATIENT
Start: 2019-03-28 | End: 2019-03-28 | Stop reason: HOSPADM

## 2019-03-28 RX ADMIN — DIPHENHYDRAMINE HYDROCHLORIDE 12.5 MG: 50 INJECTION, SOLUTION INTRAMUSCULAR; INTRAVENOUS at 06:03

## 2019-03-28 RX ADMIN — HYDROCODONE BITARTRATE AND ACETAMINOPHEN 15 ML: 7.5; 325 SOLUTION ORAL at 10:03

## 2019-03-28 RX ADMIN — POTASSIUM & SODIUM PHOSPHATES POWDER PACK 280-160-250 MG 2 PACKET: 280-160-250 PACK at 10:03

## 2019-03-28 RX ADMIN — Medication: at 05:03

## 2019-03-28 RX ADMIN — ONDANSETRON 4 MG: 2 INJECTION INTRAMUSCULAR; INTRAVENOUS at 10:03

## 2019-03-28 RX ADMIN — ONDANSETRON 4 MG: 2 INJECTION INTRAMUSCULAR; INTRAVENOUS at 06:03

## 2019-03-28 RX ADMIN — PANTOPRAZOLE SODIUM 40 MG: 40 INJECTION, POWDER, FOR SOLUTION INTRAVENOUS at 08:03

## 2019-03-28 NOTE — DISCHARGE SUMMARY
[facility]  General Surgery  Discharge Summary      Patient Name: Tiffany Hendrix  MRN: [id number]  Admission Date: 3/27/2019  Hospital Length of Stay: 1 days  Discharge Date and Time:  03/28/2019 8:26 AM  Attending Physician: Jaime Coe MD   Discharging Provider: EMILY Ritter Jr., MD  Primary Care Provider: Fabio Celeste MD     HPI: Tiffany Hendrix is a 53 y.o. female who presents for pre-operative exam for weight loss surgery.  she has completed her pre-operative evaluation.  she has failed medical treatment for obesity.  1. Morbid Obesity with Body mass index is 37.95 kg/m². and inability to lose weight.  2. Co-morbidities: DM type 2 without insulin or complications, HLD, essential HTN, anxiety/depression, CAMRON requiring cpap but not currently on, GERD controlled on ppi    Procedure(s) (LRB):  GASTRECTOMY, SLEEVE, LAPAROSCOPIC (N/A)     Hospital Course: The patient tolerated the procedure well and subsequently had a benign hospital course.      On the night of POD 0, had a drop in systolic blood pressure to 90, which was asymptomatic.  This resolved without any intervention.    Pt was started on a surgically progressive diet, which she tolerated well.  At this time, her pain is controlled with oral pain medication, she is ambulating well, and is able to urinate on her own.  Patient stable for discharge.      Pending Diagnostic Studies:     None        Final Active Diagnoses:    Diagnosis Date Noted POA    PRINCIPAL PROBLEM:  Class 2 severe obesity due to excess calories with serious comorbidity and body mass index (BMI) of 39.0 to 39.9 in adult [E66.01, Z68.39] 02/26/2013 Not Applicable      Problems Resolved During this Admission:      Discharged Condition: good    Disposition: Home or Self Care    Follow Up:  Follow-up Information     Jaime Coe MD In 2 weeks.    Specialties:  General Surgery, Bariatrics  Why:  For wound re-check  Contact information:  [address]  [facility] [address]  [phone]                 Patient Instructions:      Lifting restrictions   Order Comments: Do not lift anything greater than 10-15 lbs until seen in clinic     Notify your health care provider if you experience any of the following:  increased confusion or weakness     Notify your health care provider if you experience any of the following:  persistent dizziness, light-headedness, or visual disturbances     Notify your health care provider if you experience any of the following:  worsening rash     Notify your health care provider if you experience any of the following:  severe persistent headache     Notify your health care provider if you experience any of the following:  difficulty breathing or increased cough     Notify your health care provider if you experience any of the following:  redness, tenderness, or signs of infection (pain, swelling, redness, odor or green/yellow discharge around incision site)     Notify your health care provider if you experience any of the following:  severe uncontrolled pain     Notify your health care provider if you experience any of the following:  persistent nausea and vomiting or diarrhea     Notify your health care provider if you experience any of the following:  temperature >100.4     No dressing needed     Activity as tolerated   Order Comments: Outpatient post-operative instructions:    Diet: Follow your diet book and call the bariatric clinic for any questions.    Medications: - Miralax daily for constipation, no fiber.  - No NSAIDs such as aleve, ibuprofen, naproxen  - No swallowing whole pills larger than the size of a pencil eraser until cleared by your surgeon. Crush all medications and mix in liquid. Open all capsules into liquid.   Wounds: There are tape strips directly on your skin called steri-strips with a dressing or Band-Aids on top.  Remove the dressing or Band-Aids 2 days after surgery.  Please leave the steri-strips  on until they fall off.  If they have not fallen off they will be removed in clinic.  Bathing: Please do not take any baths or swim until after being seen by you doctor.  You may shower after the bandaids have been removed.  It is ok for the shower water to run onto the wounds, wash with soap and water normally.  Please pat them dry.  Activity:  For most laparoscopic cases patients must be light duty for 2 weeks.  If you have had a hernia repair you must continue light duty for 6 weeks.  Light duty means no lifting over 10 pounds (about a gallon of milk) or equivalent activity.  No golfing except putting, no fishing, no vacuuming and no mowing.  For exercise please keep to light exercise only.  No weight lifting or strenuous exercise.  Walking as much as you are comfortable to do is ok.  Driving is ok once you feel fully alert, off pain medication and have no pain bending or twisting.  Sex is ok once the pain has subsided and as long as it is not strenuous while you are on light duty.  Work: You may do light duty work when you feel up to it.  For most patients that is 1-2 weeks after surgery.  This is light duty only until you are off restrictions.     Medications:  Reconciled Home Medications:      Medication List      CONTINUE taking these medications    aspirin 81 MG EC tablet  Commonly known as:  ECOTRIN  Take 81 mg by mouth once daily.     benzoyl peroxide-erythromycin gel  Commonly known as:  BENZAMYCIN  Apply to affected area every day at bedtime     buPROPion 100 MG tablet  Commonly known as:  WELLBUTRIN  Take 1 tablet (100 mg total) by mouth once daily.     hydrocodone-apap 7.5-325 MG/15 ML oral solution  Commonly known as:  HYCET  Take 15 mLs by mouth 4 (four) times daily as needed for Pain.     INV TESTOSTERONE/ANASTROZOL OR PLACEBO PELLETS  Inject 1 Pellet into the skin. For investigational use only.     lamoTRIgine 100 MG tablet  Commonly known as:  LAMICTAL  Take 1 tablet (100 mg total) by mouth once  daily.     levothyroxine 125 MCG tablet  Commonly known as:  SYNTHROID  Take 1 tablet (125 mcg total) by mouth once daily.     lisinopril 5 MG tablet  Commonly known as:  PRINIVIL,ZESTRIL  Take 5 mg by mouth once daily.     omeprazole 40 MG capsule  Commonly known as:  PRILOSEC  Take 1 capsule (40 mg total) by mouth every morning. Open capsule and take with apple sauce     ondansetron 8 MG Tbdl  Commonly known as:  ZOFRAN-ODT  Take 1 tablet (8 mg total) by mouth every 6 (six) hours as needed.     pantoprazole 40 MG tablet  Commonly known as:  PROTONIX  Take 40 mg by mouth once daily.     rosuvastatin 20 MG tablet  Commonly known as:  CRESTOR  Take 20 mg by mouth every evening.     ursodiol 500 MG tablet  Commonly known as:  ACTIGALL  Crush one tablet daily for gall bladder.        STOP taking these medications    DULoxetine 30 MG capsule  Commonly known as:  CYMBALTA     metoprolol succinate 50 mg Cspx            C Chuckie Ritter Jr., MD  General Surgery  [facility]

## 2019-03-28 NOTE — NURSING
Patient discharging home at this time.  Medication education, aftercare instructions and follow up appointment provided. Pt verbalized understanding.

## 2019-03-28 NOTE — RESIDENT HANDOFF
Called that pt hypotensive, systolics in mid 90s.  No tachycardia.  Adequate UOP per pt (not recorded in chart at time of call).  I think likely just 2/2 PCA.  Told nursing to call if systolics continued to drop, UOP decreased, or with any tachycardia.  Will update note with changes    Events at 2300.        Phoenix Murphy MD  General Surgery, PGY-1  [phone]

## 2019-03-28 NOTE — PLAN OF CARE
11:14 AM Unable to complete discharge planning assessment due to patient has already discharged to home, without needs.        03/28/19 1114   Discharge Assessment   Assessment Type Discharge Planning Assessment   Confirmed/corrected address and phone number on facesheet? No   Assessment information obtained from? Medical Record   Expected Length of Stay (days)   (1)   Communicated expected length of stay with patient/caregiver yes  (Per MD.)   Prior to hospitilization cognitive status: Alert/Oriented;No Deficits   Prior to hospitalization functional status: Independent   Current cognitive status: Alert/Oriented;No Deficits   Current Functional Status: Independent   Facility Arrived From:   (N/A)   Lives With spouse   Able to Return to Prior Arrangements yes   Is patient able to care for self after discharge? Yes   Who are your caregiver(s) and their phone number(s)?   (Collin Hendrix Spouse [phone] )   Patient's perception of discharge disposition home or selfcare   Readmission Within the Last 30 Days no previous admission in last 30 days   Does the patient have transportation home? Yes   Transportation Anticipated family or friend will provide   Discharge Plan A Home with family   Discharge Plan B Home with family   DME Needed Upon Discharge  none   Patient/Family in Agreement with Plan yes  (Per MD & floor nurse)

## 2019-03-28 NOTE — PLAN OF CARE
03/28/19 1114   Final Note   Assessment Type Final Discharge Note   Anticipated Discharge Disposition Home   What phone number can be called within the next 1-3 days to see how you are doing after discharge?   ([phone])   Hospital Follow Up  Appt(s) scheduled? Yes   Discharge plans and expectations educations in teach back method with documentation complete? Yes   Right Care Referral Info   Post Acute Recommendation No Care

## 2019-04-03 ENCOUNTER — TELEPHONE (OUTPATIENT)
Dept: BARIATRICS | Facility: CLINIC | Age: 54
End: 2019-04-03

## 2019-04-03 NOTE — TELEPHONE ENCOUNTER
Patient called back- she stated that pain/ soreness (7/10)  at incisions under breast.  Denies fever, swelling, drainage.  Patient does not feel that she needs an appt at this time.  She stated that her father in law passed away and she was running around helping her mother in law.  She denies lifting, pulling, pushing greater than 10 lbs.  She stated that she has increased her activity and it could be that causing her pain.  Advised pt to continue with activity but to listen to her body - reinforced no lifting, pulling or pushing >10 lbs for 6 weeks post op.    She is taking pain medication 5 times day although it is ordered 4 times daily.  Instructed patient to decrease to 4 times daily as ordered and alternate with tylenol.  Informed patient to stop wearing her underwire bra- suggested a bra that does not have underwire.   Patient verbalized understanding of instructions and will call back if pain continues or worsens

## 2019-04-03 NOTE — TELEPHONE ENCOUNTER
Called to check in 1 week post op from bariatric surgery.    Water protocol began at 7 am and completed while in hospital/ medicine cups given to you by nursing to take home (y/n):y    Dehydration assessment:  Urine output/color:clear  Chest pain:n  Persistent increased heart rate:n  Fatigue:n  N/V: n  Dizzy/weak: n  BM:before sx, started miralax  Protein and fluid intake assessment: (food diary)  Fluid intake: water, broth  Protein supplements: premier   Protein intake yesterday: 1 protein shake with skim milk  Vitamins  -What vitamins are you taking?y  -Are you tolerating well?y  Medications  Omeprazole:yes  Hycet:yes galina passed away on Sunday, liquid 1/4 left to make it thru Friday every 4 hrs  How are you tolerating pain at this time? (rate on a scale from 1 to 10; >7 notify PA/MD)taking pain 7 under breast belly button  Are you taking home/other medications as prescribed? Are you having any problems? (f/u with PCP, cardiologist, endocrinologist)none yet  How is your support system at home?  Exercise reminder (light exercise at this time, no lifting above 10 lbs)n     Questions for nurse/MA/PA:      Assessment:  Doing well.     Discussion:  Continue to work on fluid and protein intake.     Confirmed date and time for 2 week po labs and clinic visit  4/10/2019 at 840am for non fasting blood work  ----- Message from Sarah Coronel RD sent at 3/20/2019  8:49 AM CDT -----  1 week post op

## 2019-04-10 ENCOUNTER — CLINICAL SUPPORT (OUTPATIENT)
Dept: BARIATRICS | Facility: CLINIC | Age: 54
End: 2019-04-10
Payer: COMMERCIAL

## 2019-04-10 ENCOUNTER — LAB VISIT (OUTPATIENT)
Dept: LAB | Facility: HOSPITAL | Age: 54
End: 2019-04-10
Attending: SURGERY
Payer: COMMERCIAL

## 2019-04-10 ENCOUNTER — OFFICE VISIT (OUTPATIENT)
Dept: BARIATRICS | Facility: CLINIC | Age: 54
End: 2019-04-10
Payer: COMMERCIAL

## 2019-04-10 VITALS
HEART RATE: 77 BPM | WEIGHT: 237.19 LBS | SYSTOLIC BLOOD PRESSURE: 122 MMHG | DIASTOLIC BLOOD PRESSURE: 80 MMHG | HEIGHT: 67 IN | BODY MASS INDEX: 37.23 KG/M2

## 2019-04-10 DIAGNOSIS — I10 HYPERTENSION, UNSPECIFIED TYPE: ICD-10-CM

## 2019-04-10 DIAGNOSIS — E11.69 TYPE 2 DIABETES MELLITUS WITH OTHER SPECIFIED COMPLICATION, WITH LONG-TERM CURRENT USE OF INSULIN: ICD-10-CM

## 2019-04-10 DIAGNOSIS — K21.9 GASTROESOPHAGEAL REFLUX DISEASE, ESOPHAGITIS PRESENCE NOT SPECIFIED: ICD-10-CM

## 2019-04-10 DIAGNOSIS — Z79.4 TYPE 2 DIABETES MELLITUS WITH OTHER SPECIFIED COMPLICATION, WITH LONG-TERM CURRENT USE OF INSULIN: ICD-10-CM

## 2019-04-10 DIAGNOSIS — Z98.890 POSTOPERATIVE STATE: Primary | ICD-10-CM

## 2019-04-10 DIAGNOSIS — R63.4 WEIGHT LOSS: ICD-10-CM

## 2019-04-10 DIAGNOSIS — Z98.84 STATUS POST LAPAROSCOPIC SLEEVE GASTRECTOMY: ICD-10-CM

## 2019-04-10 DIAGNOSIS — G47.33 OSA (OBSTRUCTIVE SLEEP APNEA): ICD-10-CM

## 2019-04-10 DIAGNOSIS — Z98.84 S/P LAPAROSCOPIC SLEEVE GASTRECTOMY: ICD-10-CM

## 2019-04-10 DIAGNOSIS — E66.01 MORBID OBESITY: ICD-10-CM

## 2019-04-10 PROBLEM — R10.9 FLANK PAIN: Status: RESOLVED | Noted: 2018-03-12 | Resolved: 2019-04-10

## 2019-04-10 LAB
ALBUMIN SERPL BCP-MCNC: 3.8 G/DL (ref 3.5–5.2)
ALP SERPL-CCNC: 70 U/L (ref 55–135)
ALT SERPL W/O P-5'-P-CCNC: 16 U/L (ref 10–44)
ANION GAP SERPL CALC-SCNC: 9 MMOL/L (ref 8–16)
AST SERPL-CCNC: 19 U/L (ref 10–40)
BASOPHILS # BLD AUTO: 0.03 K/UL (ref 0–0.2)
BASOPHILS NFR BLD: 0.3 % (ref 0–1.9)
BILIRUB SERPL-MCNC: 0.4 MG/DL (ref 0.1–1)
BUN SERPL-MCNC: 7 MG/DL (ref 6–20)
CALCIUM SERPL-MCNC: 10 MG/DL (ref 8.7–10.5)
CHLORIDE SERPL-SCNC: 105 MMOL/L (ref 95–110)
CO2 SERPL-SCNC: 27 MMOL/L (ref 23–29)
CREAT SERPL-MCNC: 0.8 MG/DL (ref 0.5–1.4)
DIFFERENTIAL METHOD: ABNORMAL
EOSINOPHIL # BLD AUTO: 0.2 K/UL (ref 0–0.5)
EOSINOPHIL NFR BLD: 2.6 % (ref 0–8)
ERYTHROCYTE [DISTWIDTH] IN BLOOD BY AUTOMATED COUNT: 14.9 % (ref 11.5–14.5)
EST. GFR  (AFRICAN AMERICAN): >60 ML/MIN/1.73 M^2
EST. GFR  (NON AFRICAN AMERICAN): >60 ML/MIN/1.73 M^2
GLUCOSE SERPL-MCNC: 95 MG/DL (ref 70–110)
HCT VFR BLD AUTO: 42.7 % (ref 37–48.5)
HGB BLD-MCNC: 13.5 G/DL (ref 12–16)
IMM GRANULOCYTES # BLD AUTO: 0.03 K/UL (ref 0–0.04)
IMM GRANULOCYTES NFR BLD AUTO: 0.3 % (ref 0–0.5)
LYMPHOCYTES # BLD AUTO: 2.8 K/UL (ref 1–4.8)
LYMPHOCYTES NFR BLD: 31.3 % (ref 18–48)
MCH RBC QN AUTO: 29.2 PG (ref 27–31)
MCHC RBC AUTO-ENTMCNC: 31.6 G/DL (ref 32–36)
MCV RBC AUTO: 92 FL (ref 82–98)
MONOCYTES # BLD AUTO: 0.6 K/UL (ref 0.3–1)
MONOCYTES NFR BLD: 7 % (ref 4–15)
NEUTROPHILS # BLD AUTO: 5.3 K/UL (ref 1.8–7.7)
NEUTROPHILS NFR BLD: 58.5 % (ref 38–73)
NRBC BLD-RTO: 0 /100 WBC
PLATELET # BLD AUTO: 256 K/UL (ref 150–350)
PMV BLD AUTO: 12.1 FL (ref 9.2–12.9)
POTASSIUM SERPL-SCNC: 4.4 MMOL/L (ref 3.5–5.1)
PROT SERPL-MCNC: 7.2 G/DL (ref 6–8.4)
RBC # BLD AUTO: 4.63 M/UL (ref 4–5.4)
SODIUM SERPL-SCNC: 141 MMOL/L (ref 136–145)
VIT B12 SERPL-MCNC: 629 PG/ML (ref 210–950)
WBC # BLD AUTO: 9.06 K/UL (ref 3.9–12.7)

## 2019-04-10 PROCEDURE — 99999 PR PBB SHADOW E&M-EST. PATIENT-LVL I: CPT | Mod: PBBFAC,,, | Performed by: DIETITIAN, REGISTERED

## 2019-04-10 PROCEDURE — 99499 UNLISTED E&M SERVICE: CPT | Mod: S$GLB,,, | Performed by: DIETITIAN, REGISTERED

## 2019-04-10 PROCEDURE — 99024 PR POST-OP FOLLOW-UP VISIT: ICD-10-PCS | Mod: S$GLB,,, | Performed by: NURSE PRACTITIONER

## 2019-04-10 PROCEDURE — 82607 VITAMIN B-12: CPT

## 2019-04-10 PROCEDURE — 80053 COMPREHEN METABOLIC PANEL: CPT

## 2019-04-10 PROCEDURE — 85025 COMPLETE CBC W/AUTO DIFF WBC: CPT

## 2019-04-10 PROCEDURE — 99999 PR PBB SHADOW E&M-EST. PATIENT-LVL I: ICD-10-PCS | Mod: PBBFAC,,, | Performed by: DIETITIAN, REGISTERED

## 2019-04-10 PROCEDURE — 99999 PR PBB SHADOW E&M-EST. PATIENT-LVL IV: ICD-10-PCS | Mod: PBBFAC,,, | Performed by: NURSE PRACTITIONER

## 2019-04-10 PROCEDURE — 99024 POSTOP FOLLOW-UP VISIT: CPT | Mod: S$GLB,,, | Performed by: NURSE PRACTITIONER

## 2019-04-10 PROCEDURE — 99499 NO LOS: ICD-10-PCS | Mod: S$GLB,,, | Performed by: DIETITIAN, REGISTERED

## 2019-04-10 PROCEDURE — 99999 PR PBB SHADOW E&M-EST. PATIENT-LVL IV: CPT | Mod: PBBFAC,,, | Performed by: NURSE PRACTITIONER

## 2019-04-10 PROCEDURE — 36415 COLL VENOUS BLD VENIPUNCTURE: CPT

## 2019-04-10 PROCEDURE — 84425 ASSAY OF VITAMIN B-1: CPT

## 2019-04-10 RX ORDER — URSODIOL 500 MG/1
TABLET, FILM COATED ORAL
Qty: 90 TABLET | Refills: 0 | Status: SHIPPED | OUTPATIENT
Start: 2019-04-10 | End: 2019-07-08 | Stop reason: SDUPTHER

## 2019-04-10 NOTE — PATIENT INSTRUCTIONS
High Protein Pureed Diet    2 weeks after gastric bypass and sleeve you may be ready to add pureed food to your diet.  All food should be the consistency of baby food, or thinner.  Follow pureed diet for the next 2 weeks.    Protein - It is very important to pay attention to protein intake during this time.      Inadequate protein intake can cause:  ? Delayed Wound Healing  ? Hair Loss  ? Muscle Breakdown    Meal Plan - Eat 3-4 meals per day (2-4 tbsp each), with protein supplements in between to meet protein needs.  Meeting protein needs daily will help increase healing, decrease muscle loss, and increase weight loss.  Your goal is  grams of protein a day.    Protein First - Always eat the foods with the highest protein first.  Foods high in protein include milk, yogurt, cheese, egg whites, and blenderized meat, seafood, and beans.    Fluids - Keep track in your journal of how much you are drinking; you should try to drink at least 64oz of fluids every day.      Foods allowed: Portion size Protein (g)   ? Sugar-free clear liquids As desired 0   ? Skim or 1% milk ½ cup 4   ? Sugar free pudding, light yogurt, custard (use skim or 1% milk in preparation) 3 oz 2.5   ? Strained baby food meats, or home-made pureed lean meats and shrimp 1 oz 7   ? Beans (red, white, black, lima, carter, fat free refried, hummus) and lentils ¼ cup 4   ? Low-fat/fat free cheese.(cottage cheese, mozzarella string cheese, ricotta cheese, Laughing Cow, Baby Bell, cheddar, etc) ¼ cup 7-8   ? Scrambled eggs or Egg Beaters 1 or ¼ cup 6   ? Edamame or Tofu, mashed ¼ cup 5   ? Unflavored protein powder (add to 1 scoop to  98% fat free soups or SF pudding) 3 Tbsp 9   ? *PB2: peanut powder (45 calories) 2 Tbsp 5     *PB2 powdered peanut butter: 45 calories vs. 190 calories in 2 tbsp of regular peanut butter. Purchase online at [facility], or  at various [employer], [employer], [employer], [employer] and [employer].      Bariatric Liquid/Pureed Sample  Menu    3-4 small meals plus 2-3 protein drinks per day.      8-8:30am 1 egg or ¼ cup Egg Beaters   9-9:30am 1 cup water, or decaf coffee or tea   10-10:30am Protein drink, 30g protein   11-11:30am 2 tbsp low-fat cottage cheese, and 1 tbsp pureed peaches   12-12:30pm 1 cup water, or sugar-free lemonade    1-1:30pm 2 tbsp pureed chicken, and 1 tbsp pureed carrots    2-2:30pm 1 cup water, or sugar-free lemonade   3-3:30pm Protein drink, 30g protein   5-5:30pm 1 cup water    6-6:30pm 1 cup hi-protein creamy chicken soup 14g protein (see Recipe below)   7-7:30pm 1 cup water, or sugar-free fruit punch    8-8:30pm 1 cup water       This sample menu provides approx. 80g protein and 64oz fluids.  Liquid protein supplements should contain 20-30g protein and less than 4 grams of sugar each.    ? Sip fluids continuously in between meals.  Drink at least ¼ cup every 15 minutes.  ? For fluids: ¼ cup = 2 oz = 4 tbsp       RECIPE IDEAS for Bariatric Pureed Diet:    Hi-Protein Creamy Chicken Soup: (10g protein per 1 cup serving)  Empty 1 can of 98% fat free cream of chicken soup into saucepan. Then  blend 1 scoop of unflavored protein powder with 1 can of skim milk until smooth.  Add protein milk to saucepan and heat to warm. (Note: Do NOT boil. Protein powder may clump if heated too hot).     Hi-Protein Pudding: (14g protein per ½ cup serving)  Add 2 scoops protein powder to 2 cups cold skim milk and mix well.  Stir in dry Jell-O Sugar-Free Instant Pudding mix.  Chill and Enjoy!    Tuna Mousse (12g protein per ¼ cup serving) Page 135 in book Eating Well After Weight Loss Surgery.  In a  or , combine all ingredients and pulse until smooth.  2 6-ounce cans tuna packed in water, drained  2 tbsp low-fat mayonnaise  2 tbsp fat-free sour cream  2 tbsp fat-free cream cheese, softened  ½ cup shallots, finely chopped  1 tbsp lemon juice  ¼ tsp ground pepper  ½ tsp celery seed    Chocolate Peanut Butter Mousse  (28g  protein total)  6oz plain Greek yogurt  4 tbsp chocolate PB2

## 2019-04-10 NOTE — PROGRESS NOTES
BARIATRIC FOLLOW UP:    Chief Complaint   Patient presents with    Follow-up       HISTORY OF PRESENT ILLNESS: Tiffany Hendrix is a 53 y.o. female with a Body mass index is 37.15 kg/m². who presents for follow up s/p lap sleeve with Dr. Coe on 3/27/2019.  she is doing well and tolerating the diet without difficulty.  she is losing weight appropriately. Denies hypoglycemia.    Review of Systems   Constitutional: Negative for chills, fever and malaise/fatigue.   Eyes: Negative for blurred vision and double vision.   Respiratory: Negative for cough, shortness of breath and wheezing.    Cardiovascular: Negative for chest pain, palpitations and leg swelling.   Gastrointestinal: Negative for abdominal pain, blood in stool, constipation, diarrhea, heartburn, melena, nausea and vomiting.   Genitourinary: Negative for dysuria, frequency and urgency.   Musculoskeletal: Negative for back pain, joint pain, myalgias and neck pain.   Neurological: Negative for dizziness, tingling and headaches.   Psychiatric/Behavioral: Negative for depression and suicidal ideas. The patient is not nervous/anxious.      EXERCISE & VITAMINS:  Adherent with bariatric vitamin regimen. See RD note.  Bariatric Fusion, 4 a day, contains 12mg B1 total.  Exercise- none    MEDICATIONS/ALLERGIES:  Have been reviewed.    DIET:  Liquid Bariatric Diet.   Adequate protein and water.  See dietitian's note from today for further details.     Vitals:    04/10/19 0854   BP: 122/80   Pulse: 77       Physical Exam   Constitutional: She is oriented to person, place, and time. She appears well-developed and well-nourished.   HENT:   Head: Normocephalic and atraumatic.   Eyes: Conjunctivae and EOM are normal.   Neck: Neck supple.   Cardiovascular: Normal rate, regular rhythm, normal heart sounds and intact distal pulses. Exam reveals no gallop and no friction rub.   No murmur heard.  Pulmonary/Chest: Effort normal and breath sounds normal. No respiratory  distress. She has no wheezes. She has no rales.   Abdominal: Soft. Bowel sounds are normal. She exhibits no distension and no mass. There is no tenderness. There is no rebound and no guarding. No hernia.   WHSS   Musculoskeletal: Normal range of motion. She exhibits no edema.   Neurological: She is alert and oriented to person, place, and time.   Skin: Skin is warm and dry.   Psychiatric: She has a normal mood and affect. Her behavior is normal.   Vitals reviewed.    ASSESSMENT:  - Morbid obesity, Body mass index is 37.15 kg/m².,  S/p  lap sleeve with Dr. Coe on 3/27/2019  - Estimated goal weight is 50% EWL  - Co-morbidities: DM type 2, HLD, HTN, anxiety/depression, CAMRON (no CPAP, none before surgery), GERD  - Good Weight loss, 15 lbs, 14% EWL  - No Exercise regimen  - Good Vitamin Regimen  - Good Diet    PLAN:  - Regular exercise and adherence to bariatric diet to achieve maximum weight loss.  - Follow-up with dietician to advance/re enforce diet.  - Full bariatric vitamin regimen  - Ursodiol 500 mg daily for 6 months for the gallbladder.  - Anti-Acid medication, on omeprazole, will stop and resume Protonix today.  - Lifting restriction, no lifting more than 10 lbs for 6 weeks total.  - Miralax daily for constipation, no fiber.  - No NSAIDs, Tylenol for pain.  - Can swallow whole pills- trial.  - RTC per post op schedule.  - Call the office for any issues.  - Check labs as scheduled.    20 minute visit, over 50% of time spent counseling patient face to face on diet, exercise, and weight loss.

## 2019-04-10 NOTE — PROGRESS NOTES
NUTRITION NOTE    Referring Physician: Jaime Coe M.D.  Reason for MNT Referral: Follow-up 2 Weeks s/p Gastric Sleeve    PAST MEDICAL HISTORY:  Denies nausea, vomiting, constipation and diarrhea.  Reports doing well.    Past Medical History:   Diagnosis Date    Acquired hypothyroidism 8/15/2018    Anemia     2016    Anxiety     Arthritis     Bipolar 1 disorder     Depression     Disorder of kidney and ureter     nephritis     Encounter for blood transfusion     2006, post spinal surgery     GERD (gastroesophageal reflux disease)     over 20 years    Hypertension     Hypothyroidism     Nontoxic multinodular goiter     Obesity     Pure hypercholesterolemia 8/15/2018    Type 2 diabetes mellitus without complication, without long-term current use of insulin 8/15/2018       CLINICAL DATA:  53 y.o. female.    There were no vitals filed for this visit.    Current Weight: 237 lbs lbs  BMI: 37.15  Total Weight Loss: 15 lbs  Excess Weight Loss: 14 lbs    LABS:  Reviewed.    CURRENT DIET:  Bariatric Liquid Diet    Diet Recall: 38-76 grams of protein/day; 48+ oz of fluids/day    Diet Includes: tomato juice, 16ounce  2-3 per day, SF jello and broth  Protein Supplements: premier protein with skim milk 38 gms per shake 1-2 in per day    EXERCISE:  None.    Restrictions to Exercise: None.    VITAMINS/MINERALS:  Bariatric fusion 2 chewables twice a day has 3 mg thiamine per tablet, 300 mg calcium per tablet and 11.25 mg iron per tablet.  Pt takes 4 total per day.  Pt also receives iron transfusions as needed.      ASSESSMENT:  Doing fairly well overall.  Inadequate protein intake.  Adequate fluid intake.    BARIATRIC DIET DISCUSSION:  Instructed and provided written materials on bariatric pureed diet plan.  Reinforced post-op nutrition guidelines.    PLAN/RECOMMONDATIONS:  Advance to bariatric pureed diet.  Increase protein intake.  Maintain fluid intake.  Begin light exercise.  Continue appropriate vitamins  & minerals.  Adjust vitamins & minerals by: may need additional thiamin if B-1 results show low thiamin level.  Will check for level and call pt if b-1 low    Return to clinic in 2 weeks.    SESSION TIME: 15 minutes

## 2019-04-11 LAB — VIT B1 BLD-MCNC: 75 UG/L (ref 38–122)

## 2019-05-01 ENCOUNTER — OFFICE VISIT (OUTPATIENT)
Dept: BARIATRICS | Facility: CLINIC | Age: 54
End: 2019-05-01
Payer: COMMERCIAL

## 2019-05-01 ENCOUNTER — CLINICAL SUPPORT (OUTPATIENT)
Dept: BARIATRICS | Facility: CLINIC | Age: 54
End: 2019-05-01
Payer: COMMERCIAL

## 2019-05-01 VITALS
BODY MASS INDEX: 35.82 KG/M2 | DIASTOLIC BLOOD PRESSURE: 70 MMHG | SYSTOLIC BLOOD PRESSURE: 106 MMHG | HEIGHT: 67 IN | WEIGHT: 228.19 LBS | HEART RATE: 69 BPM

## 2019-05-01 DIAGNOSIS — K21.9 GASTROESOPHAGEAL REFLUX DISEASE WITHOUT ESOPHAGITIS: ICD-10-CM

## 2019-05-01 DIAGNOSIS — R63.4 WEIGHT LOSS: ICD-10-CM

## 2019-05-01 DIAGNOSIS — Z98.890 POST-OPERATIVE STATE: Primary | ICD-10-CM

## 2019-05-01 DIAGNOSIS — I10 ESSENTIAL HYPERTENSION: ICD-10-CM

## 2019-05-01 DIAGNOSIS — E11.9 TYPE 2 DIABETES MELLITUS WITHOUT COMPLICATION, WITHOUT LONG-TERM CURRENT USE OF INSULIN: ICD-10-CM

## 2019-05-01 DIAGNOSIS — Z98.84 S/P LAPAROSCOPIC SLEEVE GASTRECTOMY: ICD-10-CM

## 2019-05-01 PROCEDURE — 99024 POSTOP FOLLOW-UP VISIT: CPT | Mod: S$GLB,,, | Performed by: PHYSICIAN ASSISTANT

## 2019-05-01 PROCEDURE — 99999 PR PBB SHADOW E&M-EST. PATIENT-LVL IV: ICD-10-PCS | Mod: PBBFAC,,, | Performed by: PHYSICIAN ASSISTANT

## 2019-05-01 PROCEDURE — 99024 PR POST-OP FOLLOW-UP VISIT: ICD-10-PCS | Mod: S$GLB,,, | Performed by: PHYSICIAN ASSISTANT

## 2019-05-01 PROCEDURE — 99999 PR PBB SHADOW E&M-EST. PATIENT-LVL IV: CPT | Mod: PBBFAC,,, | Performed by: PHYSICIAN ASSISTANT

## 2019-05-01 PROCEDURE — 99499 UNLISTED E&M SERVICE: CPT | Mod: S$GLB,,, | Performed by: DIETITIAN, REGISTERED

## 2019-05-01 PROCEDURE — 99499 NO LOS: ICD-10-PCS | Mod: S$GLB,,, | Performed by: DIETITIAN, REGISTERED

## 2019-05-01 NOTE — PATIENT INSTRUCTIONS
Bariatric Soft Diet           - Start Soft Diet 2 weeks after gastric banding  -   Start Soft Diet 4 weeks after gastric bypass and sleeve    As your stomach heals, your doctor will progress your diet to soft foods.  This diet usually lasts for 2-3 months, but can last longer depending on each individual. Soft foods are those which can be easily mashed with a fork.    Remember these principles:   No liquids with meals. Do no drink 30 minutes before meals and wait 30 minutes to 1 hour after meals to start drinking.   Sip on water, sugar-free beverages or non-fat milk throughout the day.  You will need to continue drinking at least 1 protein drink daily to meet protein needs.   100% fruit juice (no sugar added) is allowed, but limit to 4oz a day because it is high in calories and does not contain any protein.   Chew foods slowly; one meal should take 20-30 minutes.   Eat 3-5 meals per day, without any additional snacking.   Stop eating as soon as you feel full.   Avoid using table sugar and foods made with refined sugar, which can trigger dumping syndrome.   Marinating meats with a low sugar marinade, adding low-fat salad dressing, or adding low calorie gravy (made from powder and water) can help meats to digest easier.     Adding Vegetables and Fruits:    As long as you are consuming >80g total protein daily from combination of foods and protein drinks, you may start adding small bites of fruits and vegetables to your meals. Cooked, tender vegetables and ripe fruits without the peel are tolerated best.    Avoid fruit canned in syrup, sugary fruit juices, and vegetables cooked with oil, butter or sierra.  Bariatric SOFT Diet    EAT THESE FOODS AVOID THESE FOODS   High in Protein: High in Fat/Sugar:   ? Canned tuna or chicken (packed in water)  ? Lean ground turkey breast or ground round  ? Turkey or chicken (no skin); cooked tender and cut in small pieces  ? Lean pork or beef (cook in crock pot until very  tender; cut in small pieces  ? Scrambled, poached, or boiled eggs  ? Baked, broiled, grilled or boiled fish and seafood (not fried!)  ? Silken tofu, Edamame (soybeans)  ? Beans, hummus and lentils  ? Lean deli meats (turkey and chicken breast, ham, roast beef)  ? 1% or Skim Milk, Lactaid, or Soymilk  ? Low-fat or fat-free cottage cheese, soft cheese, mozzarella string cheese, or ricotta  ? Light yogurt, Greek yogurt, SF pudding High fat milk (whole, 2%)  Butter, margarine, oil, mayonnaise  Sour cream, cream cheese, salad dressing  Ice Cream  Cakes, cookies, pies, desserts  Candy  Luncheon meats (bologna, salami, chopped ham)  Sausage, [place]  Gravy  Fried Foods  ___________________________________  Tough/Crunchy--------------------------------  Tough or dry meats  Corn   Granola/cereal with nuts  Shredded Coconut    May add after 3 months:  Raw veggies  Lettuce  Plain, Unsalted Nuts and Seeds  Protein bars with 0-4 grams of sugar   As long as you are getting >80g PRO: Starchy Carbohydrates. At goal weight, some may include whole grains in small amounts.   Cooked tender vegetables without peel  Ripe fruits without peel  Frozen fruits with no added sugar  Fruit canned in its own juice or in water  Fat free, sugar free, frozen yogurt White and wheat Bread, Rice, Pasta   Cereals (including grits, oatmeal)   Crackers, Pretzels, Chips, Granola  Corn, Popcorn, Peas  White Potatoes, Sweet potatoes  Flour and corn tortillas     Fluids: Always Avoid:   Skim/1% milk, Lactaid, Soymilk  Water and Sugar-free beverages  (decaf and non-carbonated)  Decaf coffee & decaf tea  Sugary drinks  Carbonated drinks  Alcohol  Drinking through straws     Protein Content of Foods Recommended after                   Weight Loss Surgery    Food Name Portion Calories Protein (gms)   Almonds (unsalted) 1/4 cup 160 6   [place] milk, unsweetened 1 cup 30  1   Beef, Roast 1 oz 46 8   Beef, Steak, sirloin, trimmed 1 oz 55 9   Catfish, broiled or baked 1  oz 30 5   Cheese, American FF 1 oz 40 6   Cheese, Cottage 1% fat ¼ cup 41 7   Cheese, Parmesan, grated ¼ cup 128 12   Cheese, Mozzarella, part skim 1 oz 78 8   Cheese, part skim Ricotta ¼ cup 90 8   Chicken, white breast w/o skin 1 oz 46 9   Chicken, leg w/o skin 1 oz 54 7   Crab, steamed ¼ cup  40 9   Crawfish tails, boiled ¼ cup 35 8   Edamame, shelled ¼ cup 50 4   Egg 1 78 6   Ham, lean 5% 1 oz 44 7   Hamburger, lean 1 oz 56 7   Hummus ¼ cup 100 5   Lobster, steamed 1 oz 26 5   Milk, skim or 1%, soy  1 cup 90 8   Pork Tenderloin 1 oz 46 7   Pudding, SF 1 serv 60 2   Red beans ¼ cup 56 4   Refried beans, fat free ¼ cup 65 4   [place], baked 1 oz 52 7   Shrimp, steamed 1 oz 28 6   Soymilk, plain ½ cup 40 3   Tilapia, white fish, cooked 1 oz 36 8   Tofu ¼ cup 47 5   Trout 1 oz 48 7   Tuna, canned in water 1 oz 37 8   Turkey, white meat 1 oz 35 7   Veal Loin 1 oz 50 7   Yogurt, SF, frozen vanilla 3 oz 72 3.5   Yogurt, Fruit, FF, light 3 oz 40 2.5   Yogurt, Greek 3 oz 70 8     *Abbreviations: SF=sugar free, LF=low fat, FF= fat free, gms=grams  *3oz of cooked meat/protein = size of deck of cards or ladies palm   *1oz cheese = 1inch cube or 1 slice American cheese    Sample Menu for Bariatric Soft Diet  For Gastric Bypass and Sleeve            3 meals + 2 protein drinks  Remember: No drinking with meals.    Time of Day Day 1 Day 2   7am:    1 egg (or ¼ cup Egg Beaters) ¼ cup low-fat cottage cheese, 1 tbsp berries   8am: 1 cup water/SF beverage     9am: 1 cup water/SF beverage     10am:  Protein drink  Protein drink   11am: 1 cup water/SF beverage     12pm:    1-2 oz grilled shrimp, ¼ cup green beans   1-2oz canned chicken, shredded cheese, 1 tbsp salsa   1pm: 1 cup water/SF beverage     3pm:  Protein drink   Protein drink   4pm: 1 cup water/SF beverage     6pm:  ½ cup low fat chili, 1oz low-fat cheese, ¼ cup broccoli 2 oz grilled fish,  ¼  cup lima beans   7pm: 1 cup water/SF beverage       This sample menu provides  approx. 80g protein total, including about 40g protein from foods and at least 40g protein from protein drinks.  Drinking protein drinks daily helps decrease muscle loss, increase weight loss, and prevent hair loss.    ? Sip fluids continuously in between meals.    ? For fluids: 1 cup = 8 oz   ? For food: ¼ cup = 4 tablespoons = 1oz  ? No drinking from 30 minutes before meals to 30 minutes after meals.  ? 3oz meat is approx. the size of a deck of cards.    ? A food scale will help you determine portion size (Can be purchased at [employer])

## 2019-05-01 NOTE — PROGRESS NOTES
NUTRITION NOTE    Referring Physician: Jaime Coe M.D.  Reason for MNT Referral: Follow-up 4 Weeks s/p Gastric Sleeve    PAST MEDICAL HISTORY:  Denies nausea, vomiting, constipation and diarrhea.  Reports doing well.    Past Medical History:   Diagnosis Date    Acquired hypothyroidism 8/15/2018    Anemia     2016    Anxiety     Arthritis     Bipolar 1 disorder     Depression     Disorder of kidney and ureter     nephritis     Encounter for blood transfusion     2006, post spinal surgery     GERD (gastroesophageal reflux disease)     over 20 years    Hypertension     Hypothyroidism     Nontoxic multinodular goiter     Obesity     Pure hypercholesterolemia 8/15/2018    Type 2 diabetes mellitus without complication, without long-term current use of insulin 8/15/2018       CLINICAL DATA:  53 y.o. female.    There were no vitals filed for this visit.    Current Weight: 288 lbs  BMI: 35.74  Total Weight Loss: 24 lbs  Excess Weight Loss: 23%    LABS:  Reviewed. from last visit    CURRENT DIET:  Bariatric PureedDiet    Diet Recall:90-120grams of protein/day; 48-64 oz of fluids/day  Breakfast: Premier protein  And sometimes tomato juice  Lunch and Dinner: tuna or salmon or lean ground beef or egg     Diet Includes:  Meal Pattern: 2 meal(s) + 0 snack(s) + 2-3 protein supplement(s)  Adequate protein supplement intake.  Adequate dairy intake.  Adequate vegetable intake. Tried brussel sprouts, cucumber tomatoes, yellow squash, carrots, cauliflower  Adequate fruit intake. Applesauce, pear  Starchy CHO: none reported  Other: water, diet tea, tomato juice 32 ounces and bottle tea replace at least one of those per day    EXERCISE:  Adequate light exercise.  Foot pedal daily during show    Restrictions to Exercise: None.    VITAMINS/MINERALS:  Multivitamins: Bariatric Fusion 4 per day  B-Complex: Included with multivitamin. 12.5 mg  Calcium Citrate + Vitamin D: chews three times a day  Vitamin B12: in  mv    ASSESSMENT:  Doing well overall.  Adequate protein intake.  Adequate fluid intake.  Advancing diet appropriately.  Exercising.  Adequate vitamins & minerals.    BARIATRIC DIET DISCUSSION:  Instructed and provided written materials on bariatric soft diet plan.  Reinforced post-op nutrition guidelines.    PLAN / RECOMMENDATIONS:  Advance to bariatric soft diet.  Maintain protein intake.  Maintain fluid intake.  Continue light exercise.  Continue appropriate vitamins & minerals.      Return to clinic in 2 months.    SESSION TIME: 15 minutes

## 2019-05-01 NOTE — PROGRESS NOTES
BARIATRIC FOLLOW UP:    Chief Complaint   Patient presents with    Follow-up       HISTORY OF PRESENT ILLNESS: Tiffany Hendrix is a 53 y.o. female with a Body mass index is 35.74 kg/m². who presents for follow up s/p lap sleeve with Dr. Coe on 3/27/2019.  she is doing well and tolerating the diet without difficulty.  she is losing weight appropriately. Denies hypoglycemia.        Review of Systems   Constitutional: Negative for chills, fever and malaise/fatigue.   Eyes: Negative for blurred vision and double vision.   Respiratory: Negative for cough, shortness of breath and wheezing.    Cardiovascular: Negative for chest pain, palpitations and leg swelling.   Gastrointestinal: Negative for abdominal pain, blood in stool, constipation, diarrhea, heartburn, melena, nausea and vomiting.   Genitourinary: Negative for dysuria, frequency and urgency.   Musculoskeletal: Negative for back pain, joint pain, myalgias and neck pain.   Neurological: Negative for dizziness, tingling and headaches.   Psychiatric/Behavioral: Negative for depression and suicidal ideas. The patient is not nervous/anxious.      EXERCISE & VITAMINS:  Adherent with bariatric vitamin regimen. See RD note.  Bariatric Fusion, 4 a day, contains 12mg B1 total.  Exercise- stationary bike ( 1x daily)     MEDICATIONS/ALLERGIES:  Have been reviewed.    DIET:  Puree Bariatric Diet.   Adequate protein and water.    BF: protein shake   Tomato juice or water   Baby food rice cereal applesauce   Eggs with tuna     See dietitian's note from today for further details.     Vitals:    05/01/19 1013   BP: 106/70   Pulse: 69       Physical Exam   Constitutional: She is oriented to person, place, and time. She appears well-developed and well-nourished.   HENT:   Head: Normocephalic and atraumatic.   Eyes: Conjunctivae and EOM are normal.   Neck: Neck supple.   Cardiovascular: Normal rate, regular rhythm, normal heart sounds and intact distal pulses. Exam  reveals no gallop and no friction rub.   No murmur heard.  Pulmonary/Chest: Effort normal and breath sounds normal. No respiratory distress. She has no wheezes. She has no rales.   Abdominal: Soft. Bowel sounds are normal. She exhibits no distension and no mass. There is no tenderness. There is no rebound and no guarding. No hernia.   WHSS   Musculoskeletal: Normal range of motion. She exhibits no edema.   Neurological: She is alert and oriented to person, place, and time.   Skin: Skin is warm and dry.   Psychiatric: She has a normal mood and affect. Her behavior is normal.   Vitals reviewed.    ASSESSMENT:  - Morbid obesity, Body mass index is 35.74 kg/m².,  S/p  lap sleeve with Dr. Coe on 3/27/2019  - Estimated goal weight is 50% EWL  - Co-morbidities: DM type 2, HLD, HTN, anxiety/depression, CAMRON (no CPAP, none before surgery), GERD  - Good Weight loss, 23.8 lbs, 23% EWL  - Good  Exercise regimen  - Good Vitamin Regimen  - Good Diet    PLAN:  - Regular exercise and adherence to bariatric diet to achieve maximum weight loss.  - Follow-up with dietician to advance/re enforce diet.  - Full bariatric vitamin regimen  - Ursodiol 500 mg daily for 6 months for the gallbladder.  - Anti-Acid medication, on omeprazole, will stop and resume Protonix today.  - Lifting restriction, no lifting more than 10 lbs for 6 weeks total.  - Miralax daily for constipation, no fiber.  - No NSAIDs, Tylenol for pain.  - Can swallow whole pills- trial.  - RTC per post op schedule.  - Call the office for any issues.  - Check labs as scheduled.    20 minute visit, over 50% of time spent counseling patient face to face on diet, exercise, and weight loss.

## 2019-06-28 ENCOUNTER — LAB VISIT (OUTPATIENT)
Dept: LAB | Facility: HOSPITAL | Age: 54
End: 2019-06-28
Attending: SURGERY
Payer: COMMERCIAL

## 2019-06-28 ENCOUNTER — OFFICE VISIT (OUTPATIENT)
Dept: BARIATRICS | Facility: CLINIC | Age: 54
End: 2019-06-28
Payer: COMMERCIAL

## 2019-06-28 ENCOUNTER — CLINICAL SUPPORT (OUTPATIENT)
Dept: BARIATRICS | Facility: CLINIC | Age: 54
End: 2019-06-28
Payer: COMMERCIAL

## 2019-06-28 VITALS
WEIGHT: 216.69 LBS | HEART RATE: 68 BPM | SYSTOLIC BLOOD PRESSURE: 132 MMHG | HEIGHT: 67 IN | DIASTOLIC BLOOD PRESSURE: 82 MMHG | BODY MASS INDEX: 34.01 KG/M2

## 2019-06-28 DIAGNOSIS — K21.9 GASTROESOPHAGEAL REFLUX DISEASE, ESOPHAGITIS PRESENCE NOT SPECIFIED: ICD-10-CM

## 2019-06-28 DIAGNOSIS — R63.4 WEIGHT LOSS: ICD-10-CM

## 2019-06-28 DIAGNOSIS — I10 HYPERTENSION, UNSPECIFIED TYPE: ICD-10-CM

## 2019-06-28 DIAGNOSIS — E11.69 TYPE 2 DIABETES MELLITUS WITH OTHER SPECIFIED COMPLICATION, WITH LONG-TERM CURRENT USE OF INSULIN: ICD-10-CM

## 2019-06-28 DIAGNOSIS — E66.01 MORBID OBESITY: ICD-10-CM

## 2019-06-28 DIAGNOSIS — G47.33 OSA (OBSTRUCTIVE SLEEP APNEA): ICD-10-CM

## 2019-06-28 DIAGNOSIS — Z98.84 S/P LAPAROSCOPIC SLEEVE GASTRECTOMY: ICD-10-CM

## 2019-06-28 DIAGNOSIS — Z98.890 POSTOPERATIVE STATE: Primary | ICD-10-CM

## 2019-06-28 DIAGNOSIS — Z98.84 STATUS POST LAPAROSCOPIC SLEEVE GASTRECTOMY: ICD-10-CM

## 2019-06-28 DIAGNOSIS — Z79.4 TYPE 2 DIABETES MELLITUS WITH OTHER SPECIFIED COMPLICATION, WITH LONG-TERM CURRENT USE OF INSULIN: ICD-10-CM

## 2019-06-28 LAB
25(OH)D3+25(OH)D2 SERPL-MCNC: 44 NG/ML (ref 30–96)
ALBUMIN SERPL BCP-MCNC: 4 G/DL (ref 3.5–5.2)
ALP SERPL-CCNC: 85 U/L (ref 55–135)
ALT SERPL W/O P-5'-P-CCNC: 26 U/L (ref 10–44)
ANION GAP SERPL CALC-SCNC: 9 MMOL/L (ref 8–16)
AST SERPL-CCNC: 19 U/L (ref 10–40)
BASOPHILS # BLD AUTO: 0.02 K/UL (ref 0–0.2)
BASOPHILS NFR BLD: 0.2 % (ref 0–1.9)
BILIRUB SERPL-MCNC: 0.3 MG/DL (ref 0.1–1)
BUN SERPL-MCNC: 17 MG/DL (ref 6–20)
CALCIUM SERPL-MCNC: 10.2 MG/DL (ref 8.7–10.5)
CHLORIDE SERPL-SCNC: 106 MMOL/L (ref 95–110)
CHOLEST SERPL-MCNC: 127 MG/DL (ref 120–199)
CHOLEST/HDLC SERPL: 3.5 {RATIO} (ref 2–5)
CO2 SERPL-SCNC: 27 MMOL/L (ref 23–29)
CREAT SERPL-MCNC: 0.8 MG/DL (ref 0.5–1.4)
DIFFERENTIAL METHOD: ABNORMAL
EOSINOPHIL # BLD AUTO: 0.1 K/UL (ref 0–0.5)
EOSINOPHIL NFR BLD: 1.4 % (ref 0–8)
ERYTHROCYTE [DISTWIDTH] IN BLOOD BY AUTOMATED COUNT: 13.7 % (ref 11.5–14.5)
EST. GFR  (AFRICAN AMERICAN): >60 ML/MIN/1.73 M^2
EST. GFR  (NON AFRICAN AMERICAN): >60 ML/MIN/1.73 M^2
GLUCOSE SERPL-MCNC: 100 MG/DL (ref 70–110)
HCT VFR BLD AUTO: 46.1 % (ref 37–48.5)
HDLC SERPL-MCNC: 36 MG/DL (ref 40–75)
HDLC SERPL: 28.3 % (ref 20–50)
HGB BLD-MCNC: 14.6 G/DL (ref 12–16)
IMM GRANULOCYTES # BLD AUTO: 0.02 K/UL (ref 0–0.04)
IMM GRANULOCYTES NFR BLD AUTO: 0.2 % (ref 0–0.5)
IRON SERPL-MCNC: 59 UG/DL (ref 30–160)
LDLC SERPL CALC-MCNC: 67.6 MG/DL (ref 63–159)
LYMPHOCYTES # BLD AUTO: 2.9 K/UL (ref 1–4.8)
LYMPHOCYTES NFR BLD: 28.2 % (ref 18–48)
MCH RBC QN AUTO: 28.6 PG (ref 27–31)
MCHC RBC AUTO-ENTMCNC: 31.7 G/DL (ref 32–36)
MCV RBC AUTO: 90 FL (ref 82–98)
MONOCYTES # BLD AUTO: 0.6 K/UL (ref 0.3–1)
MONOCYTES NFR BLD: 6.1 % (ref 4–15)
NEUTROPHILS # BLD AUTO: 6.6 K/UL (ref 1.8–7.7)
NEUTROPHILS NFR BLD: 63.9 % (ref 38–73)
NONHDLC SERPL-MCNC: 91 MG/DL
NRBC BLD-RTO: 0 /100 WBC
PLATELET # BLD AUTO: 229 K/UL (ref 150–350)
PMV BLD AUTO: 12 FL (ref 9.2–12.9)
POTASSIUM SERPL-SCNC: 4.3 MMOL/L (ref 3.5–5.1)
PROT SERPL-MCNC: 7.5 G/DL (ref 6–8.4)
RBC # BLD AUTO: 5.1 M/UL (ref 4–5.4)
SATURATED IRON: 13 % (ref 20–50)
SODIUM SERPL-SCNC: 142 MMOL/L (ref 136–145)
TOTAL IRON BINDING CAPACITY: 469 UG/DL (ref 250–450)
TRANSFERRIN SERPL-MCNC: 317 MG/DL (ref 200–375)
TRIGL SERPL-MCNC: 117 MG/DL (ref 30–150)
VIT B12 SERPL-MCNC: 717 PG/ML (ref 210–950)
WBC # BLD AUTO: 10.24 K/UL (ref 3.9–12.7)

## 2019-06-28 PROCEDURE — 82306 VITAMIN D 25 HYDROXY: CPT

## 2019-06-28 PROCEDURE — 85025 COMPLETE CBC W/AUTO DIFF WBC: CPT

## 2019-06-28 PROCEDURE — 84425 ASSAY OF VITAMIN B-1: CPT

## 2019-06-28 PROCEDURE — 99499 UNLISTED E&M SERVICE: CPT | Mod: S$GLB,,, | Performed by: DIETITIAN, REGISTERED

## 2019-06-28 PROCEDURE — 99999 PR PBB SHADOW E&M-EST. PATIENT-LVL III: CPT | Mod: PBBFAC,,, | Performed by: NURSE PRACTITIONER

## 2019-06-28 PROCEDURE — 99024 POSTOP FOLLOW-UP VISIT: CPT | Mod: S$GLB,,, | Performed by: NURSE PRACTITIONER

## 2019-06-28 PROCEDURE — 80053 COMPREHEN METABOLIC PANEL: CPT

## 2019-06-28 PROCEDURE — 83540 ASSAY OF IRON: CPT

## 2019-06-28 PROCEDURE — 99999 PR PBB SHADOW E&M-EST. PATIENT-LVL III: ICD-10-PCS | Mod: PBBFAC,,, | Performed by: NURSE PRACTITIONER

## 2019-06-28 PROCEDURE — 80061 LIPID PANEL: CPT

## 2019-06-28 PROCEDURE — 99499 NO LOS: ICD-10-PCS | Mod: S$GLB,,, | Performed by: DIETITIAN, REGISTERED

## 2019-06-28 PROCEDURE — 82607 VITAMIN B-12: CPT

## 2019-06-28 PROCEDURE — 99024 PR POST-OP FOLLOW-UP VISIT: ICD-10-PCS | Mod: S$GLB,,, | Performed by: NURSE PRACTITIONER

## 2019-06-28 RX ORDER — METOPROLOL TARTRATE 25 MG/1
TABLET, FILM COATED ORAL
Refills: 11 | COMMUNITY
Start: 2019-06-08 | End: 2020-09-04

## 2019-06-28 NOTE — PROGRESS NOTES
BARIATRIC FOLLOW UP:    Chief Complaint   Patient presents with    Follow-up     HISTORY OF PRESENT ILLNESS: Tiffany Hendrix is a 53 y.o. female with a Body mass index is 33.94 kg/m². who presents for follow up s/p lap sleeve with Dr. Coe on 3/27/2019.  she is doing well and tolerating the diet without difficulty.  she is losing weight appropriately. Denies hypoglycemia. On PPI per GI, does not want to taper because of GERD without talking to her GI. Denies h/o Barrets or ulcers.    Review of Systems   Constitutional: Negative for chills, fever and malaise/fatigue.   Eyes: Negative for blurred vision and double vision.   Respiratory: Negative for cough, shortness of breath and wheezing.    Cardiovascular: Negative for chest pain and palpitations.   Gastrointestinal: Negative for abdominal pain, blood in stool, constipation, diarrhea, heartburn, melena, nausea and vomiting.   Musculoskeletal: Positive for back pain. Negative for joint pain, myalgias and neck pain.   Neurological: Negative for dizziness, tingling and headaches.   Psychiatric/Behavioral: Negative for depression and suicidal ideas. The patient is not nervous/anxious.      EXERCISE & VITAMINS:  Adherent with bariatric vitamin regimen. Takes Bariatric Fusion, 2 tabs, twice daily.  Exercise- walks 3 miles a day.    MEDICATIONS/ALLERGIES:  Have been reviewed.    DIET:  Soft Ivan diet   gm protein daily  64+ oz water daily  See dietitian's note from today for further details.     Vitals:    06/28/19 0932   BP: 132/82   Pulse: 68       Physical Exam   Constitutional: She is oriented to person, place, and time. She appears well-developed and well-nourished.   HENT:   Head: Normocephalic and atraumatic.   Eyes: Conjunctivae and EOM are normal.   Neck: Neck supple.   Cardiovascular: Normal rate, regular rhythm, normal heart sounds and intact distal pulses. Exam reveals no gallop and no friction rub.   No murmur heard.  Pulmonary/Chest: Effort  normal and breath sounds normal. No respiratory distress. She has no wheezes. She has no rales.   Abdominal: Soft. Bowel sounds are normal. She exhibits no distension and no mass. There is no tenderness. There is no rebound and no guarding. No hernia.   WHSS   Musculoskeletal: Normal range of motion. She exhibits no edema.   Neurological: She is alert and oriented to person, place, and time.   Skin: Skin is warm and dry.   Psychiatric: She has a normal mood and affect. Her behavior is normal.   Vitals reviewed.      ASSESSMENT:  - Morbid obesity, Body mass index is 33.94 kg/m².,  S/p  lap sleeve with Dr. Coe on 3/27/2019  - Estimated goal weight is 50% EWL  - Co-morbidities: DM type 2, HLD, HTN, anxiety/depression, CAMRON (no CPAP, none before surgery), GERD  - Good Weight loss, 36 lbs, 34% EWL  - Good  Exercise regimen  - Good Vitamin Regimen  - Good Diet    PLAN:  - Regular exercise and adherence to bariatric diet to achieve maximum weight loss.  - Follow-up with dietician to advance/re enforce diet.  - Full bariatric vitamin regimen  - Ursodiol 500 mg daily for 6 months for the gallbladder.  - Anti-Acid medication - she agrees to f/u with GI  - Miralax daily for constipation, no fiber.  - No NSAIDs, Tylenol for pain.  - RTC per post op schedule.  - Call the office for any issues.  - Check labs as scheduled.    20 minute visit, over 50% of time spent counseling patient face to face on diet, exercise, and weight loss.

## 2019-06-28 NOTE — PROGRESS NOTES
NUTRITION NOTE    Referring Physician: Jaime Coe M.D.  Reason for MNT Referral: Follow-up 3 months s/p Gastric Sleeve    PAST MEDICAL HISTORY:    Denies nausea, vomiting, constipation and diarrhea.  Reports doing well.    Past Medical History:   Diagnosis Date    Acquired hypothyroidism 8/15/2018    Anemia     2016    Anxiety     Arthritis     Bipolar 1 disorder     Depression     Disorder of kidney and ureter     nephritis     Encounter for blood transfusion     2006, post spinal surgery     GERD (gastroesophageal reflux disease)     over 20 years    Hypertension     Hypothyroidism     Nontoxic multinodular goiter     Obesity     Pure hypercholesterolemia 8/15/2018    Type 2 diabetes mellitus without complication, without long-term current use of insulin 8/15/2018       CLINICAL DATA:  53 y.o. female.    There were no vitals filed for this visit.    Current Weight: 217 lbs  BMI: 33.94  Total Weight Loss: 36 lbs  Excess Weight Loss: 34%    LABS:  Reviewed.  Only CBC available at time of visit    CURRENT DIET:  Bariatric Diet.  Diet Recall: 100-120 grams of protein/day;  74 oz of fluids/day  Premier Protein (30 gms) and skim milk (8 gms)  and protein powder mixed together per pt that provides a total of  100 gms of protein that she splits into 2 servings  2-3 meal/snacks  Meat and veggies      Meal Pattern: 2-3 meal(s) +0 snack(s) + 2 protein supplement(s)  Excess protein supplement intake.  Recommend pt split shake into 30-40 gms of protein servings and use 2 per day with 3 small high protein meals  Adequate dairy intake.  Adequate vegetable intake. Tolerates raw vegetables and lettuce. Tomatoes and cucumbers  Adequate fruit intake.  Starchy CHO: none reported  Other: tomato juice, diet tea-16-20 ounces, water 2 cans of tomato juice-24 bottles and 2 bottles of water    EXERCISE:  Adequate light exercise.  Walk three days     Restrictions to Exercise: Pain.    VITAMINS / MINERALS:  Pt uses  Bariatric Fusion All in One which pt reports has iron, thiamin, calcium and b-12 all in the multivitamin.  Discussed that calcium and iron should be taken separately for better absorption.    ASSESSMENT:  Doing fairly well overall.  Weight loss.  Adequate calorie intake.  Adequate protein intake.  Adequate fluid intake.  Following diet appropriately.  Exercising.  Adequate vitamins & minerals.    BARIATRIC DIET DISCUSSION:  Instructed and provided written materials on bariatric diet plan.  Reinforced post-op nutrition guidelines.    PLAN / RECOMMENDATIONS:  May begin to incorporate raw vegetables, lettuce, unsalted nuts, and light popcorn as tolerated.  May begin to swallow whole pills as tolerated.  Continue excellent diet plan.  Adjust diet by taking in 30-40 gms of protein in 1 shake.  Limit shakes to 2 per day and use protein at meals to continue to get  gms of protein in per day  Maintain protein intake.  Maintain fluid intake.  Continue exercise.  Continue appropriate vitamins & minerals.  Adjust vitamins & minerals by taking separate calcium supplement.    Return to clinic in 3 months.    SESSION TIME: 15 minutes

## 2019-07-02 LAB — VIT B1 BLD-MCNC: 80 UG/L (ref 38–122)

## 2019-07-08 RX ORDER — URSODIOL 500 MG/1
TABLET, FILM COATED ORAL
Qty: 90 TABLET | Refills: 0 | Status: SHIPPED | OUTPATIENT
Start: 2019-07-08 | End: 2019-08-23

## 2019-07-12 ENCOUNTER — TELEPHONE (OUTPATIENT)
Dept: BARIATRICS | Facility: CLINIC | Age: 54
End: 2019-07-12

## 2019-07-12 NOTE — TELEPHONE ENCOUNTER
Bariatric nutrition  Called to discuss low Iron from labs 6/28/19. Pt states she has dealt with low Iron in the past and had to get injections. She will call her MD close to home and work with them on this. Current vit regimen includes Bariatric Fusion (all in one MV, Iron, Ca supplement). Reviewed bariatric recommendations to separate Iron and Ca supplementation by 2 hrs for best absorption. Suggestions for vit/min regimen in bariatric nutrition guidebook.

## 2019-07-12 NOTE — TELEPHONE ENCOUNTER
----- Message from Meka Sweeney RN sent at 7/3/2019  5:07 PM CDT -----      ----- Message -----  From: Jaime Coe MD  Sent: 7/1/2019   5:18 PM  To: Saravanan Ritter Staff    Iron remains low.  Please make sure she is taking a daily mvi with iron.

## 2019-08-06 ENCOUNTER — TELEPHONE (OUTPATIENT)
Dept: INTERNAL MEDICINE | Facility: CLINIC | Age: 54
End: 2019-08-06

## 2019-08-07 RX ORDER — DULOXETIN HYDROCHLORIDE 30 MG/1
30 CAPSULE, DELAYED RELEASE ORAL DAILY
Qty: 30 CAPSULE | Refills: 2 | Status: SHIPPED | OUTPATIENT
Start: 2019-08-07 | End: 2019-08-23 | Stop reason: SDUPTHER

## 2019-08-16 RX ORDER — BUPROPION HYDROCHLORIDE 100 MG/1
TABLET ORAL
Qty: 90 TABLET | Refills: 1 | Status: SHIPPED | OUTPATIENT
Start: 2019-08-16 | End: 2019-08-23 | Stop reason: SDUPTHER

## 2019-08-23 ENCOUNTER — OFFICE VISIT (OUTPATIENT)
Dept: PSYCHIATRY | Facility: CLINIC | Age: 54
End: 2019-08-23
Payer: COMMERCIAL

## 2019-08-23 VITALS
HEIGHT: 67 IN | SYSTOLIC BLOOD PRESSURE: 122 MMHG | WEIGHT: 205.25 LBS | HEART RATE: 72 BPM | BODY MASS INDEX: 32.21 KG/M2 | DIASTOLIC BLOOD PRESSURE: 76 MMHG | RESPIRATION RATE: 18 BRPM

## 2019-08-23 DIAGNOSIS — F41.9 ANXIETY: Primary | ICD-10-CM

## 2019-08-23 DIAGNOSIS — F32.89 OTHER DEPRESSION: ICD-10-CM

## 2019-08-23 PROCEDURE — 99999 PR PBB SHADOW E&M-EST. PATIENT-LVL III: ICD-10-PCS | Mod: PBBFAC,,, | Performed by: PSYCHIATRY & NEUROLOGY

## 2019-08-23 PROCEDURE — 99214 OFFICE O/P EST MOD 30 MIN: CPT | Mod: S$GLB,,, | Performed by: PSYCHIATRY & NEUROLOGY

## 2019-08-23 PROCEDURE — 99214 PR OFFICE/OUTPT VISIT, EST, LEVL IV, 30-39 MIN: ICD-10-PCS | Mod: S$GLB,,, | Performed by: PSYCHIATRY & NEUROLOGY

## 2019-08-23 PROCEDURE — 99999 PR PBB SHADOW E&M-EST. PATIENT-LVL III: CPT | Mod: PBBFAC,,, | Performed by: PSYCHIATRY & NEUROLOGY

## 2019-08-23 RX ORDER — CYCLOBENZAPRINE HCL 10 MG
10 TABLET ORAL DAILY PRN
Refills: 3 | COMMUNITY
Start: 2019-08-13 | End: 2020-02-11

## 2019-08-23 RX ORDER — LAMOTRIGINE 100 MG/1
100 TABLET ORAL DAILY
Qty: 90 TABLET | Refills: 1 | Status: SHIPPED | OUTPATIENT
Start: 2019-08-23 | End: 2020-03-13

## 2019-08-23 RX ORDER — BUPROPION HYDROCHLORIDE 100 MG/1
100 TABLET ORAL DAILY
Qty: 90 TABLET | Refills: 1 | Status: SHIPPED | OUTPATIENT
Start: 2019-08-23 | End: 2020-03-02 | Stop reason: SDUPTHER

## 2019-08-23 RX ORDER — DULOXETIN HYDROCHLORIDE 30 MG/1
30 CAPSULE, DELAYED RELEASE ORAL DAILY
Qty: 90 CAPSULE | Refills: 1 | Status: SHIPPED | OUTPATIENT
Start: 2019-08-23 | End: 2020-04-24

## 2019-08-23 NOTE — PROGRESS NOTES
"Outpatient Psychiatry Follow up(MD/NP)    8/23/2019    Tiffany Hendrix, a 54 y.o. female, presenting for initial evaluation visit. Met with patient.    Reason for Encounter: follow up*. Patient complains of   Chief Complaint   Patient presents with    Anxiety    Depression   .    History of Present Illness:     Patient was seen and examined. Her chart was reviewed. Reviewed notes by GARRY Ritter Jr., MD at 3/28/2019  8:28 AM; Debbie Chavarria RD at 4/10/2019 10:02 AM; SARAH Roberson at 6/28/2019  3:05 PM;  And Tracee Breaux MD at 7/11/2019 10:15 AM     She has been compliant with treatment. She denied ay side effects. She reports good efficacy and tolerability. She tolerated the decreased dosage of Cymbalta and Wellbutrin without any complications or worsening of symptoms.     She had one new-medical stressors since last seen. She previously had knee replacement surgery on her right knee- she rehabed and recovered well. She had shoulder surger which went well; she is recovering well. She had her gastric procedure as well- this went well and she is recovering well.     No new psychosocial stressors have occurred since last seen. Family and marriage remain stable and supportive. Her children are doing well.     "I've been great.. Just need refills." Symptoms remain in remission since last seen as documented below.     Denied Symptoms of Depression: no diminished mood or loss of interest/anhedonia; no irritability, diminished energy, change in sleep, change in appetite, diminished concentration or cognition or indecisiveness, PMA/R, excessive guilt or hopelessness or worthlessness, or suicidal ideations; no anxious features; no episodes that lasted more than 2 weeks or more at a time- episodes last a few days at a time    Denied issues with Sleep: no issues with initiation, maintenance, early morning awakening with inability to return to sleep, or hypersomnolence     Denied Suicidal/Homicidal ideations: no " "active/passive ideations, organized plans, or future intentions    Denied Symptoms of psychosis: no hallucinations, delusions, disorganized thinking, disorganized behavior or abnormal motor behavior, or negative symptoms     Denied past or current Symptoms of aron or hypomania: no elevated, expansive, or irritable mood with increased energy or activity; with no inflated self-esteem or grandiosity, decreased need for sleep, increased rate of speech, FOI or racing thoughts, distractibility, increased goal directed activity or PMA, or risky/disinhibited behavior    Weight problems- current to 205 today, was at 246 lbs last visit; she continues with weight loss medications at this time; She obtained the A gastric sleave and saw a dietician. The procedure is potentially going to occur in the near furute. She continues to make steps towards this goal. She has been compliant with the supplements.     She reports that she has not used nicotine since 1/21/19; she has found wellbutrin to be helpful.     A comprehensive ROS was negative aside from slight shoulder pain form surgery      .  Current Evaluation:     Nutritional Screening: Considering the patient's height and weight, medications, medical history and preferences, should a referral be made to the dietitian? no    Constitutional  Vitals:  Most recent vital signs, dated less than 90 days prior to this appointment, were reviewed.    Vitals:    08/23/19 1058   BP: 122/76   Pulse: 72   Resp: 18   Weight: 93.1 kg (205 lb 4 oz)   Height: 5' 7" (1.702 m)     Body mass index is 32.15 kg/m².       General:  unremarkable, age appropriate     Musculoskeletal  Muscle Strength/Tone:  not examined, no dyskinesia, no dystonia, no tremor, no tic   Gait & Station:  non-ataxic     Psychiatric  Speech:  no latency; no press   Mood & Affect:  euthymic "good"  congruent and appropriate, full, bright   Thought Process:  normal and logical   Associations:  intact   Thought Content:  " normal, no suicidality, no homicidality, delusions, or paranoia   Insight:  intact, has awareness of illness   Judgement: behavior is adequate to circumstances   Orientation:  grossly intact, person, place, situation, time/date, day of week, month of year, year   Memory: intact for content of interview, able to remember recent events- yes, able to remember remote events- yes   Language: grossly intact, able to name, able to repeat   Attention Span & Concentration:  able to focus, completed tasks   Fund of Knowledge:  intact and appropriate to age and level of education       Relevant Elements of Neurological Exam: normal gait    Laboratory Data  No visits with results within 1 Month(s) from this visit.   Latest known visit with results is:   Lab Visit on 07/11/2019   Component Date Value Ref Range Status    TSH 07/11/2019 1.95  0.46 - 4.68 mIU/L Final         Medications  Outpatient Encounter Medications as of 8/23/2019   Medication Sig Dispense Refill    aspirin (ECOTRIN) 81 MG EC tablet Take 81 mg by mouth once daily.      benzoyl peroxide-erythromycin (BENZAMYCIN) gel Apply to affected area every day at bedtime  2    buPROPion (WELLBUTRIN) 100 MG tablet TAKE 1 TABLET BY MOUTH EVERY DAY 90 tablet 1    cyclobenzaprine (FLEXERIL) 10 MG tablet Take 10 mg by mouth daily as needed.  3    DULoxetine (CYMBALTA) 30 MG capsule Take 1 capsule (30 mg total) by mouth once daily. 30 capsule 2    INV TESTOSTERONE/ANASTROZOL OR PLACEBO PELLETS Inject 1 Pellet into the skin. For investigational use only.      lamoTRIgine (LAMICTAL) 100 MG tablet Take 1 tablet (100 mg total) by mouth once daily. 90 tablet 0    levothyroxine (SYNTHROID) 125 MCG tablet Take 1 tablet (125 mcg total) by mouth once daily. 90 tablet 3    m-vit,tx,iron,mins/calc/folic (MULTIVITAMIN) Tab Take 2 tablets by mouth 2 (two) times daily.      metoprolol tartrate (LOPRESSOR) 25 MG tablet TAKE 1 TABLET ORALLY 2 TIMES A DAY. (REPLACES TOPROL XL 50MG)   11    ONETOUCH ULTRA BLUE TEST STRIP Strp CHECK BLOOD SUGAR ONCE A DAY  3    ONETOUCH ULTRA BLUE TEST STRIP Strp Inject 1 each into the skin once daily. 100 strip 3    pantoprazole (PROTONIX) 40 MG tablet Take 40 mg by mouth once daily.      rosuvastatin (CRESTOR) 20 MG tablet Take 20 mg by mouth every evening.       [DISCONTINUED] ursodiol (ACTIGALL) 500 MG tablet CRUSH AND TAKE 1 TABLET BY MOUTH DAILY FOR GALL BLADDER. 90 tablet 0     No facility-administered encounter medications on file as of 8/23/2019.            Assessment - Diagnosis - Goals:     Unspecified Depressive Disorder  Unspecified Anxiety Disorder    Morbid obesity   Nicotine Dependence     Treatment Plan/Recommendations:       Medications:  Continue Bupropion  mg po q day- for depression and nicotine cessation  Continue Cymbalta 30 mg po q AM for depression/anxiety/chronic pain  Continue Lamictal 100 mg po q day for adjunctive depression/chronic pain  Stopped naltrexone- no longer needingy    Discussed diagnosis, risks and benefits of proposed treatment vs alternative treatments vs no treatment, and potential side effects of these treatments.  The patient expresses understanding of the above and displays the capacity to agree with this treatment given said understanding.  Patient also agrees that, currently, the benefits outweigh the risks and would like to pursue treatment at this time.    We discussed risk of relapse with lower dosages- she was counseled on s/s of relapse. She and her  will be on guard fo rthem and resume previous dosages and call the clinic if symptoms return.     Therapy:   Not indicated at this time; will refer if/when needed    Counseling:  Counseled on diet and exercise for weight loss/obesity    Return to Clinic: 6 months, sooner if needed    Jey Og MD  Psychiatry

## 2019-09-26 ENCOUNTER — OFFICE VISIT (OUTPATIENT)
Dept: BARIATRICS | Facility: CLINIC | Age: 54
End: 2019-09-26
Payer: COMMERCIAL

## 2019-09-26 ENCOUNTER — LAB VISIT (OUTPATIENT)
Dept: LAB | Facility: HOSPITAL | Age: 54
End: 2019-09-26
Attending: SURGERY
Payer: COMMERCIAL

## 2019-09-26 VITALS
WEIGHT: 200.38 LBS | SYSTOLIC BLOOD PRESSURE: 114 MMHG | DIASTOLIC BLOOD PRESSURE: 61 MMHG | BODY MASS INDEX: 31.45 KG/M2 | HEART RATE: 77 BPM | HEIGHT: 67 IN

## 2019-09-26 DIAGNOSIS — E11.69 TYPE 2 DIABETES MELLITUS WITH OTHER SPECIFIED COMPLICATION, WITH LONG-TERM CURRENT USE OF INSULIN: ICD-10-CM

## 2019-09-26 DIAGNOSIS — K21.9 GASTROESOPHAGEAL REFLUX DISEASE, ESOPHAGITIS PRESENCE NOT SPECIFIED: ICD-10-CM

## 2019-09-26 DIAGNOSIS — E66.01 MORBID OBESITY: ICD-10-CM

## 2019-09-26 DIAGNOSIS — I10 HYPERTENSION, UNSPECIFIED TYPE: ICD-10-CM

## 2019-09-26 DIAGNOSIS — R63.4 WEIGHT LOSS: ICD-10-CM

## 2019-09-26 DIAGNOSIS — G47.33 OSA (OBSTRUCTIVE SLEEP APNEA): ICD-10-CM

## 2019-09-26 DIAGNOSIS — Z98.84 STATUS POST LAPAROSCOPIC SLEEVE GASTRECTOMY: ICD-10-CM

## 2019-09-26 DIAGNOSIS — Z79.4 TYPE 2 DIABETES MELLITUS WITH OTHER SPECIFIED COMPLICATION, WITH LONG-TERM CURRENT USE OF INSULIN: ICD-10-CM

## 2019-09-26 DIAGNOSIS — Z98.84 S/P LAPAROSCOPIC SLEEVE GASTRECTOMY: Primary | ICD-10-CM

## 2019-09-26 DIAGNOSIS — Z71.9 HEALTH COUNSELING: ICD-10-CM

## 2019-09-26 LAB
25(OH)D3+25(OH)D2 SERPL-MCNC: 41 NG/ML (ref 30–96)
ALBUMIN SERPL BCP-MCNC: 3.9 G/DL (ref 3.5–5.2)
ALP SERPL-CCNC: 88 U/L (ref 55–135)
ALT SERPL W/O P-5'-P-CCNC: 23 U/L (ref 10–44)
ANION GAP SERPL CALC-SCNC: 9 MMOL/L (ref 8–16)
AST SERPL-CCNC: 17 U/L (ref 10–40)
BASOPHILS # BLD AUTO: 0.02 K/UL (ref 0–0.2)
BASOPHILS NFR BLD: 0.2 % (ref 0–1.9)
BILIRUB SERPL-MCNC: 0.4 MG/DL (ref 0.1–1)
BUN SERPL-MCNC: 10 MG/DL (ref 6–20)
CALCIUM SERPL-MCNC: 9.8 MG/DL (ref 8.7–10.5)
CHLORIDE SERPL-SCNC: 105 MMOL/L (ref 95–110)
CHOLEST SERPL-MCNC: 165 MG/DL (ref 120–199)
CHOLEST/HDLC SERPL: 3.6 {RATIO} (ref 2–5)
CO2 SERPL-SCNC: 26 MMOL/L (ref 23–29)
CREAT SERPL-MCNC: 0.8 MG/DL (ref 0.5–1.4)
DIFFERENTIAL METHOD: ABNORMAL
EOSINOPHIL # BLD AUTO: 0.2 K/UL (ref 0–0.5)
EOSINOPHIL NFR BLD: 2.1 % (ref 0–8)
ERYTHROCYTE [DISTWIDTH] IN BLOOD BY AUTOMATED COUNT: 14.8 % (ref 11.5–14.5)
EST. GFR  (AFRICAN AMERICAN): >60 ML/MIN/1.73 M^2
EST. GFR  (NON AFRICAN AMERICAN): >60 ML/MIN/1.73 M^2
GLUCOSE SERPL-MCNC: 98 MG/DL (ref 70–110)
HCT VFR BLD AUTO: 44.2 % (ref 37–48.5)
HDLC SERPL-MCNC: 46 MG/DL (ref 40–75)
HDLC SERPL: 27.9 % (ref 20–50)
HGB BLD-MCNC: 14.6 G/DL (ref 12–16)
IMM GRANULOCYTES # BLD AUTO: 0.03 K/UL (ref 0–0.04)
IMM GRANULOCYTES NFR BLD AUTO: 0.3 % (ref 0–0.5)
IRON SERPL-MCNC: 104 UG/DL (ref 30–160)
LDLC SERPL CALC-MCNC: 80 MG/DL (ref 63–159)
LYMPHOCYTES # BLD AUTO: 2.3 K/UL (ref 1–4.8)
LYMPHOCYTES NFR BLD: 25.2 % (ref 18–48)
MCH RBC QN AUTO: 29.3 PG (ref 27–31)
MCHC RBC AUTO-ENTMCNC: 33 G/DL (ref 32–36)
MCV RBC AUTO: 89 FL (ref 82–98)
MONOCYTES # BLD AUTO: 0.5 K/UL (ref 0.3–1)
MONOCYTES NFR BLD: 5 % (ref 4–15)
NEUTROPHILS # BLD AUTO: 6.1 K/UL (ref 1.8–7.7)
NEUTROPHILS NFR BLD: 67.2 % (ref 38–73)
NONHDLC SERPL-MCNC: 119 MG/DL
NRBC BLD-RTO: 0 /100 WBC
PLATELET # BLD AUTO: 216 K/UL (ref 150–350)
PMV BLD AUTO: 11.3 FL (ref 9.2–12.9)
POTASSIUM SERPL-SCNC: 4.3 MMOL/L (ref 3.5–5.1)
PROT SERPL-MCNC: 7.2 G/DL (ref 6–8.4)
RBC # BLD AUTO: 4.99 M/UL (ref 4–5.4)
SATURATED IRON: 26 % (ref 20–50)
SODIUM SERPL-SCNC: 140 MMOL/L (ref 136–145)
TOTAL IRON BINDING CAPACITY: 406 UG/DL (ref 250–450)
TRANSFERRIN SERPL-MCNC: 274 MG/DL (ref 200–375)
TRIGL SERPL-MCNC: 195 MG/DL (ref 30–150)
VIT B12 SERPL-MCNC: 511 PG/ML (ref 210–950)
WBC # BLD AUTO: 9.03 K/UL (ref 3.9–12.7)

## 2019-09-26 PROCEDURE — 82306 VITAMIN D 25 HYDROXY: CPT

## 2019-09-26 PROCEDURE — 99213 OFFICE O/P EST LOW 20 MIN: CPT | Mod: S$GLB,,, | Performed by: NURSE PRACTITIONER

## 2019-09-26 PROCEDURE — 82607 VITAMIN B-12: CPT

## 2019-09-26 PROCEDURE — 80053 COMPREHEN METABOLIC PANEL: CPT

## 2019-09-26 PROCEDURE — 99213 PR OFFICE/OUTPT VISIT, EST, LEVL III, 20-29 MIN: ICD-10-PCS | Mod: S$GLB,,, | Performed by: NURSE PRACTITIONER

## 2019-09-26 PROCEDURE — 99999 PR PBB SHADOW E&M-EST. PATIENT-LVL III: ICD-10-PCS | Mod: PBBFAC,,, | Performed by: NURSE PRACTITIONER

## 2019-09-26 PROCEDURE — 83540 ASSAY OF IRON: CPT

## 2019-09-26 PROCEDURE — 85025 COMPLETE CBC W/AUTO DIFF WBC: CPT

## 2019-09-26 PROCEDURE — 99999 PR PBB SHADOW E&M-EST. PATIENT-LVL III: CPT | Mod: PBBFAC,,, | Performed by: NURSE PRACTITIONER

## 2019-09-26 PROCEDURE — 80061 LIPID PANEL: CPT

## 2019-09-26 PROCEDURE — 84425 ASSAY OF VITAMIN B-1: CPT

## 2019-09-26 PROCEDURE — 36415 COLL VENOUS BLD VENIPUNCTURE: CPT

## 2019-09-26 NOTE — PROGRESS NOTES
BARIATRIC FOLLOW UP:    Chief Complaint   Patient presents with    Follow-up     6 month f/u      HISTORY OF PRESENT ILLNESS: Tiffany Hendrix is a 54 y.o. female with a Body mass index is 31.39 kg/m². who presents for follow up s/p lap sleeve with Dr. Coe on 3/27/2019.  she is doing well and tolerating the diet without difficulty.  she is losing weight appropriately. Denies hypoglycemia. On PPI per GI, decided not to taper because of GERD until talking to her GI. Denies h/o Barrets or ulcers.  Had rotator cuff surgery in August- in [facility].    Review of Systems   Constitutional: Negative for chills, fever and malaise/fatigue.   Eyes: Negative for blurred vision and double vision.   Respiratory: Negative for cough, shortness of breath and wheezing.    Cardiovascular: Negative for chest pain and palpitations.   Gastrointestinal: Negative for abdominal pain, blood in stool, constipation, diarrhea, heartburn, melena, nausea and vomiting.   Musculoskeletal: Negative for back pain, joint pain, myalgias and neck pain.   Neurological: Negative for dizziness, tingling and headaches.   Psychiatric/Behavioral: Negative for depression and suicidal ideas. The patient is not nervous/anxious.      EXERCISE & VITAMINS:  Adherent with bariatric vitamin regimen. Takes Bariatric Fusion, 2 tabs, twice daily.  Exercise- on hold s/p rotator cuff surgery    DIET:  Reg Ivan diet  ~70 gm protein daily  64+ oz water daily  BF Premier pro shake  CHULA 2-3 oz animal pro, veg, but sometimes pro shake  DIN 2-3 oz meal, veg  Sn 1 serving fruit    Vitals:    09/26/19 1027   BP: 114/61   Pulse: 77       Physical Exam   Constitutional: She is oriented to person, place, and time. She appears well-developed and well-nourished.   HENT:   Head: Normocephalic and atraumatic.   Eyes: Conjunctivae and EOM are normal.   Neck: Neck supple.   Cardiovascular: Normal rate, regular rhythm, normal heart sounds and intact distal pulses. Exam reveals no  gallop and no friction rub.   No murmur heard.  Pulmonary/Chest: Effort normal and breath sounds normal. No respiratory distress. She has no wheezes. She has no rales.   Abdominal: Soft. Bowel sounds are normal. She exhibits no distension and no mass. There is no tenderness. There is no rebound and no guarding. No hernia.   WHSS   Musculoskeletal: Normal range of motion. She exhibits no edema.   Neurological: She is alert and oriented to person, place, and time.   Skin: Skin is warm and dry.   Psychiatric: She has a normal mood and affect. Her behavior is normal.   Vitals reviewed.      ASSESSMENT:  - Morbid obesity, Body mass index is 31.39 kg/m².,  S/p  lap sleeve with Dr. Coe on 3/27/2019  - Estimated goal weight is 50% EWL  - Co-morbidities: DM type 2, HLD, HTN, anxiety/depression, CAMRON (no CPAP, none before surgery), GERD  - Good Weight loss, 52 lbs, 50% EWL  - Good  Exercise regimen  - Good Vitamin Regimen  - Good Diet    PLAN:  - Regular exercise and adherence to bariatric diet to achieve maximum weight loss.  - Would like to lose additional wt. Discussed increased protein.  - Full bariatric vitamin regimen  - Ursodiol complete at 6 months, no refills.  - Miralax daily for constipation, no fiber.  - No NSAIDs, Tylenol for pain.  - RTC per post op schedule.  - Call the office for any issues.  - Check labs as scheduled.    30 minute visit, over 50% of time spent counseling patient face to face on diet, exercise, and weight loss.

## 2019-10-01 LAB — VIT B1 BLD-MCNC: 88 UG/L (ref 38–122)

## 2020-03-02 ENCOUNTER — PATIENT MESSAGE (OUTPATIENT)
Dept: PSYCHIATRY | Facility: CLINIC | Age: 55
End: 2020-03-02

## 2020-03-02 RX ORDER — BUPROPION HYDROCHLORIDE 100 MG/1
100 TABLET ORAL 2 TIMES DAILY
Qty: 180 TABLET | Refills: 0 | Status: SHIPPED | OUTPATIENT
Start: 2020-03-02 | End: 2020-05-04

## 2020-03-13 RX ORDER — LAMOTRIGINE 100 MG/1
TABLET ORAL
Qty: 90 TABLET | Refills: 1 | Status: SHIPPED | OUTPATIENT
Start: 2020-03-13 | End: 2020-09-16

## 2020-04-24 RX ORDER — DULOXETIN HYDROCHLORIDE 30 MG/1
CAPSULE, DELAYED RELEASE ORAL
Qty: 90 CAPSULE | Refills: 1 | Status: SHIPPED | OUTPATIENT
Start: 2020-04-24 | End: 2020-10-14

## 2020-05-04 RX ORDER — BUPROPION HYDROCHLORIDE 100 MG/1
TABLET ORAL
Qty: 90 TABLET | Refills: 1 | Status: SHIPPED | OUTPATIENT
Start: 2020-05-04 | End: 2020-08-16

## 2021-06-27 ENCOUNTER — PATIENT MESSAGE (OUTPATIENT)
Dept: PSYCHIATRY | Facility: CLINIC | Age: 56
End: 2021-06-27

## 2021-06-28 RX ORDER — LAMOTRIGINE 100 MG/1
100 TABLET ORAL 2 TIMES DAILY
Qty: 60 TABLET | Refills: 0 | Status: SHIPPED | OUTPATIENT
Start: 2021-06-28 | End: 2021-08-02

## 2021-07-26 ENCOUNTER — OFFICE VISIT (OUTPATIENT)
Dept: PSYCHIATRY | Facility: CLINIC | Age: 56
End: 2021-07-26
Payer: COMMERCIAL

## 2021-07-26 VITALS
SYSTOLIC BLOOD PRESSURE: 125 MMHG | HEART RATE: 74 BPM | DIASTOLIC BLOOD PRESSURE: 70 MMHG | BODY MASS INDEX: 34.22 KG/M2 | HEIGHT: 67 IN | RESPIRATION RATE: 17 BRPM | WEIGHT: 218.06 LBS

## 2021-07-26 DIAGNOSIS — F41.9 ANXIETY: ICD-10-CM

## 2021-07-26 DIAGNOSIS — F32.89 OTHER DEPRESSION: ICD-10-CM

## 2021-07-26 DIAGNOSIS — F39 MOOD DISORDER: Primary | ICD-10-CM

## 2021-07-26 PROCEDURE — 90792 PR PSYCHIATRIC DIAGNOSTIC EVALUATION W/MEDICAL SERVICES: ICD-10-PCS | Mod: S$GLB,,, | Performed by: PSYCHIATRY & NEUROLOGY

## 2021-07-26 PROCEDURE — 99999 PR PBB SHADOW E&M-EST. PATIENT-LVL IV: CPT | Mod: PBBFAC,,, | Performed by: PSYCHIATRY & NEUROLOGY

## 2021-07-26 PROCEDURE — 99999 PR PBB SHADOW E&M-EST. PATIENT-LVL IV: ICD-10-PCS | Mod: PBBFAC,,, | Performed by: PSYCHIATRY & NEUROLOGY

## 2021-07-26 PROCEDURE — 90792 PSYCH DIAG EVAL W/MED SRVCS: CPT | Mod: S$GLB,,, | Performed by: PSYCHIATRY & NEUROLOGY

## 2021-08-23 ENCOUNTER — OFFICE VISIT (OUTPATIENT)
Dept: PSYCHIATRY | Facility: CLINIC | Age: 56
End: 2021-08-23
Payer: COMMERCIAL

## 2021-08-23 VITALS
BODY MASS INDEX: 33.84 KG/M2 | HEIGHT: 67 IN | WEIGHT: 215.63 LBS | DIASTOLIC BLOOD PRESSURE: 70 MMHG | SYSTOLIC BLOOD PRESSURE: 116 MMHG | OXYGEN SATURATION: 97 % | HEART RATE: 66 BPM

## 2021-08-23 DIAGNOSIS — F41.9 ANXIETY: Primary | ICD-10-CM

## 2021-08-23 DIAGNOSIS — F32.89 OTHER DEPRESSION: ICD-10-CM

## 2021-08-23 PROCEDURE — 99214 OFFICE O/P EST MOD 30 MIN: CPT | Mod: S$GLB,,, | Performed by: PSYCHIATRY & NEUROLOGY

## 2021-08-23 PROCEDURE — 99214 PR OFFICE/OUTPT VISIT, EST, LEVL IV, 30-39 MIN: ICD-10-PCS | Mod: S$GLB,,, | Performed by: PSYCHIATRY & NEUROLOGY

## 2021-08-23 PROCEDURE — 99999 PR PBB SHADOW E&M-EST. PATIENT-LVL III: CPT | Mod: PBBFAC,,, | Performed by: PSYCHIATRY & NEUROLOGY

## 2021-08-23 PROCEDURE — 99999 PR PBB SHADOW E&M-EST. PATIENT-LVL III: ICD-10-PCS | Mod: PBBFAC,,, | Performed by: PSYCHIATRY & NEUROLOGY

## 2021-08-23 RX ORDER — LAMOTRIGINE 100 MG/1
TABLET ORAL
Qty: 90 TABLET | Refills: 2 | Status: SHIPPED | OUTPATIENT
Start: 2021-08-23 | End: 2021-08-27 | Stop reason: SDUPTHER

## 2021-08-27 ENCOUNTER — PATIENT MESSAGE (OUTPATIENT)
Dept: PSYCHIATRY | Facility: CLINIC | Age: 56
End: 2021-08-27

## 2021-08-29 RX ORDER — LAMOTRIGINE 100 MG/1
TABLET ORAL
Qty: 90 TABLET | Refills: 2 | Status: SHIPPED | OUTPATIENT
Start: 2021-08-29 | End: 2021-11-16

## 2021-10-13 ENCOUNTER — PATIENT MESSAGE (OUTPATIENT)
Dept: BARIATRICS | Facility: CLINIC | Age: 56
End: 2021-10-13
Payer: COMMERCIAL

## 2021-10-18 ENCOUNTER — PATIENT MESSAGE (OUTPATIENT)
Dept: BARIATRICS | Facility: CLINIC | Age: 56
End: 2021-10-18
Payer: COMMERCIAL

## 2021-11-04 ENCOUNTER — PATIENT MESSAGE (OUTPATIENT)
Dept: PSYCHIATRY | Facility: CLINIC | Age: 56
End: 2021-11-04
Payer: COMMERCIAL

## 2021-11-04 RX ORDER — DULOXETIN HYDROCHLORIDE 30 MG/1
30 CAPSULE, DELAYED RELEASE ORAL DAILY
Qty: 90 CAPSULE | Refills: 1 | Status: SHIPPED | OUTPATIENT
Start: 2021-11-04 | End: 2021-11-30 | Stop reason: SDUPTHER

## 2021-11-30 ENCOUNTER — OFFICE VISIT (OUTPATIENT)
Dept: PSYCHIATRY | Facility: CLINIC | Age: 56
End: 2021-11-30
Payer: COMMERCIAL

## 2021-11-30 VITALS
DIASTOLIC BLOOD PRESSURE: 90 MMHG | HEIGHT: 67 IN | WEIGHT: 220 LBS | SYSTOLIC BLOOD PRESSURE: 142 MMHG | OXYGEN SATURATION: 98 % | HEART RATE: 82 BPM | BODY MASS INDEX: 34.53 KG/M2 | RESPIRATION RATE: 18 BRPM

## 2021-11-30 DIAGNOSIS — F41.9 ANXIETY: Primary | ICD-10-CM

## 2021-11-30 DIAGNOSIS — F32.89 OTHER DEPRESSION: ICD-10-CM

## 2021-11-30 PROCEDURE — 99214 OFFICE O/P EST MOD 30 MIN: CPT | Mod: S$GLB,,, | Performed by: PSYCHIATRY & NEUROLOGY

## 2021-11-30 PROCEDURE — 99999 PR PBB SHADOW E&M-EST. PATIENT-LVL III: ICD-10-PCS | Mod: PBBFAC,,, | Performed by: PSYCHIATRY & NEUROLOGY

## 2021-11-30 PROCEDURE — 99214 PR OFFICE/OUTPT VISIT, EST, LEVL IV, 30-39 MIN: ICD-10-PCS | Mod: S$GLB,,, | Performed by: PSYCHIATRY & NEUROLOGY

## 2021-11-30 PROCEDURE — 99999 PR PBB SHADOW E&M-EST. PATIENT-LVL III: CPT | Mod: PBBFAC,,, | Performed by: PSYCHIATRY & NEUROLOGY

## 2021-11-30 PROCEDURE — 90833 PSYTX W PT W E/M 30 MIN: CPT | Mod: S$GLB,,, | Performed by: PSYCHIATRY & NEUROLOGY

## 2021-11-30 PROCEDURE — 90833 PR PSYCHOTHERAPY W/PATIENT W/E&M, 30 MIN (ADD ON): ICD-10-PCS | Mod: S$GLB,,, | Performed by: PSYCHIATRY & NEUROLOGY

## 2021-11-30 RX ORDER — LAMOTRIGINE 100 MG/1
TABLET ORAL
Qty: 270 TABLET | Refills: 1 | Status: SHIPPED | OUTPATIENT
Start: 2021-11-30 | End: 2022-02-24 | Stop reason: SDUPTHER

## 2021-11-30 RX ORDER — ERYTHROMYCIN AND BENZOYL PEROXIDE 30; 50 MG/G; MG/G
GEL TOPICAL
COMMUNITY
Start: 2021-11-28 | End: 2022-10-03

## 2021-11-30 RX ORDER — DULOXETIN HYDROCHLORIDE 30 MG/1
30 CAPSULE, DELAYED RELEASE ORAL DAILY
Qty: 90 CAPSULE | Refills: 1 | Status: SHIPPED | OUTPATIENT
Start: 2021-11-30 | End: 2022-05-26

## 2021-11-30 RX ORDER — BUPROPION HYDROCHLORIDE 100 MG/1
100 TABLET ORAL 2 TIMES DAILY
Qty: 180 TABLET | Refills: 1 | Status: SHIPPED | OUTPATIENT
Start: 2021-11-30 | End: 2022-05-26 | Stop reason: SDUPTHER

## 2022-02-24 ENCOUNTER — PATIENT MESSAGE (OUTPATIENT)
Dept: PSYCHIATRY | Facility: CLINIC | Age: 57
End: 2022-02-24
Payer: COMMERCIAL

## 2022-02-24 RX ORDER — LAMOTRIGINE 100 MG/1
TABLET ORAL
Qty: 270 TABLET | Refills: 1 | Status: SHIPPED | OUTPATIENT
Start: 2022-02-24 | End: 2022-05-26 | Stop reason: SDUPTHER

## 2022-05-26 ENCOUNTER — OFFICE VISIT (OUTPATIENT)
Dept: PSYCHIATRY | Facility: CLINIC | Age: 57
End: 2022-05-26
Payer: COMMERCIAL

## 2022-05-26 VITALS
OXYGEN SATURATION: 97 % | HEART RATE: 72 BPM | DIASTOLIC BLOOD PRESSURE: 82 MMHG | HEIGHT: 67 IN | WEIGHT: 220 LBS | SYSTOLIC BLOOD PRESSURE: 118 MMHG | BODY MASS INDEX: 34.53 KG/M2 | RESPIRATION RATE: 17 BRPM

## 2022-05-26 DIAGNOSIS — G89.29 OTHER CHRONIC BACK PAIN: ICD-10-CM

## 2022-05-26 DIAGNOSIS — M54.9 OTHER CHRONIC BACK PAIN: ICD-10-CM

## 2022-05-26 DIAGNOSIS — F32.89 OTHER DEPRESSION: ICD-10-CM

## 2022-05-26 DIAGNOSIS — F41.9 ANXIETY: Primary | ICD-10-CM

## 2022-05-26 PROCEDURE — 99999 PR PBB SHADOW E&M-EST. PATIENT-LVL III: CPT | Mod: PBBFAC,,, | Performed by: PSYCHIATRY & NEUROLOGY

## 2022-05-26 PROCEDURE — 99999 PR PBB SHADOW E&M-EST. PATIENT-LVL III: ICD-10-PCS | Mod: PBBFAC,,, | Performed by: PSYCHIATRY & NEUROLOGY

## 2022-05-26 PROCEDURE — 99214 OFFICE O/P EST MOD 30 MIN: CPT | Mod: S$GLB,,, | Performed by: PSYCHIATRY & NEUROLOGY

## 2022-05-26 PROCEDURE — 99214 PR OFFICE/OUTPT VISIT, EST, LEVL IV, 30-39 MIN: ICD-10-PCS | Mod: S$GLB,,, | Performed by: PSYCHIATRY & NEUROLOGY

## 2022-05-26 RX ORDER — BUPROPION HYDROCHLORIDE 100 MG/1
100 TABLET ORAL 2 TIMES DAILY
Qty: 180 TABLET | Refills: 1 | Status: SHIPPED | OUTPATIENT
Start: 2022-05-26 | End: 2022-12-23

## 2022-05-26 RX ORDER — DULOXETIN HYDROCHLORIDE 60 MG/1
60 CAPSULE, DELAYED RELEASE ORAL DAILY
Qty: 90 CAPSULE | Refills: 1 | Status: SHIPPED | OUTPATIENT
Start: 2022-05-26 | End: 2022-11-26

## 2022-05-26 RX ORDER — LAMOTRIGINE 100 MG/1
TABLET ORAL
Qty: 270 TABLET | Refills: 1 | Status: SHIPPED | OUTPATIENT
Start: 2022-05-26 | End: 2023-01-24 | Stop reason: SDUPTHER

## 2022-05-26 NOTE — PROGRESS NOTES
"Outpatient Psychiatry Follow-Up Visit (MD/NP)    5/26/2022    Clinical Status of Patient:  Outpatient (Ambulatory)    Chief Complaint:  Tiffany Hendrix is a 56 y.o. female who presents today for follow-up of depression and anxiety.  Met with patient.      Interval History and Content of Current Session:  Interim Events/Subjective Report/Content of Current Session: The patient was seen and examined; her chart was reviewed. Reviewed notes by Ruth Hightower MA at 2/24/2022  8:58 AM      She has been compliant with medications. She denied ay side effects. She reports good efficacy and tolerability.She found the previous adjustments helpful.      She had no new-medical stressors since last seen in clinic.  She has some spinal abnormalities (buldging discs, bone spurs). She continues with chronic back pain- it has been worse over the last few months.  She remains on estrogen with noted benefit on weight and hot flashes- there may be a change in her meds in the near future.      New psychosocial stressors have occurred since last seen. Family and marriage remain stable and supportive. Her children are doing well- they started school; her daughter graduated HS and going to [employer] next semester. Finances and housing are stable.      She previously found the increase in lamictal helpful with mood lability. Symptoms remain well controlled since her last appointment, "I'm doing pretty good.. aside form when I'm hurting then I get irritable and anxious." She would change tx as documented below. It seems that her periodic mood/anxiey symptoms are secondary to pain.    Controlled Symptoms of Depression: no diminished mood or loss of interest/anhedonia; irritability, diminished energy, change in sleep, change in appetite, diminished concentration or cognition or indecisiveness, PMA/R, excessive guilt or hopelessness or worthlessness, suicidal ideations     Controlled Changes in Sleep: no trouble with initiation, maintenance, " early morning awakening with inability to return to sleep, or hypersomnolence      Denied Suicidal/Homicidal ideations: no active/passive ideations, organized plans, or future intentions     Denied Suicidal/Homicidal ideations: no active/passive ideations, organized plans, or future intentions     Denied Symptoms of psychosis: no hallucinations, delusions, disorganized thinking, disorganized behavior or abnormal motor behavior, or negative symptoms      Denied past or current Symptoms of aron or hypomania: no elevated, expansive, or irritable mood with increased energy or activity; with no inflated self-esteem or grandiosity, decreased need for sleep, increased rate of speech, FOI or racing thoughts, distractibility, increased goal directed activity or PMA, or risky/disinhibited behavior     Controlled Symptoms of Anxiety: no excessive anxiety/worry/fear; with no current symptoms of restlessness, fatigue, poor concentration, irritability, muscle tension, or sleep disturbance; no panic attacks; without agoraphobia; no Social Anxiety     Weight problems- currently at 220 lbs, she was at 220 lbs last visit and 215 lbs prior that visit; she was at 250 lbs on 9/30/14.     She reports that she has not used nicotine since 1/21/19; she has found wellbutrin to be helpful.     Psychotherapy:  · Target symptoms: depression, anxiety   · Why chosen therapy is appropriate versus another modality: relevant to diagnosis, patient responds to this modality, evidence based practice  · Outcome monitoring methods: self-report, observation  · Therapeutic intervention type: insight oriented psychotherapy, behavior modifying psychotherapy, supportive psychotherapy, interactive psychotherapy  · Topics discussed/themes: stress related to medical comorbidities, difficulty managing affect in interpersonal relationships, building skills sets for symptom management, symptom recognition  · The patient's response to the intervention is accepting.  "The patient's progress toward treatment goals is good.   · Duration of intervention: 1 minutes.    Review of Systems   · PSYCHIATRIC: Pertinant items are noted in the narrative.  · CONSTITUTIONAL: No weight gain or loss.   · MUSCULOSKELETAL: No pain or stiffness of the joints.  · NEUROLOGIC: No weakness, sensory changes, seizures, confusion, memory loss, tremor or other abnormal movements.  · ENDOCRINE: No polydipsia or polyuria.  · INTEGUMENTARY: No rashes or lacerations.  · EYES: No exophthalmos, jaundice or blindness.  · ENT: No dizziness, tinnitus or hearing loss.  · RESPIRATORY: No shortness of breath.  · CARDIOVASCULAR: No tachycardia or chest pain.  · GASTROINTESTINAL: No nausea, vomiting, pain, constipation or diarrhea.  · GENITOURINARY: No frequency, dysuria or sexual dysfunction.  · HEMATOLOGIC/LYMPHATIC: No excessive bleeding, prolonged or excessive bleeding after dental extraction/injury.  · ALLERGIC/IMMUNOLOGIC: No allergic response to materials, foods or animals at this time.    Past Medical, Family and Social History: The patient's past medical, family and social history have been reviewed and updated as appropriate within the electronic medical record - see encounter notes.    Compliance: yes    Side effects: see above    Risk Parameters:  Patient reports no suicidal ideation  Patient reports no homicidal ideation  Patient reports no self-injurious behavior  Patient reports no violent behavior    Exam (detailed: at least 9 elements; comprehensive: all 15 elements)   Constitutional  Vitals:  Most recent vital signs, dated less than 90 days prior to this appointment, were reviewed.   Vitals:    05/26/22 1309   BP: 118/82   Pulse: 72   Resp: 17   SpO2: 97%   Weight: 99.8 kg (220 lb 0.3 oz)   Height: 5' 7" (1.702 m)     Body mass index is 34.46 kg/m².       General:  unremarkable, age appropriate, well nourished, casually dressed, neatly groomed, obese     Musculoskeletal  Muscle Strength/Tone:  no " "dyskinesia, no tremor, no tic   Gait & Station:  non-ataxic     Psychiatric  Speech:  no latency; no press, spontaneous   Mood & Affect:  steady, euthymic"good"  congruent and appropriate, full, bright   Thought Process:  normal and logical, goal-directed   Associations:  intact   Thought Content:  normal, no suicidality, no homicidality, delusions, or paranoia   Insight:  intact, has awareness of illness   Judgement: behavior is adequate to circumstances, age appropriate   Orientation:  person, place, situation, time/date, day of week, month of year, year   Memory: intact for content of interview, able to remember recent events- yes, able to remember remote events- yes   Language: grossly intact, able to name, able to repeat   Attention Span & Concentration:  able to focus, completed tasks   Fund of Knowledge:  intact and appropriate to age and level of education, familiar with aspects of current personal life     Assessment and Diagnosis   Status/Progress: Based on the examination today, the patient's problem(s) is/are improved.  New problems have not been presented today.   Co-morbidities are complicating management of the primary condition.  There are no active rule-out diagnoses for this patient at this time.     General Impression:     Unspecified Mood Disorder  Unspecified Anxiety Disorder     Morbid obesity   Nicotine Dependence in sustained full remission     Pain Disorder      Psychosocial stressors     Medical issues:   Knee s/p replacement  Back pain  Menopause       Intervention/Counseling/Treatment Plan   · Medication Management: The risks and benefits of medication were discussed with the patient.  · Counseling provided with patient as follows: importance of compliance with chosen treatment options was emphasized, risks and benefits of treatment options, including medications, were discussed with the patient, risk factor reduction, prognosis, patient education, instructions for  management, treatment " and follow-up were reviewed    Medication/Counseling:      Mood: pt counseled  -Continue Bupropion SR 100 mg po q day- for depression and nicotine cessation  -Continue Lamictal at 100 mg po q AM and 200 mg po q HS  -cymblata as below     Anxiety: pt counseled  Increase Cymbalta from 30 to 60 mg po q AM for depression/anxiety/chronic pain     Obesity: pt counseled  -wellbutrin off-label    Pain Disorder: pt counseled  -cymbalta     Nicotine use: pt counseled  -encouraged to sustain remission     Psychosocial stressors: pt counseled      Medical issues: pt counseled  -continue tx per providers     Discussed diagnosis, risks and benefits of proposed treatment vs alternative treatments vs no treatment, and potential side effects of these treatments.  The patient expresses understanding of the above and displays the capacity to agree with this treatment given said understanding.  Patient also agrees that, currently, the benefits outweigh the risks and would like to pursue treatment at this time       Psychotherapy:  Psychotherapy provided today; will provide in our sessions as needed  Pt declined referral for weekly psychotherapy        Labs:  Previously Reviewed labs from 8/9/21- lamictal level was WNL; no new labs were obtained since her last appointment      Return to Clinic: 3 months, sooner if needed     Jey Og MD  Psychiatry

## 2022-09-20 ENCOUNTER — TELEPHONE (OUTPATIENT)
Dept: BARIATRICS | Facility: CLINIC | Age: 57
End: 2022-09-20
Payer: COMMERCIAL

## 2022-09-20 ENCOUNTER — PATIENT MESSAGE (OUTPATIENT)
Dept: BARIATRICS | Facility: CLINIC | Age: 57
End: 2022-09-20
Payer: COMMERCIAL

## 2022-09-20 NOTE — TELEPHONE ENCOUNTER
Attempted to reach pt in regards to scheduling 3 year post op. No answer. Left message. Sent portal message.

## 2023-01-24 ENCOUNTER — OFFICE VISIT (OUTPATIENT)
Dept: PSYCHIATRY | Facility: CLINIC | Age: 58
End: 2023-01-24
Payer: COMMERCIAL

## 2023-01-24 VITALS
SYSTOLIC BLOOD PRESSURE: 110 MMHG | OXYGEN SATURATION: 98 % | HEIGHT: 67 IN | HEART RATE: 102 BPM | RESPIRATION RATE: 17 BRPM | BODY MASS INDEX: 33.15 KG/M2 | WEIGHT: 211.19 LBS | DIASTOLIC BLOOD PRESSURE: 76 MMHG

## 2023-01-24 DIAGNOSIS — G89.29 OTHER CHRONIC BACK PAIN: ICD-10-CM

## 2023-01-24 DIAGNOSIS — M54.9 OTHER CHRONIC BACK PAIN: ICD-10-CM

## 2023-01-24 DIAGNOSIS — F32.89 OTHER DEPRESSION: ICD-10-CM

## 2023-01-24 DIAGNOSIS — G47.33 OSA (OBSTRUCTIVE SLEEP APNEA): ICD-10-CM

## 2023-01-24 DIAGNOSIS — F41.9 ANXIETY: Primary | ICD-10-CM

## 2023-01-24 PROCEDURE — 99999 PR PBB SHADOW E&M-EST. PATIENT-LVL III: ICD-10-PCS | Mod: PBBFAC,,, | Performed by: PSYCHIATRY & NEUROLOGY

## 2023-01-24 PROCEDURE — 99214 PR OFFICE/OUTPT VISIT, EST, LEVL IV, 30-39 MIN: ICD-10-PCS | Mod: S$GLB,,, | Performed by: PSYCHIATRY & NEUROLOGY

## 2023-01-24 PROCEDURE — 99214 OFFICE O/P EST MOD 30 MIN: CPT | Mod: S$GLB,,, | Performed by: PSYCHIATRY & NEUROLOGY

## 2023-01-24 PROCEDURE — 99999 PR PBB SHADOW E&M-EST. PATIENT-LVL III: CPT | Mod: PBBFAC,,, | Performed by: PSYCHIATRY & NEUROLOGY

## 2023-01-24 RX ORDER — LAMOTRIGINE 100 MG/1
TABLET ORAL
Qty: 270 TABLET | Refills: 1 | Status: SHIPPED | OUTPATIENT
Start: 2023-01-24 | End: 2023-05-08

## 2023-01-24 RX ORDER — BUPROPION HYDROCHLORIDE 100 MG/1
100 TABLET ORAL 2 TIMES DAILY
Qty: 180 TABLET | Refills: 1 | Status: SHIPPED | OUTPATIENT
Start: 2023-01-24 | End: 2023-06-30

## 2023-01-24 RX ORDER — DULOXETIN HYDROCHLORIDE 60 MG/1
60 CAPSULE, DELAYED RELEASE ORAL DAILY
Qty: 90 CAPSULE | Refills: 1 | Status: SHIPPED | OUTPATIENT
Start: 2023-01-24 | End: 2023-06-30 | Stop reason: SDUPTHER

## 2023-01-24 RX ORDER — HYDROCODONE BITARTRATE AND ACETAMINOPHEN 5; 325 MG/1; MG/1
1 TABLET ORAL EVERY 6 HOURS PRN
COMMUNITY
Start: 2023-01-10 | End: 2023-06-30 | Stop reason: ALTCHOICE

## 2023-01-24 NOTE — PROGRESS NOTES
"Outpatient Psychiatry Follow-Up Visit (MD/NP)    1/24/2023    Clinical Status of Patient:  Outpatient (Ambulatory)    Chief Complaint:  Tiffany Hendrix is a 57 y.o. female who presents today for follow-up of depression and anxiety.  Met with patient.      Interval History and Content of Current Session:  Interim Events/Subjective Report/Content of Current Session: The patient was seen and examined; her chart was reviewed. Reviewed notes by Vibha Knapp MA at 9/20/2022  3:27 PM; and Tracee Breaux MD at 10/3/2022  8:48 AM    She has been compliant with medications. She denied ay side effects. She reports good efficacy and tolerability.She found the previous adjustments helpful.      She had one new-medical stressors since last seen in clinic.  She has some spinal abnormalities (buldging discs, bone spurs). She continues with chronic back pain- it has been variable over the last few months.  She remains on estrogen with noted benefit on weight and hot flashes- there may be a change in her meds in the near future.   -she had a total knee surgery in mid 12/2022- surgery went well; she is in recovery and using a walker for now; PT is 3 x per week  -PT has also been helping with back pain     New psychosocial stressors have occurred since last seen. Family and marriage remain stable and supportive. Her children are doing well- they are in school; her daughter graduated HS and at [employer] (doing well). Finances and housing are stable.      She previously found the increase in lamictal helpful with mood lability. Symptoms remain well controlled since her last appointment, "I'm doing pretty good." She would like to continue tx without changes as documented below. Her pain is doing much better    Controlled Symptoms of Depression: no diminished mood or loss of interest/anhedonia; irritability, diminished energy, change in sleep, change in appetite, diminished concentration or cognition or indecisiveness, PMA/R, excessive " guilt or hopelessness or worthlessness, suicidal ideations     Controlled Changes in Sleep: no trouble with initiation, maintenance, early morning awakening with inability to return to sleep, or hypersomnolence      Denied Suicidal/Homicidal ideations: no active/passive ideations, organized plans, or future intentions     Denied Suicidal/Homicidal ideations: no active/passive ideations, organized plans, or future intentions     Denied Symptoms of psychosis: no hallucinations, delusions, disorganized thinking, disorganized behavior or abnormal motor behavior, or negative symptoms      Denied past or current Symptoms of aron or hypomania: no elevated, expansive, or irritable mood with increased energy or activity; with no inflated self-esteem or grandiosity, decreased need for sleep, increased rate of speech, FOI or racing thoughts, distractibility, increased goal directed activity or PMA, or risky/disinhibited behavior     Controlled Symptoms of Anxiety: no excessive anxiety/worry/fear; with no current symptoms of restlessness, fatigue, poor concentration, irritability, muscle tension, or sleep disturbance; no panic attacks; without agoraphobia; no Social Anxiety     Weight problems- currently at 211 lbs, she was at 220 lbs last visit and 215 lbs prior that visit; she was at 250 lbs on 9/30/14.     She reports that she has not used nicotine since 1/21/19; she has found wellbutrin to be helpful.     Psychotherapy:  Target symptoms: depression, anxiety   Why chosen therapy is appropriate versus another modality: relevant to diagnosis, patient responds to this modality, evidence based practice  Outcome monitoring methods: self-report, observation  Therapeutic intervention type: insight oriented psychotherapy, behavior modifying psychotherapy, supportive psychotherapy, interactive psychotherapy  Topics discussed/themes: stress related to medical comorbidities, difficulty managing affect in interpersonal relationships,  "building skills sets for symptom management, symptom recognition  The patient's response to the intervention is accepting. The patient's progress toward treatment goals is good.   Duration of intervention: 1 minutes.    Review of Systems   PSYCHIATRIC: Pertinant items are noted in the narrative.  CONSTITUTIONAL: No weight gain or loss.   MUSCULOSKELETAL: No pain or stiffness of the joints.  NEUROLOGIC: No weakness, sensory changes, seizures, confusion, memory loss, tremor or other abnormal movements.  ENDOCRINE: No polydipsia or polyuria.  INTEGUMENTARY: No rashes or lacerations.  EYES: No exophthalmos, jaundice or blindness.  ENT: No dizziness, tinnitus or hearing loss.  RESPIRATORY: No shortness of breath.  CARDIOVASCULAR: No tachycardia or chest pain.  GASTROINTESTINAL: No nausea, vomiting, pain, constipation or diarrhea.  GENITOURINARY: No frequency, dysuria or sexual dysfunction.  HEMATOLOGIC/LYMPHATIC: No excessive bleeding, prolonged or excessive bleeding after dental extraction/injury.  ALLERGIC/IMMUNOLOGIC: No allergic response to materials, foods or animals at this time.    Past Medical, Family and Social History: The patient's past medical, family and social history have been reviewed and updated as appropriate within the electronic medical record - see encounter notes.    Compliance: yes    Side effects: see above    Risk Parameters:  Patient reports no suicidal ideation  Patient reports no homicidal ideation  Patient reports no self-injurious behavior  Patient reports no violent behavior    Exam (detailed: at least 9 elements; comprehensive: all 15 elements)   Constitutional  Vitals:  Most recent vital signs, dated less than 90 days prior to this appointment, were reviewed.   Vitals:    01/24/23 1219   BP: 110/76   Pulse: 102   Resp: 17   SpO2: 98%   Weight: 95.8 kg (211 lb 3.2 oz)   Height: 5' 7" (1.702 m)       Body mass index is 33.08 kg/m².       General:  unremarkable, age appropriate, well " "nourished, casually dressed, neatly groomed, obese     Musculoskeletal  Muscle Strength/Tone:  no dyskinesia, no tremor, no tic   Gait & Station:  non-ataxic     Psychiatric  Speech:  no latency; no press, spontaneous   Mood & Affect:  steady, euthymic"good"  congruent and appropriate, full, bright   Thought Process:  normal and logical, goal-directed   Associations:  intact   Thought Content:  normal, no suicidality, no homicidality, delusions, or paranoia   Insight:  intact, has awareness of illness   Judgement: behavior is adequate to circumstances, age appropriate   Orientation:  person, place, situation, time/date, day of week, month of year, year   Memory: intact for content of interview, able to remember recent events- yes, able to remember remote events- yes   Language: grossly intact, able to name, able to repeat   Attention Span & Concentration:  able to focus, completed tasks   Fund of Knowledge:  intact and appropriate to age and level of education, familiar with aspects of current personal life     Assessment and Diagnosis   Status/Progress: Based on the examination today, the patient's problem(s) is/are improved.  New problems have not been presented today.   Co-morbidities are complicating management of the primary condition.  There are no active rule-out diagnoses for this patient at this time.     General Impression:     Unspecified Mood Disorder  Unspecified Anxiety Disorder     Morbid obesity   Nicotine Dependence in sustained full remission     Pain Disorder      Psychosocial stressors     Medical issues:   Knee s/p replacement  Back pain  Menopause       Intervention/Counseling/Treatment Plan   Medication Management: The risks and benefits of medication were discussed with the patient.  Counseling provided with patient as follows: importance of compliance with chosen treatment options was emphasized, risks and benefits of treatment options, including medications, were discussed with the patient, " risk factor reduction, prognosis, patient education, instructions for  management, treatment and follow-up were reviewed    Medication/Counseling:      Mood: pt counseled  -Continue Bupropion  mg po q day- for depression and nicotine cessation  -Continue Lamictal at 100 mg po q AM and 200 mg po q HS  -cymblata as below     Anxiety: pt counseled  Continue Cymbalta  60 mg po q AM for depression/anxiety/chronic pain     Obesity: pt counseled  -wellbutrin off-label    Pain Disorder: pt counseled  -cymbalta     Nicotine use: pt counseled  -encouraged to sustain remission     Psychosocial stressors: pt counseled      Medical issues: pt counseled  -continue tx per providers     Discussed diagnosis, risks and benefits of proposed treatment vs alternative treatments vs no treatment, and potential side effects of these treatments.  The patient expresses understanding of the above and displays the capacity to agree with this treatment given said understanding.  Patient also agrees that, currently, the benefits outweigh the risks and would like to pursue treatment at this time       Psychotherapy:  Psychotherapy provided today; will provide in our sessions as needed  Pt declined referral for weekly psychotherapy        Labs:  Previously Reviewed labs from 8/9/21- lamictal level was WNL; no new labs were obtained since her last appointment      Return to Clinic: 6 months, sooner if needed      Jey Og MD  Psychiatry

## 2023-05-08 RX ORDER — LAMOTRIGINE 100 MG/1
TABLET ORAL
Qty: 270 TABLET | Refills: 1 | Status: SHIPPED | OUTPATIENT
Start: 2023-05-08 | End: 2023-06-30 | Stop reason: SDUPTHER

## 2023-06-30 ENCOUNTER — OFFICE VISIT (OUTPATIENT)
Dept: PSYCHIATRY | Facility: CLINIC | Age: 58
End: 2023-06-30
Payer: COMMERCIAL

## 2023-06-30 VITALS
HEIGHT: 67 IN | SYSTOLIC BLOOD PRESSURE: 114 MMHG | DIASTOLIC BLOOD PRESSURE: 74 MMHG | RESPIRATION RATE: 17 BRPM | WEIGHT: 211.88 LBS | OXYGEN SATURATION: 98 % | BODY MASS INDEX: 33.26 KG/M2 | HEART RATE: 74 BPM

## 2023-06-30 DIAGNOSIS — F32.89 OTHER DEPRESSION: Primary | ICD-10-CM

## 2023-06-30 DIAGNOSIS — F41.9 ANXIETY: ICD-10-CM

## 2023-06-30 PROCEDURE — 99215 OFFICE O/P EST HI 40 MIN: CPT | Mod: S$GLB,,, | Performed by: PSYCHIATRY & NEUROLOGY

## 2023-06-30 PROCEDURE — 99215 PR OFFICE/OUTPT VISIT, EST, LEVL V, 40-54 MIN: ICD-10-PCS | Mod: S$GLB,,, | Performed by: PSYCHIATRY & NEUROLOGY

## 2023-06-30 PROCEDURE — 99999 PR PBB SHADOW E&M-EST. PATIENT-LVL III: ICD-10-PCS | Mod: PBBFAC,,, | Performed by: PSYCHIATRY & NEUROLOGY

## 2023-06-30 PROCEDURE — 99999 PR PBB SHADOW E&M-EST. PATIENT-LVL III: CPT | Mod: PBBFAC,,, | Performed by: PSYCHIATRY & NEUROLOGY

## 2023-06-30 RX ORDER — HYDROXYZINE PAMOATE 25 MG/1
25 CAPSULE ORAL EVERY 6 HOURS PRN
Qty: 60 CAPSULE | Refills: 1 | Status: SHIPPED | OUTPATIENT
Start: 2023-06-30 | End: 2023-07-07

## 2023-06-30 RX ORDER — BUPROPION HYDROCHLORIDE 100 MG/1
150 TABLET ORAL 2 TIMES DAILY
Qty: 270 TABLET | Refills: 1 | Status: SHIPPED | OUTPATIENT
Start: 2023-06-30 | End: 2023-09-07

## 2023-06-30 RX ORDER — DULOXETIN HYDROCHLORIDE 60 MG/1
60 CAPSULE, DELAYED RELEASE ORAL DAILY
Qty: 90 CAPSULE | Refills: 1 | Status: SHIPPED | OUTPATIENT
Start: 2023-06-30 | End: 2023-10-23 | Stop reason: SDUPTHER

## 2023-06-30 RX ORDER — LAMOTRIGINE 100 MG/1
TABLET ORAL
Qty: 270 TABLET | Refills: 1 | Status: SHIPPED | OUTPATIENT
Start: 2023-06-30 | End: 2023-10-23 | Stop reason: SDUPTHER

## 2023-06-30 NOTE — PROGRESS NOTES
"Outpatient Psychiatry Follow-Up Visit (MD/NP)    6/30/2023    Clinical Status of Patient:  Outpatient (Ambulatory)    Chief Complaint:  Tiffany Hendrix is a 57 y.o. female who presents today for follow-up of depression and anxiety.  Met with patient.      Interval History and Content of Current Session:  Interim Events/Subjective Report/Content of Current Session: The patient was seen and examined; her chart was reviewed. Reviewed notes by Tracee Breaux MD at 5/17/2023  8:29 AM    She has been compliant with medications. She denied ay side effects. She reports good efficacy and tolerability.She found the previous adjustments helpful.      She had one new-medical stressors since last seen in clinic.  She has some spinal abnormalities (buldging discs, bone spurs). She continues with chronic back pain- it has been variable over the last few months.  She remains on estrogen with noted benefit on weight and hot flashes- there may be a change in her meds in the near future.   -she had a total knee surgery in mid 12/2022- surgery went well; she is in recovery and using a walker for now; PT is 3 x per week  -PT has also been helping with back pain     New psychosocial stressors have occurred since last seen. Family and marriage remain stable and supportive. Her children are doing well- they are in school; her daughter graduated HS and at [employer] (doing well). Finances and housing are stable.      She previously found the increase in lamictal helpful with mood lability. Symptoms remain well controlled since her last appointment, "I'm doing pretty good." She would like to continue tx without changes as documented below. Her pain is doing much better  -her daughter had a psychotic break     Controlled Symptoms of Depression: no diminished mood or loss of interest/anhedonia; irritability, diminished energy, change in sleep, change in appetite, diminished concentration or cognition or indecisiveness, PMA/R, excessive guilt or " hopelessness or worthlessness, suicidal ideations     Controlled Changes in Sleep: no trouble with initiation, maintenance, early morning awakening with inability to return to sleep, or hypersomnolence      Denied Suicidal/Homicidal ideations: no active/passive ideations, organized plans, or future intentions     Denied Suicidal/Homicidal ideations: no active/passive ideations, organized plans, or future intentions     Denied Symptoms of psychosis: no hallucinations, delusions, disorganized thinking, disorganized behavior or abnormal motor behavior, or negative symptoms      Denied past or current Symptoms of aron or hypomania: no elevated, expansive, or irritable mood with increased energy or activity; with no inflated self-esteem or grandiosity, decreased need for sleep, increased rate of speech, FOI or racing thoughts, distractibility, increased goal directed activity or PMA, or risky/disinhibited behavior     Controlled Symptoms of Anxiety: no excessive anxiety/worry/fear; with no current symptoms of restlessness, fatigue, poor concentration, irritability, muscle tension, or sleep disturbance; no panic attacks; without agoraphobia; no Social Anxiety     Weight problems- currently at 211 lbs again, she was at 211 , 220  and 215 lbs at prior visits; she was at 250 lbs on 9/30/14.     She reports that she has not used nicotine since 1/21/19; she has found wellbutrin to be helpful.     Psychotherapy:  Target symptoms: depression, anxiety   Why chosen therapy is appropriate versus another modality: relevant to diagnosis, patient responds to this modality, evidence based practice  Outcome monitoring methods: self-report, observation  Therapeutic intervention type: insight oriented psychotherapy, behavior modifying psychotherapy, supportive psychotherapy, interactive psychotherapy  Topics discussed/themes: stress related to medical comorbidities, difficulty managing affect in interpersonal relationships, building  "skills sets for symptom management, symptom recognition  The patient's response to the intervention is accepting. The patient's progress toward treatment goals is good.   Duration of intervention: 1 minutes.    Review of Systems   PSYCHIATRIC: Pertinant items are noted in the narrative.  CONSTITUTIONAL: No weight gain or loss.   MUSCULOSKELETAL: No pain or stiffness of the joints.  NEUROLOGIC: No weakness, sensory changes, seizures, confusion, memory loss, tremor or other abnormal movements.  ENDOCRINE: No polydipsia or polyuria.  INTEGUMENTARY: No rashes or lacerations.  EYES: No exophthalmos, jaundice or blindness.  ENT: No dizziness, tinnitus or hearing loss.  RESPIRATORY: No shortness of breath.  CARDIOVASCULAR: No tachycardia or chest pain.  GASTROINTESTINAL: No nausea, vomiting, pain, constipation or diarrhea.  GENITOURINARY: No frequency, dysuria or sexual dysfunction.  HEMATOLOGIC/LYMPHATIC: No excessive bleeding, prolonged or excessive bleeding after dental extraction/injury.  ALLERGIC/IMMUNOLOGIC: No allergic response to materials, foods or animals at this time.    Past Medical, Family and Social History: The patient's past medical, family and social history have been reviewed and updated as appropriate within the electronic medical record - see encounter notes.    Compliance: yes    Side effects: see above    Risk Parameters:  Patient reports no suicidal ideation  Patient reports no homicidal ideation  Patient reports no self-injurious behavior  Patient reports no violent behavior    Exam (detailed: at least 9 elements; comprehensive: all 15 elements)   Constitutional  Vitals:  Most recent vital signs, dated less than 90 days prior to this appointment, were reviewed.   Vitals:    06/30/23 1023   BP: 114/74   Pulse: 74   Resp: 17   SpO2: 98%   Weight: 96.1 kg (211 lb 14.4 oz)   Height: 5' 7" (1.702 m)         Body mass index is 33.19 kg/m².       General:  unremarkable, age appropriate, well nourished, " "casually dressed, neatly groomed, obese     Musculoskeletal  Muscle Strength/Tone:  no dyskinesia, no tremor, no tic   Gait & Station:  non-ataxic     Psychiatric  Speech:  no latency; no press, spontaneous   Mood & Affect:  steady, euthymic"good"  congruent and appropriate, full, bright   Thought Process:  normal and logical, goal-directed   Associations:  intact   Thought Content:  normal, no suicidality, no homicidality, delusions, or paranoia   Insight:  intact, has awareness of illness   Judgement: behavior is adequate to circumstances, age appropriate   Orientation:  person, place, situation, time/date, day of week, month of year, year   Memory: intact for content of interview, able to remember recent events- yes, able to remember remote events- yes   Language: grossly intact, able to name, able to repeat   Attention Span & Concentration:  able to focus, completed tasks   Fund of Knowledge:  intact and appropriate to age and level of education, familiar with aspects of current personal life     Assessment and Diagnosis   Status/Progress: Based on the examination today, the patient's problem(s) is/are improved.  New problems have not been presented today.   Co-morbidities are complicating management of the primary condition.  There are no active rule-out diagnoses for this patient at this time.     General Impression:     Unspecified Mood Disorder  Unspecified Anxiety Disorder     Morbid obesity   Nicotine Dependence in sustained full remission     Pain Disorder      Psychosocial stressors     Medical issues:   Knee s/p replacement  Back pain  Menopause       Intervention/Counseling/Treatment Plan   Medication Management: The risks and benefits of medication were discussed with the patient.  Counseling provided with patient as follows: importance of compliance with chosen treatment options was emphasized, risks and benefits of treatment options, including medications, were discussed with the patient, risk " factor reduction, prognosis, patient education, instructions for  management, treatment and follow-up were reviewed    Medication/Counseling:      Mood: pt counseled  -Increase Bupropion SR from 100 to 150 mg po q day- for depression and nicotine cessation  -Continue Lamictal at 100 mg po q AM and 200 mg po q HS  -cymblata as below     Anxiety: pt counseled  Continue Cymbalta  60 mg po q AM for depression/anxiety/chronic pain  -start vistaril 25 mg po q 6 hours prn     Obesity: pt counseled  -wellbutrin off-label    Pain Disorder: pt counseled  -cymbalta     Nicotine use: pt counseled  -encouraged to sustain remission  Tobacco Use Treatment Practical Counseling (5 minutes)    Were the following discussed with the patient:    Recognizing danger situations:    A. Alcohol use during the first month after quitting - Yes   B. Being around smoke and/or smokers or time/situations when the patient routinely smoked - Yes   C. Triggers and/or roadblocks are the same as danger situations - Yes    2.   Developing coping skills   A. Learning new ways to manage stress - Yes   B. Exercising and/or relaxation breathing - Yes   C. Changing routines - Yes   D. Distraction techniques to prevent tobacco use - Yes    3.   Basic information about quitting:   A. Benefits of quitting tobacco - Yes   B. How to quit techniques - Yes   C. Available resources to support quitting - Yes         Psychosocial stressors: pt counseled      Medical issues: pt counseled  -continue tx per providers     Discussed diagnosis, risks and benefits of proposed treatment vs alternative treatments vs no treatment, and potential side effects of these treatments.  The patient expresses understanding of the above and displays the capacity to agree with this treatment given said understanding.  Patient also agrees that, currently, the benefits outweigh the risks and would like to pursue treatment at this time       Psychotherapy:  Psychotherapy provided today; will  provide in our sessions as needed  Pt declined referral for weekly psychotherapy        Labs:  Reviewed labs from 5/17/23 with patient     Approximately 60 minutes of total time spent on the encounter, which includes face to face time and non-face to face time preparing to see the patient (eg, review of tests), Obtaining and/or reviewing separately obtained history, Documenting clinical information in the electronic or other health record, Independently interpreting results (not separately reported) and communicating results to the patient/family/caregiver, or Care coordination (not separately reported).   Approximately 40 minutes were spent as above; approximately 16 minutes were spent on psychotherapy; approximately 5 minutes were spent on smoking cessation counseling           Return to Clinic: 6 months, sooner if needed      Jey Og MD  Psychiatry

## 2023-07-03 ENCOUNTER — TELEPHONE (OUTPATIENT)
Dept: PSYCHIATRY | Facility: CLINIC | Age: 58
End: 2023-07-03
Payer: COMMERCIAL

## 2023-07-03 NOTE — TELEPHONE ENCOUNTER
----- Message from Aster Amaya sent at 7/3/2023  8:21 AM CDT -----  Contact: PATIENT  Tiffany Hendrix  MRN: [id number]  : 1965  PCP: Fabio Celeste  Home Phone      [phone]  Work Phone      Not on file.  Mobile          [phone]      MESSAGE: Patient states that Dr. Og increased the Wellbutrin 100 mg to 1.5 BID but there is no way for her to cut it in half, it does not have a score line.  She also thinks that he wants her to increase the Vistaril but is not sure to how much.  She would also like to know how to get a patient transferred from a facility in [place] to [place].      Phone: [phone]

## 2023-07-03 NOTE — TELEPHONE ENCOUNTER
Informed patient that once a patient is admitted into a Inpatient setting, we can not get them transferred. She voiced understanding. She also has questions in regards to her medications. Reviewed from last office visit and she v/u.

## 2023-07-07 RX ORDER — HYDROXYZINE PAMOATE 25 MG/1
25 CAPSULE ORAL EVERY 6 HOURS PRN
Qty: 60 CAPSULE | Refills: 1 | Status: SHIPPED | OUTPATIENT
Start: 2023-07-07 | End: 2023-07-11 | Stop reason: SDUPTHER

## 2023-07-11 ENCOUNTER — PATIENT MESSAGE (OUTPATIENT)
Dept: PSYCHIATRY | Facility: CLINIC | Age: 58
End: 2023-07-11
Payer: COMMERCIAL

## 2023-07-11 RX ORDER — HYDROXYZINE PAMOATE 50 MG/1
50 CAPSULE ORAL EVERY 6 HOURS PRN
Qty: 90 CAPSULE | Refills: 1 | Status: SHIPPED | OUTPATIENT
Start: 2023-07-11 | End: 2023-07-28

## 2023-07-11 NOTE — TELEPHONE ENCOUNTER
Patient is doing 50 mg every 6 hours, she would like an updated script sent in. Updated and pended for your review.

## 2023-07-28 ENCOUNTER — OFFICE VISIT (OUTPATIENT)
Dept: PSYCHIATRY | Facility: CLINIC | Age: 58
End: 2023-07-28
Payer: COMMERCIAL

## 2023-07-28 VITALS
RESPIRATION RATE: 17 BRPM | WEIGHT: 212.88 LBS | BODY MASS INDEX: 33.41 KG/M2 | HEART RATE: 65 BPM | DIASTOLIC BLOOD PRESSURE: 74 MMHG | HEIGHT: 67 IN | OXYGEN SATURATION: 99 % | SYSTOLIC BLOOD PRESSURE: 116 MMHG

## 2023-07-28 DIAGNOSIS — F41.9 ANXIETY: Primary | ICD-10-CM

## 2023-07-28 DIAGNOSIS — F32.89 OTHER DEPRESSION: ICD-10-CM

## 2023-07-28 PROCEDURE — 90833 PSYTX W PT W E/M 30 MIN: CPT | Mod: S$GLB,,, | Performed by: PSYCHIATRY & NEUROLOGY

## 2023-07-28 PROCEDURE — 99215 OFFICE O/P EST HI 40 MIN: CPT | Mod: S$GLB,,, | Performed by: PSYCHIATRY & NEUROLOGY

## 2023-07-28 PROCEDURE — 90833 PR PSYCHOTHERAPY W/PATIENT W/E&M, 30 MIN (ADD ON): ICD-10-PCS | Mod: S$GLB,,, | Performed by: PSYCHIATRY & NEUROLOGY

## 2023-07-28 PROCEDURE — 99215 PR OFFICE/OUTPT VISIT, EST, LEVL V, 40-54 MIN: ICD-10-PCS | Mod: S$GLB,,, | Performed by: PSYCHIATRY & NEUROLOGY

## 2023-07-28 PROCEDURE — 99999 PR PBB SHADOW E&M-EST. PATIENT-LVL III: CPT | Mod: PBBFAC,,, | Performed by: PSYCHIATRY & NEUROLOGY

## 2023-07-28 PROCEDURE — 99999 PR PBB SHADOW E&M-EST. PATIENT-LVL III: ICD-10-PCS | Mod: PBBFAC,,, | Performed by: PSYCHIATRY & NEUROLOGY

## 2023-07-28 RX ORDER — HYDROXYZINE PAMOATE 50 MG/1
50 CAPSULE ORAL EVERY 6 HOURS PRN
Qty: 120 CAPSULE | Refills: 2 | Status: SHIPPED | OUTPATIENT
Start: 2023-07-28 | End: 2023-10-30

## 2023-07-28 RX ORDER — ERYTHROMYCIN AND BENZOYL PEROXIDE 30; 50 MG/G; MG/G
GEL TOPICAL
COMMUNITY
Start: 2023-07-23

## 2023-07-28 NOTE — PROGRESS NOTES
"Outpatient Psychiatry Follow-Up Visit (MD/NP)    7/28/2023    Clinical Status of Patient:  Outpatient (Ambulatory)    Chief Complaint:  Tiffany Hendrix is a 58 y.o. female who presents today for follow-up of depression and anxiety.  Met with patient.      Interval History and Content of Current Session:  Interim Events/Subjective Report/Content of Current Session: The patient was seen and examined; her chart was reviewed. Reviewed notes by Ruth Hightower MA at 7/3/2023 10:37 AM; and Ruth Hightower MA at 7/11/2023  9:14 AM    She has been compliant with medications. She denied ay side effects. She reports good efficacy and tolerability.She found the previous adjustments helpful.      She had one new-medical stressors since last seen in clinic.  She has some spinal abnormalities (buldging discs, bone spurs). She continues with chronic back pain- it has been variable over the last few months.  She remains on estrogen with noted benefit on weight and hot flashes- there may be a change in her meds in the near future.   -she had finished PT; she had some "back nerves burnt" which helped with pain     New psychosocial stressors have occurred since last seen. Family and marriage remain stable and supportive. Most of children are doing well- they are in school; her daughter graduated HS and at [employer] (doing well). Finances and housing are stable.   -her daughter continues to have mental health issues     She previously found the increase in lamictal helpful with mood lability. Symptoms remain well controlled since her last appointment, "I'm doing pretty good considering." She would like to continue tx without changes as documented below. Her pain is doing much better  -her daughter had a psychotic break- she is home form the hospital and making slow progress- the patient  is currenlty coping well with this    Controlled Symptoms of Depression: no diminished mood or loss of interest/anhedonia; irritability, diminished " energy, change in sleep, change in appetite, diminished concentration or cognition or indecisiveness, PMA/R, excessive guilt or hopelessness or worthlessness, suicidal ideations     Controlled Changes in Sleep: no trouble with initiation, maintenance, early morning awakening with inability to return to sleep, or hypersomnolence      Denied Suicidal/Homicidal ideations: no active/passive ideations, organized plans, or future intentions     Denied Suicidal/Homicidal ideations: no active/passive ideations, organized plans, or future intentions     Denied Symptoms of psychosis: no hallucinations, delusions, disorganized thinking, disorganized behavior or abnormal motor behavior, or negative symptoms      Denied past or current Symptoms of aron or hypomania: no elevated, expansive, or irritable mood with increased energy or activity; with no inflated self-esteem or grandiosity, decreased need for sleep, increased rate of speech, FOI or racing thoughts, distractibility, increased goal directed activity or PMA, or risky/disinhibited behavior     Controlled Symptoms of Anxiety: no excessive anxiety/worry/fear; with no current symptoms of restlessness, fatigue, poor concentration, irritability, muscle tension, or sleep disturbance; no panic attacks; without agoraphobia; no Social Anxiety     Weight problems- currently at 212 lbs again, she was at 211 , 220  and 215 lbs at prior visits; she was at 250 lbs on 9/30/14.     She reports that she has not used nicotine since 1/21/19; she has found wellbutrin to be helpful.     Psychotherapy:  Target symptoms: depression, anxiety   Why chosen therapy is appropriate versus another modality: relevant to diagnosis, patient responds to this modality, evidence based practice  Outcome monitoring methods: self-report, observation  Therapeutic intervention type: insight oriented psychotherapy, behavior modifying psychotherapy, supportive psychotherapy, interactive psychotherapy  Topics  discussed/themes: stress related to medical comorbidities, difficulty managing affect in interpersonal relationships, building skills sets for symptom management, symptom recognition  The patient's response to the intervention is accepting. The patient's progress toward treatment goals is good.   Duration of intervention: 16 minutes.    Review of Systems   PSYCHIATRIC: Pertinant items are noted in the narrative.  CONSTITUTIONAL: No weight gain or loss.   MUSCULOSKELETAL: No pain or stiffness of the joints.  NEUROLOGIC: No weakness, sensory changes, seizures, confusion, memory loss, tremor or other abnormal movements.  ENDOCRINE: No polydipsia or polyuria.  INTEGUMENTARY: No rashes or lacerations.  EYES: No exophthalmos, jaundice or blindness.  ENT: No dizziness, tinnitus or hearing loss.  RESPIRATORY: No shortness of breath.  CARDIOVASCULAR: No tachycardia or chest pain.  GASTROINTESTINAL: No nausea, vomiting, pain, constipation or diarrhea.  GENITOURINARY: No frequency, dysuria or sexual dysfunction.  HEMATOLOGIC/LYMPHATIC: No excessive bleeding, prolonged or excessive bleeding after dental extraction/injury.  ALLERGIC/IMMUNOLOGIC: No allergic response to materials, foods or animals at this time.    Past Medical, Family and Social History: The patient's past medical, family and social history have been reviewed and updated as appropriate within the electronic medical record - see encounter notes.    Compliance: yes    Side effects: see above    Risk Parameters:  Patient reports no suicidal ideation  Patient reports no homicidal ideation  Patient reports no self-injurious behavior  Patient reports no violent behavior    Exam (detailed: at least 9 elements; comprehensive: all 15 elements)   Constitutional  Vitals:  Most recent vital signs, dated less than 90 days prior to this appointment, were reviewed.   Vitals:    07/28/23 1103   BP: 116/74   Pulse: 65   Resp: 17   SpO2: 99%   Weight: 96.6 kg (212 lb 14.4 oz)  "  Height: 5' 7" (1.702 m)         Body mass index is 33.34 kg/m².       General:  unremarkable, age appropriate, well nourished, casually dressed, neatly groomed, obese     Musculoskeletal  Muscle Strength/Tone:  no dyskinesia, no tremor, no tic   Gait & Station:  non-ataxic     Psychiatric  Speech:  no latency; no press, spontaneous   Mood & Affect:  steady, euthymic"good"  congruent and appropriate, full, bright   Thought Process:  normal and logical, goal-directed   Associations:  intact   Thought Content:  normal, no suicidality, no homicidality, delusions, or paranoia   Insight:  intact, has awareness of illness   Judgement: behavior is adequate to circumstances, age appropriate   Orientation:  person, place, situation, time/date, day of week, month of year, year   Memory: intact for content of interview, able to remember recent events- yes, able to remember remote events- yes   Language: grossly intact, able to name, able to repeat   Attention Span & Concentration:  able to focus, completed tasks   Fund of Knowledge:  intact and appropriate to age and level of education, familiar with aspects of current personal life     Assessment and Diagnosis   Status/Progress: Based on the examination today, the patient's problem(s) is/are improved.  New problems have not been presented today.   Co-morbidities are complicating management of the primary condition.  There are no active rule-out diagnoses for this patient at this time.     General Impression:     Unspecified Mood Disorder  Unspecified Anxiety Disorder     Morbid obesity   Nicotine Dependence in sustained full remission     Pain Disorder      Psychosocial stressors     Medical issues:   Knee s/p replacement  Back pain  Menopause       Intervention/Counseling/Treatment Plan   Medication Management: The risks and benefits of medication were discussed with the patient.  Counseling provided with patient as follows: importance of compliance with chosen treatment " options was emphasized, risks and benefits of treatment options, including medications, were discussed with the patient, risk factor reduction, prognosis, patient education, instructions for  management, treatment and follow-up were reviewed    Medication/Counseling:      Mood: pt counseled  -Continue Bupropion SR f150 mg po q day- for depression and nicotine cessation  -Continue Lamictal at 100 mg po q AM and 200 mg po q HS  -cymblata as below     Anxiety: pt counseled  Continue Cymbalta  60 mg po q AM for depression/anxiety/chronic pain  -start vistaril 25 mg po q 6 hours prn- increased to 50 mg po q 6 hours prn     Obesity: pt counseled  -wellbutrin off-label    Pain Disorder: pt counseled  -cymbalta     Nicotine use: pt counseled  -encouraged to sustain remission     Psychosocial stressors: pt counseled      Medical issues: pt counseled  -continue tx per providers     Discussed diagnosis, risks and benefits of proposed treatment vs alternative treatments vs no treatment, and potential side effects of these treatments.  The patient expresses understanding of the above and displays the capacity to agree with this treatment given said understanding.  Patient also agrees that, currently, the benefits outweigh the risks and would like to pursue treatment at this time       Psychotherapy:  Psychotherapy provided today; will provide in our sessions as needed  Pt declined referral for weekly psychotherapy        Labs:  Reviewed labs from 5/17/23 with patient         Return to Clinic: 3 months, sooner if needed      Jey Og MD  Psychiatry

## 2023-09-07 RX ORDER — BUPROPION HYDROCHLORIDE 100 MG/1
100 TABLET ORAL 2 TIMES DAILY
Qty: 180 TABLET | Refills: 1 | Status: SHIPPED | OUTPATIENT
Start: 2023-09-07 | End: 2023-10-23 | Stop reason: SDUPTHER

## 2023-10-23 ENCOUNTER — OFFICE VISIT (OUTPATIENT)
Dept: PSYCHIATRY | Facility: CLINIC | Age: 58
End: 2023-10-23
Payer: COMMERCIAL

## 2023-10-23 VITALS
OXYGEN SATURATION: 98 % | BODY MASS INDEX: 33.48 KG/M2 | HEIGHT: 67 IN | WEIGHT: 213.31 LBS | SYSTOLIC BLOOD PRESSURE: 118 MMHG | DIASTOLIC BLOOD PRESSURE: 78 MMHG | HEART RATE: 69 BPM | RESPIRATION RATE: 17 BRPM

## 2023-10-23 DIAGNOSIS — F32.89 OTHER DEPRESSION: ICD-10-CM

## 2023-10-23 DIAGNOSIS — F41.9 ANXIETY: Primary | ICD-10-CM

## 2023-10-23 PROCEDURE — 99999 PR PBB SHADOW E&M-EST. PATIENT-LVL III: ICD-10-PCS | Mod: PBBFAC,,, | Performed by: PSYCHIATRY & NEUROLOGY

## 2023-10-23 PROCEDURE — 99214 OFFICE O/P EST MOD 30 MIN: CPT | Mod: S$GLB,,, | Performed by: PSYCHIATRY & NEUROLOGY

## 2023-10-23 PROCEDURE — 90833 PSYTX W PT W E/M 30 MIN: CPT | Mod: S$GLB,,, | Performed by: PSYCHIATRY & NEUROLOGY

## 2023-10-23 PROCEDURE — 99214 PR OFFICE/OUTPT VISIT, EST, LEVL IV, 30-39 MIN: ICD-10-PCS | Mod: S$GLB,,, | Performed by: PSYCHIATRY & NEUROLOGY

## 2023-10-23 PROCEDURE — 99999 PR PBB SHADOW E&M-EST. PATIENT-LVL III: CPT | Mod: PBBFAC,,, | Performed by: PSYCHIATRY & NEUROLOGY

## 2023-10-23 PROCEDURE — 90833 PR PSYCHOTHERAPY W/PATIENT W/E&M, 30 MIN (ADD ON): ICD-10-PCS | Mod: S$GLB,,, | Performed by: PSYCHIATRY & NEUROLOGY

## 2023-10-23 RX ORDER — DULOXETIN HYDROCHLORIDE 60 MG/1
60 CAPSULE, DELAYED RELEASE ORAL DAILY
Qty: 90 CAPSULE | Refills: 1 | Status: SHIPPED | OUTPATIENT
Start: 2023-10-23 | End: 2024-01-23 | Stop reason: SDUPTHER

## 2023-10-23 RX ORDER — LAMOTRIGINE 100 MG/1
TABLET ORAL
Qty: 270 TABLET | Refills: 1 | Status: SHIPPED | OUTPATIENT
Start: 2023-10-23 | End: 2024-01-23 | Stop reason: SDUPTHER

## 2023-10-23 RX ORDER — BUPROPION HYDROCHLORIDE 100 MG/1
100 TABLET ORAL 3 TIMES DAILY
Qty: 270 TABLET | Refills: 1 | Status: SHIPPED | OUTPATIENT
Start: 2023-10-23 | End: 2024-01-23 | Stop reason: SDUPTHER

## 2023-10-23 NOTE — PROGRESS NOTES
"Outpatient Psychiatry Follow-Up Visit (MD/NP)    10/23/2023    Clinical Status of Patient:  Outpatient (Ambulatory)    Chief Complaint:  Tiffany Hendrix is a 58 y.o. female who presents today for follow-up of depression and anxiety.  Met with patient.      Interval History and Content of Current Session:  Interim Events/Subjective Report/Content of Current Session: The patient was seen and examined; her chart was reviewed. Reviewed notes by Ruth Hightower MA at 7/3/2023 10:37 AM; and Ruth Hightower MA at 7/11/2023  9:14 AM    She has been compliant with medications. She denied ay side effects. She reports good efficacy and tolerability.She found the previous adjustments helpful.      She had one new-medical stressors since last seen in clinic.  She has some spinal abnormalities (buldging discs, bone spurs). She continues with chronic back pain- it has been variable over the last few months.  She remains on estrogen with noted benefit on weight and hot flashes- there may be a change in her meds in the near future.   -she had finished PT; she had some "back nerves burnt" which helped with pain- no recent changes in pain management; she continues taking injections periodically      New psychosocial stressors have occurred since last seen. Family and marriage remain stable and supportive. Most of children are doing well- they are in school; her daughter graduated HS and at [employer] (doing well). Finances and housing are stable.   -her daughter continues to have mental health issues- her daughter is doing much better     She previously found the increase in lamictal helpful with mood lability. Symptoms remain well controlled since her last appointment, "I'm doing pretty good considering." She would like to continue tx without changes as documented below. Her pain is doing much better  -her daughter had a psychotic break- she is home form the hospital and making slow progress- the patient  is currenlty coping well with " this    Controlled Symptoms of Depression: no diminished mood or loss of interest/anhedonia; irritability, diminished energy, change in sleep, change in appetite, diminished concentration or cognition or indecisiveness, PMA/R, excessive guilt or hopelessness or worthlessness, suicidal ideations     Controlled Changes in Sleep: no trouble with initiation, maintenance, early morning awakening with inability to return to sleep, or hypersomnolence      Denied Suicidal/Homicidal ideations: no active/passive ideations, organized plans, or future intentions     Denied Suicidal/Homicidal ideations: no active/passive ideations, organized plans, or future intentions     Denied Symptoms of psychosis: no hallucinations, delusions, disorganized thinking, disorganized behavior or abnormal motor behavior, or negative symptoms      Denied past or current Symptoms of aron or hypomania: no elevated, expansive, or irritable mood with increased energy or activity; with no inflated self-esteem or grandiosity, decreased need for sleep, increased rate of speech, FOI or racing thoughts, distractibility, increased goal directed activity or PMA, or risky/disinhibited behavior     Controlled Symptoms of Anxiety: no excessive anxiety/worry/fear; with no current symptoms of restlessness, fatigue, poor concentration, irritability, muscle tension, or sleep disturbance; no panic attacks; without agoraphobia; no Social Anxiety     Weight problems- currently at 213 lbs, she was at 211 , 220  and 215 lbs at prior visits; she was at 250 lbs on 9/30/14.     She reports that she has not used nicotine since 1/21/19; she has found wellbutrin to be helpful.     Psychotherapy:  Target symptoms: depression, anxiety   Why chosen therapy is appropriate versus another modality: relevant to diagnosis, patient responds to this modality, evidence based practice  Outcome monitoring methods: self-report, observation  Therapeutic intervention type: insight oriented  psychotherapy, behavior modifying psychotherapy, supportive psychotherapy, interactive psychotherapy  Topics discussed/themes: stress related to medical comorbidities, difficulty managing affect in interpersonal relationships, building skills sets for symptom management, symptom recognition  The patient's response to the intervention is accepting. The patient's progress toward treatment goals is good.   Duration of intervention: 16 minutes.    Review of Systems   PSYCHIATRIC: Pertinant items are noted in the narrative.  CONSTITUTIONAL: No weight gain or loss.   MUSCULOSKELETAL: No pain or stiffness of the joints.  NEUROLOGIC: No weakness, sensory changes, seizures, confusion, memory loss, tremor or other abnormal movements.  ENDOCRINE: No polydipsia or polyuria.  INTEGUMENTARY: No rashes or lacerations.  EYES: No exophthalmos, jaundice or blindness.  ENT: No dizziness, tinnitus or hearing loss.  RESPIRATORY: No shortness of breath.  CARDIOVASCULAR: No tachycardia or chest pain.  GASTROINTESTINAL: No nausea, vomiting, pain, constipation or diarrhea.  GENITOURINARY: No frequency, dysuria or sexual dysfunction.  HEMATOLOGIC/LYMPHATIC: No excessive bleeding, prolonged or excessive bleeding after dental extraction/injury.  ALLERGIC/IMMUNOLOGIC: No allergic response to materials, foods or animals at this time.    Past Medical, Family and Social History: The patient's past medical, family and social history have been reviewed and updated as appropriate within the electronic medical record - see encounter notes.    Compliance: yes    Side effects: see above    Risk Parameters:  Patient reports no suicidal ideation  Patient reports no homicidal ideation  Patient reports no self-injurious behavior  Patient reports no violent behavior    Exam (detailed: at least 9 elements; comprehensive: all 15 elements)   Constitutional  Vitals:  Most recent vital signs, dated less than 90 days prior to this appointment, were reviewed.  "  Vitals:    10/23/23 1059   BP: 118/78   Pulse: 69   Resp: 17   SpO2: 98%   Weight: 96.8 kg (213 lb 4.8 oz)   Height: 5' 7" (1.702 m)       Body mass index is 33.41 kg/m².       General:  unremarkable, age appropriate, well nourished, casually dressed, neatly groomed, obese     Musculoskeletal  Muscle Strength/Tone:  no dyskinesia, no tremor, no tic   Gait & Station:  non-ataxic     Psychiatric  Speech:  no latency; no press, spontaneous   Mood & Affect:  steady, euthymic"good"  congruent and appropriate, full, bright   Thought Process:  normal and logical, goal-directed   Associations:  intact   Thought Content:  normal, no suicidality, no homicidality, delusions, or paranoia   Insight:  intact, has awareness of illness   Judgement: behavior is adequate to circumstances, age appropriate   Orientation:  person, place, situation, time/date, day of week, month of year, year   Memory: intact for content of interview, able to remember recent events- yes, able to remember remote events- yes   Language: grossly intact, able to name, able to repeat   Attention Span & Concentration:  able to focus, completed tasks   Fund of Knowledge:  intact and appropriate to age and level of education, familiar with aspects of current personal life     Assessment and Diagnosis   Status/Progress: Based on the examination today, the patient's problem(s) is/are improved.  New problems have not been presented today.   Co-morbidities are complicating management of the primary condition.  There are no active rule-out diagnoses for this patient at this time.     General Impression:     Unspecified Mood Disorder  Unspecified Anxiety Disorder     Morbid obesity   Nicotine Dependence in sustained full remission     Pain Disorder      Psychosocial stressors     Medical issues:   Knee s/p replacement  Back pain  Menopause       Intervention/Counseling/Treatment Plan   Medication Management: The risks and benefits of medication were discussed with " the patient.  Counseling provided with patient as follows: importance of compliance with chosen treatment options was emphasized, risks and benefits of treatment options, including medications, were discussed with the patient, risk factor reduction, prognosis, patient education, instructions for  management, treatment and follow-up were reviewed    Medication/Counseling:      Mood: pt counseled  -Continue Bupropion  mg po q day- for depression and nicotine cessation- change to  mg po TID  -Continue Lamictal at 100 mg po q AM and 200 mg po q HS  -cymblata as below     Anxiety: pt counseled  Continue Cymbalta  60 mg po q AM for depression/anxiety/chronic pain  -start vistaril 25 mg po q 6 hours prn- increased to 50 mg po q 6 hours prn     Obesity: pt counseled  -wellbutrin off-label    Pain Disorder: pt counseled  -cymbalta     Nicotine use: pt counseled  -encouraged to sustain remission     Psychosocial stressors: pt counseled      Medical issues: pt counseled  -continue tx per providers     Discussed diagnosis, risks and benefits of proposed treatment vs alternative treatments vs no treatment, and potential side effects of these treatments.  The patient expresses understanding of the above and displays the capacity to agree with this treatment given said understanding.  Patient also agrees that, currently, the benefits outweigh the risks and would like to pursue treatment at this time       Psychotherapy:  Psychotherapy provided today; will provide in our sessions as needed  Pt declined referral for weekly psychotherapy        Labs:  Reviewed labs from 5/17/23 with patient         Return to Clinic: 3 months, sooner if needed      Jey Og MD  Psychiatry

## 2023-10-30 RX ORDER — HYDROXYZINE PAMOATE 50 MG/1
50 CAPSULE ORAL EVERY 6 HOURS PRN
Qty: 360 CAPSULE | Refills: 0 | Status: SHIPPED | OUTPATIENT
Start: 2023-10-30 | End: 2024-01-23 | Stop reason: SDUPTHER

## 2024-01-23 ENCOUNTER — OFFICE VISIT (OUTPATIENT)
Dept: PSYCHIATRY | Facility: CLINIC | Age: 59
End: 2024-01-23
Payer: COMMERCIAL

## 2024-01-23 VITALS
BODY MASS INDEX: 33.09 KG/M2 | OXYGEN SATURATION: 99 % | HEART RATE: 67 BPM | DIASTOLIC BLOOD PRESSURE: 81 MMHG | WEIGHT: 210.81 LBS | HEIGHT: 67 IN | SYSTOLIC BLOOD PRESSURE: 118 MMHG | RESPIRATION RATE: 17 BRPM

## 2024-01-23 DIAGNOSIS — F32.89 OTHER DEPRESSION: Primary | ICD-10-CM

## 2024-01-23 DIAGNOSIS — F41.9 ANXIETY: ICD-10-CM

## 2024-01-23 PROCEDURE — 99214 OFFICE O/P EST MOD 30 MIN: CPT | Mod: S$GLB,,, | Performed by: PSYCHIATRY & NEUROLOGY

## 2024-01-23 PROCEDURE — 90833 PSYTX W PT W E/M 30 MIN: CPT | Mod: S$GLB,,, | Performed by: PSYCHIATRY & NEUROLOGY

## 2024-01-23 PROCEDURE — 99999 PR PBB SHADOW E&M-EST. PATIENT-LVL III: CPT | Mod: PBBFAC,,, | Performed by: PSYCHIATRY & NEUROLOGY

## 2024-01-23 RX ORDER — HYDROXYZINE PAMOATE 50 MG/1
50 CAPSULE ORAL EVERY 6 HOURS PRN
Qty: 360 CAPSULE | Refills: 1 | Status: SHIPPED | OUTPATIENT
Start: 2024-01-23 | End: 2024-05-14

## 2024-01-23 RX ORDER — METOPROLOL SUCCINATE 50 MG/1
50 TABLET, EXTENDED RELEASE ORAL DAILY
Status: ON HOLD | COMMUNITY
Start: 2024-01-03 | End: 2024-05-25 | Stop reason: HOSPADM

## 2024-01-23 RX ORDER — DULOXETIN HYDROCHLORIDE 60 MG/1
60 CAPSULE, DELAYED RELEASE ORAL DAILY
Qty: 90 CAPSULE | Refills: 1 | Status: SHIPPED | OUTPATIENT
Start: 2024-01-23

## 2024-01-23 RX ORDER — LAMOTRIGINE 100 MG/1
TABLET ORAL
Qty: 270 TABLET | Refills: 1 | Status: SHIPPED | OUTPATIENT
Start: 2024-01-23

## 2024-01-23 RX ORDER — BUPROPION HYDROCHLORIDE 100 MG/1
100 TABLET ORAL 3 TIMES DAILY
Qty: 270 TABLET | Refills: 1 | Status: SHIPPED | OUTPATIENT
Start: 2024-01-23

## 2024-01-23 NOTE — PROGRESS NOTES
"Outpatient Psychiatry Follow-Up Visit (MD/NP)    1/23/2024    Clinical Status of Patient:  Outpatient (Ambulatory)    Chief Complaint:  Tiffany Hendrix is a 58 y.o. female who presents today for follow-up of depression and anxiety.  Met with patient.      Interval History and Content of Current Session:  Interim Events/Subjective Report/Content of Current Session: The patient was seen and examined; her chart was reviewed. Previously Reviewed notes by Ruth Hightower MA at 7/3/2023 10:37 AM; and Ruth Hightower MA at 7/11/2023  9:14 AM  -no new notes were charted since her last appointment    She has been compliant with medications. She denied ay side effects. She reports good efficacy and tolerability.She found the previous adjustments helpful.      She had one new-medical stressors since last seen in clinic.  She has some spinal abnormalities (buldging discs, bone spurs). She continues with chronic back pain- it has been variable over the last few months.  She remains on estrogen with noted benefit on weight and hot flashes- there may be a change in her meds in the near future.        No New psychosocial stressors have occurred since last seen. Family and marriage remain stable and supportive. Most of children are doing well- they are in school; her daughter graduated HS and at [employer] (doing well). Finances and housing are stable.   -her daughter continues to have mental health issues- her daughter is doing much better but is still struggling   -her parents are having health issues     She previously found the recent med changes helpful with mood lability. Symptoms remain well controlled since her last appointment, "I'm doing pretty good." She would like to continue tx without changes as documented below. Her pain is doing much better  -her daughter had a psychotic break- she is home form the hospital and making slow progress- the patient  is currenlty coping well with this    Controlled Symptoms of Depression: " no diminished mood or loss of interest/anhedonia; irritability, diminished energy, change in sleep, change in appetite, diminished concentration or cognition or indecisiveness, PMA/R, excessive guilt or hopelessness or worthlessness, suicidal ideations     Controlled Changes in Sleep: no trouble with initiation, maintenance, early morning awakening with inability to return to sleep, or hypersomnolence      Denied Suicidal/Homicidal ideations: no active/passive ideations, organized plans, or future intentions     Denied Suicidal/Homicidal ideations: no active/passive ideations, organized plans, or future intentions     Denied Symptoms of psychosis: no hallucinations, delusions, disorganized thinking, disorganized behavior or abnormal motor behavior, or negative symptoms      Denied past or current Symptoms of aron or hypomania: no elevated, expansive, or irritable mood with increased energy or activity; with no inflated self-esteem or grandiosity, decreased need for sleep, increased rate of speech, FOI or racing thoughts, distractibility, increased goal directed activity or PMA, or risky/disinhibited behavior     Controlled Symptoms of Anxiety: no excessive anxiety/worry/fear; with no current symptoms of restlessness, fatigue, poor concentration, irritability, muscle tension, or sleep disturbance; no panic attacks; without agoraphobia; no Social Anxiety     Weight problems- currently at 213 lbs, she was at 211 , 220  and 215 lbs at prior visits; she was at 250 lbs on 9/30/14.     She reports that she has not used nicotine since 1/21/19; she has found wellbutrin to be helpful.     Psychotherapy:  Target symptoms: depression, anxiety   Why chosen therapy is appropriate versus another modality: relevant to diagnosis, patient responds to this modality, evidence based practice  Outcome monitoring methods: self-report, observation  Therapeutic intervention type: insight oriented psychotherapy, behavior modifying  psychotherapy, supportive psychotherapy, interactive psychotherapy  Topics discussed/themes: stress related to medical comorbidities, difficulty managing affect in interpersonal relationships, building skills sets for symptom management, symptom recognition  The patient's response to the intervention is accepting. The patient's progress toward treatment goals is good.   Duration of intervention: 16 minutes.    Review of Systems   PSYCHIATRIC: Pertinant items are noted in the narrative.  CONSTITUTIONAL: No weight gain or loss.   MUSCULOSKELETAL: No pain or stiffness of the joints.  NEUROLOGIC: No weakness, sensory changes, seizures, confusion, memory loss, tremor or other abnormal movements.  ENDOCRINE: No polydipsia or polyuria.  INTEGUMENTARY: No rashes or lacerations.  EYES: No exophthalmos, jaundice or blindness.  ENT: No dizziness, tinnitus or hearing loss.  RESPIRATORY: No shortness of breath.  CARDIOVASCULAR: No tachycardia or chest pain.  GASTROINTESTINAL: No nausea, vomiting, pain, constipation or diarrhea.  GENITOURINARY: No frequency, dysuria or sexual dysfunction.  HEMATOLOGIC/LYMPHATIC: No excessive bleeding, prolonged or excessive bleeding after dental extraction/injury.  ALLERGIC/IMMUNOLOGIC: No allergic response to materials, foods or animals at this time.    Past Medical, Family and Social History: The patient's past medical, family and social history have been reviewed and updated as appropriate within the electronic medical record - see encounter notes.    Compliance: yes    Side effects: see above    Risk Parameters:  Patient reports no suicidal ideation  Patient reports no homicidal ideation  Patient reports no self-injurious behavior  Patient reports no violent behavior    Exam (detailed: at least 9 elements; comprehensive: all 15 elements)   Constitutional  Vitals:  Most recent vital signs, dated less than 90 days prior to this appointment, were reviewed.   Vitals:    01/23/24 1109   BP: 118/81  "  Pulse: 67   Resp: 17   SpO2: 99%   Weight: 95.6 kg (210 lb 12.8 oz)   Height: 5' 7" (1.702 m)       Body mass index is 33.02 kg/m².       General:  unremarkable, age appropriate, well nourished, casually dressed, neatly groomed, obese     Musculoskeletal  Muscle Strength/Tone:  no dyskinesia, no tremor, no tic   Gait & Station:  non-ataxic     Psychiatric  Speech:  no latency; no press, spontaneous   Mood & Affect:  steady, euthymic"good"  congruent and appropriate, full, bright   Thought Process:  normal and logical, goal-directed   Associations:  intact   Thought Content:  normal, no suicidality, no homicidality, delusions, or paranoia   Insight:  intact, has awareness of illness   Judgement: behavior is adequate to circumstances, age appropriate   Orientation:  person, place, situation, time/date, day of week, month of year, year   Memory: intact for content of interview, able to remember recent events- yes, able to remember remote events- yes   Language: grossly intact, able to name, able to repeat   Attention Span & Concentration:  able to focus, completed tasks   Fund of Knowledge:  intact and appropriate to age and level of education, familiar with aspects of current personal life     Assessment and Diagnosis   Status/Progress: Based on the examination today, the patient's problem(s) is/are improved.  New problems have not been presented today.   Co-morbidities are complicating management of the primary condition.  There are no active rule-out diagnoses for this patient at this time.     General Impression:     Unspecified Mood Disorder  Unspecified Anxiety Disorder     Morbid obesity   Nicotine Dependence in sustained full remission     Pain Disorder      Psychosocial stressors     Medical issues:   Knee s/p replacement  Back pain  Menopause       Intervention/Counseling/Treatment Plan   Medication Management: The risks and benefits of medication were discussed with the patient.  Counseling provided with " patient as follows: importance of compliance with chosen treatment options was emphasized, risks and benefits of treatment options, including medications, were discussed with the patient, risk factor reduction, prognosis, patient education, instructions for  management, treatment and follow-up were reviewed    Medication/Counseling:      Mood: pt counseled  -Continue Bupropion  mg po q day- for depression and nicotine cessation- change to/continue at  mg po TID  -Continue Lamictal at 100 mg po q AM and 200 mg po q HS  -cymblata as below     Anxiety: pt counseled  Continue Cymbalta  60 mg po q AM for depression/anxiety/chronic pain  -start vistaril 25 mg po q 6 hours prn- increased to/continue  50 mg po q 6 hours prn     Obesity: pt counseled  -wellbutrin off-label    Pain Disorder: pt counseled  -cymbalta     Nicotine use: pt counseled  -encouraged to sustain remission     Psychosocial stressors: pt counseled      Medical issues: pt counseled  -continue tx per providers     Discussed diagnosis, risks and benefits of proposed treatment vs alternative treatments vs no treatment, and potential side effects of these treatments.  The patient expresses understanding of the above and displays the capacity to agree with this treatment given said understanding.  Patient also agrees that, currently, the benefits outweigh the risks and would like to pursue treatment at this time       Psychotherapy:  Psychotherapy provided today; will provide in our sessions as needed  Pt declined referral for weekly psychotherapy        Labs:  Reviewed labs from 11/9/23 with patient         Return to Clinic: 3 months, sooner if needed      Jey Og MD  Psychiatry

## 2024-05-14 RX ORDER — HYDROXYZINE PAMOATE 50 MG/1
50 CAPSULE ORAL EVERY 6 HOURS PRN
Qty: 360 CAPSULE | Refills: 1 | Status: SHIPPED | OUTPATIENT
Start: 2024-05-14

## 2024-05-25 PROBLEM — R07.89 OTHER CHEST PAIN: Status: ACTIVE | Noted: 2024-05-25

## 2024-06-24 ENCOUNTER — PATIENT MESSAGE (OUTPATIENT)
Dept: PSYCHIATRY | Facility: CLINIC | Age: 59
End: 2024-06-24
Payer: COMMERCIAL

## 2024-06-24 RX ORDER — BUPROPION HYDROCHLORIDE 100 MG/1
100 TABLET ORAL 3 TIMES DAILY
Qty: 270 TABLET | Refills: 1 | Status: SHIPPED | OUTPATIENT
Start: 2024-06-24

## 2024-06-24 RX ORDER — LAMOTRIGINE 100 MG/1
TABLET ORAL
Qty: 270 TABLET | Refills: 1 | Status: SHIPPED | OUTPATIENT
Start: 2024-06-24

## 2024-07-15 RX ORDER — BUPROPION HYDROCHLORIDE 100 MG/1
100 TABLET ORAL 2 TIMES DAILY
Qty: 180 TABLET | Refills: 1 | Status: SHIPPED | OUTPATIENT
Start: 2024-07-15

## 2024-10-22 ENCOUNTER — PATIENT MESSAGE (OUTPATIENT)
Dept: PSYCHIATRY | Facility: CLINIC | Age: 59
End: 2024-10-22
Payer: COMMERCIAL

## 2024-10-22 RX ORDER — DULOXETIN HYDROCHLORIDE 60 MG/1
60 CAPSULE, DELAYED RELEASE ORAL DAILY
Qty: 90 CAPSULE | Refills: 1 | Status: SHIPPED | OUTPATIENT
Start: 2024-10-22

## 2024-12-29 ENCOUNTER — PATIENT MESSAGE (OUTPATIENT)
Dept: PSYCHIATRY | Facility: CLINIC | Age: 59
End: 2024-12-29
Payer: COMMERCIAL

## 2024-12-30 RX ORDER — LAMOTRIGINE 100 MG/1
TABLET ORAL
Qty: 270 TABLET | Refills: 1 | Status: SHIPPED | OUTPATIENT
Start: 2024-12-30

## 2025-01-24 RX ORDER — DULOXETIN HYDROCHLORIDE 60 MG/1
60 CAPSULE, DELAYED RELEASE ORAL
Qty: 90 CAPSULE | Refills: 1 | Status: SHIPPED | OUTPATIENT
Start: 2025-01-24

## 2025-03-11 RX ORDER — BUPROPION HYDROCHLORIDE 100 MG/1
100 TABLET ORAL 3 TIMES DAILY
Qty: 270 TABLET | Refills: 1 | Status: SHIPPED | OUTPATIENT
Start: 2025-03-11

## 2025-06-09 RX ORDER — BUPROPION HYDROCHLORIDE 100 MG/1
100 TABLET ORAL 2 TIMES DAILY
Qty: 180 TABLET | Refills: 1 | Status: SHIPPED | OUTPATIENT
Start: 2025-06-09

## (undated) DEVICE — LUBRICANT SURGILUBE 2 OZ

## (undated) DEVICE — BANDAGE ADHESIVE

## (undated) DEVICE — RELOAD ECHELON FLEX GRN 60MM

## (undated) DEVICE — ELECTRODE REM PLYHSV RETURN 9

## (undated) DEVICE — Device

## (undated) DEVICE — POWDER ARISTA AH 3G

## (undated) DEVICE — CLOSURE SKIN STERI STRIP 1/2X4

## (undated) DEVICE — BLADE SURG CARBON STEEL SZ11

## (undated) DEVICE — RELOAD ECHELON FLEX BLU 60MM

## (undated) DEVICE — STRIP STERI REIN CLSR 1/2X2IN

## (undated) DEVICE — SUT 0 VICRYL / UR6 (J603)

## (undated) DEVICE — SUT GUT PL. 4-0 27 FS-2

## (undated) DEVICE — POWDER ARISTA AH 1GM

## (undated) DEVICE — TROCAR ENDOPATH XCEL 5X100MM

## (undated) DEVICE — SEE MEDLINE ITEM 152487

## (undated) DEVICE — APPLICATOR ARISTA FLEX XL

## (undated) DEVICE — CANNULA ENDOPATH XCEL 5X100MM

## (undated) DEVICE — WARMER DRAPE STERILE LF

## (undated) DEVICE — TROCAR ENDOPATH XCEL 12MM 10CM

## (undated) DEVICE — NDL HYPO REG 25G X 1 1/2

## (undated) DEVICE — SEE MEDLINE ITEM 146313

## (undated) DEVICE — STAPLER ECHELON FLEX 60MM 44CM

## (undated) DEVICE — SHEARS HARMONIC 5CM 36CM

## (undated) DEVICE — IRRIGATOR ENDOSCOPY DISP.

## (undated) DEVICE — ADHESIVE MASTISOL VIAL 48/BX

## (undated) DEVICE — TROCAR ENDOPATH XCEL 12X100MM

## (undated) DEVICE — TUBING HF INSUFFLATION W/ FLTR

## (undated) DEVICE — SUT STRATAFIX PDO 2-0 SH

## (undated) DEVICE — ADHESIVE DERMABOND ADVANCED